# Patient Record
Sex: MALE | Race: BLACK OR AFRICAN AMERICAN
[De-identification: names, ages, dates, MRNs, and addresses within clinical notes are randomized per-mention and may not be internally consistent; named-entity substitution may affect disease eponyms.]

---

## 2017-09-27 ENCOUNTER — HOSPITAL ENCOUNTER (OUTPATIENT)
Dept: HOSPITAL 72 - PTY | Age: 67
LOS: 3 days | Discharge: HOME | End: 2017-09-30
Payer: MEDICARE

## 2017-09-27 DIAGNOSIS — M19.90: ICD-10-CM

## 2017-09-27 DIAGNOSIS — G89.29: ICD-10-CM

## 2017-09-27 DIAGNOSIS — E09.40: ICD-10-CM

## 2017-09-27 DIAGNOSIS — M51.36: Primary | ICD-10-CM

## 2017-09-27 DIAGNOSIS — R29.6: ICD-10-CM

## 2017-09-27 DIAGNOSIS — F32.9: ICD-10-CM

## 2017-09-27 PROCEDURE — 97162 PT EVAL MOD COMPLEX 30 MIN: CPT

## 2017-09-27 PROCEDURE — 97140 MANUAL THERAPY 1/> REGIONS: CPT

## 2017-09-27 PROCEDURE — 97110 THERAPEUTIC EXERCISES: CPT

## 2017-10-23 ENCOUNTER — HOSPITAL ENCOUNTER (OUTPATIENT)
Dept: HOSPITAL 72 - PTY | Age: 67
LOS: 8 days | Discharge: HOME | End: 2017-10-31
Payer: MEDICARE

## 2017-10-23 DIAGNOSIS — M51.36: Primary | ICD-10-CM

## 2017-10-23 DIAGNOSIS — I10: ICD-10-CM

## 2017-10-23 DIAGNOSIS — M19.90: ICD-10-CM

## 2017-10-23 DIAGNOSIS — F32.9: ICD-10-CM

## 2017-10-23 DIAGNOSIS — E09.40: ICD-10-CM

## 2017-10-23 PROCEDURE — 97110 THERAPEUTIC EXERCISES: CPT

## 2017-10-23 PROCEDURE — 97140 MANUAL THERAPY 1/> REGIONS: CPT

## 2018-01-13 ENCOUNTER — HOSPITAL ENCOUNTER (INPATIENT)
Dept: HOSPITAL 72 - EMR | Age: 68
LOS: 5 days | Discharge: HOME HEALTH | DRG: 720 | End: 2018-01-18
Payer: MEDICARE

## 2018-01-13 VITALS — HEIGHT: 74 IN | BODY MASS INDEX: 31.83 KG/M2 | WEIGHT: 248 LBS

## 2018-01-13 VITALS — SYSTOLIC BLOOD PRESSURE: 175 MMHG | DIASTOLIC BLOOD PRESSURE: 82 MMHG

## 2018-01-13 VITALS — DIASTOLIC BLOOD PRESSURE: 83 MMHG | SYSTOLIC BLOOD PRESSURE: 143 MMHG

## 2018-01-13 VITALS — DIASTOLIC BLOOD PRESSURE: 91 MMHG | SYSTOLIC BLOOD PRESSURE: 173 MMHG

## 2018-01-13 VITALS — SYSTOLIC BLOOD PRESSURE: 140 MMHG | DIASTOLIC BLOOD PRESSURE: 88 MMHG

## 2018-01-13 DIAGNOSIS — N39.0: ICD-10-CM

## 2018-01-13 DIAGNOSIS — Z87.891: ICD-10-CM

## 2018-01-13 DIAGNOSIS — I50.9: ICD-10-CM

## 2018-01-13 DIAGNOSIS — N17.9: ICD-10-CM

## 2018-01-13 DIAGNOSIS — I13.10: ICD-10-CM

## 2018-01-13 DIAGNOSIS — D35.2: ICD-10-CM

## 2018-01-13 DIAGNOSIS — N40.0: ICD-10-CM

## 2018-01-13 DIAGNOSIS — M51.17: ICD-10-CM

## 2018-01-13 DIAGNOSIS — D63.1: ICD-10-CM

## 2018-01-13 DIAGNOSIS — E88.09: ICD-10-CM

## 2018-01-13 DIAGNOSIS — E11.22: ICD-10-CM

## 2018-01-13 DIAGNOSIS — N18.5: ICD-10-CM

## 2018-01-13 DIAGNOSIS — A41.9: Primary | ICD-10-CM

## 2018-01-13 DIAGNOSIS — F20.0: ICD-10-CM

## 2018-01-13 LAB
ADD MANUAL DIFF: YES
ALBUMIN SERPL-MCNC: 2.8 G/DL (ref 3.4–5)
ALBUMIN/GLOB SERPL: 0.7 {RATIO} (ref 1–2.7)
ALP SERPL-CCNC: 69 U/L (ref 46–116)
ALT SERPL-CCNC: 26 U/L (ref 12–78)
ANION GAP SERPL CALC-SCNC: 16 MMOL/L (ref 5–15)
APPEARANCE UR: (no result)
APTT PPP: (no result) S
AST SERPL-CCNC: 13 U/L (ref 15–37)
BILIRUB SERPL-MCNC: 0.3 MG/DL (ref 0.2–1)
BUN SERPL-MCNC: 37 MG/DL (ref 7–18)
CALCIUM SERPL-MCNC: 8.6 MG/DL (ref 8.5–10.1)
CHLORIDE SERPL-SCNC: 104 MMOL/L (ref 98–107)
CO2 SERPL-SCNC: 16 MMOL/L (ref 21–32)
CREAT SERPL-MCNC: 5.1 MG/DL (ref 0.55–1.3)
ERYTHROCYTE [DISTWIDTH] IN BLOOD BY AUTOMATED COUNT: 14.7 % (ref 11.6–14.8)
GLOBULIN SER-MCNC: 4.3 G/DL
GLUCOSE UR STRIP-MCNC: NEGATIVE MG/DL
HCT VFR BLD CALC: 22.2 % (ref 42–52)
HGB BLD-MCNC: 7.3 G/DL (ref 14.2–18)
KETONES UR QL STRIP: NEGATIVE
LEUKOCYTE ESTERASE UR QL STRIP: (no result)
MCV RBC AUTO: 94 FL (ref 80–99)
NITRITE UR QL STRIP: NEGATIVE
PH UR STRIP: 5 [PH] (ref 4.5–8)
PLATELET # BLD: 279 K/UL (ref 150–450)
POTASSIUM SERPL-SCNC: 4.2 MMOL/L (ref 3.5–5.1)
PROT UR QL STRIP: (no result)
RBC # BLD AUTO: 2.37 M/UL (ref 4.7–6.1)
SODIUM SERPL-SCNC: 136 MMOL/L (ref 136–145)
SP GR UR STRIP: 1.01 (ref 1–1.03)
UROBILINOGEN UR-MCNC: NORMAL MG/DL (ref 0–1)
WBC # BLD AUTO: 21.9 K/UL (ref 4.8–10.8)

## 2018-01-13 PROCEDURE — 36415 COLL VENOUS BLD VENIPUNCTURE: CPT

## 2018-01-13 PROCEDURE — 82550 ASSAY OF CK (CPK): CPT

## 2018-01-13 PROCEDURE — 84550 ASSAY OF BLOOD/URIC ACID: CPT

## 2018-01-13 PROCEDURE — 87340 HEPATITIS B SURFACE AG IA: CPT

## 2018-01-13 PROCEDURE — 87086 URINE CULTURE/COLONY COUNT: CPT

## 2018-01-13 PROCEDURE — 71045 X-RAY EXAM CHEST 1 VIEW: CPT

## 2018-01-13 PROCEDURE — 85007 BL SMEAR W/DIFF WBC COUNT: CPT

## 2018-01-13 PROCEDURE — 85025 COMPLETE CBC W/AUTO DIFF WBC: CPT

## 2018-01-13 PROCEDURE — 86803 HEPATITIS C AB TEST: CPT

## 2018-01-13 PROCEDURE — 83550 IRON BINDING TEST: CPT

## 2018-01-13 PROCEDURE — 87517 HEPATITIS B DNA QUANT: CPT

## 2018-01-13 PROCEDURE — 99285 EMERGENCY DEPT VISIT HI MDM: CPT

## 2018-01-13 PROCEDURE — 83540 ASSAY OF IRON: CPT

## 2018-01-13 PROCEDURE — 83935 ASSAY OF URINE OSMOLALITY: CPT

## 2018-01-13 PROCEDURE — 86703 HIV-1/HIV-2 1 RESULT ANTBDY: CPT

## 2018-01-13 PROCEDURE — 82962 GLUCOSE BLOOD TEST: CPT

## 2018-01-13 PROCEDURE — 84443 ASSAY THYROID STIM HORMONE: CPT

## 2018-01-13 PROCEDURE — 80048 BASIC METABOLIC PNL TOTAL CA: CPT

## 2018-01-13 PROCEDURE — 76775 US EXAM ABDO BACK WALL LIM: CPT

## 2018-01-13 PROCEDURE — 84100 ASSAY OF PHOSPHORUS: CPT

## 2018-01-13 PROCEDURE — 84300 ASSAY OF URINE SODIUM: CPT

## 2018-01-13 PROCEDURE — 86901 BLOOD TYPING SEROLOGIC RH(D): CPT

## 2018-01-13 PROCEDURE — 81003 URINALYSIS AUTO W/O SCOPE: CPT

## 2018-01-13 PROCEDURE — 86850 RBC ANTIBODY SCREEN: CPT

## 2018-01-13 PROCEDURE — 87040 BLOOD CULTURE FOR BACTERIA: CPT

## 2018-01-13 PROCEDURE — 86900 BLOOD TYPING SEROLOGIC ABO: CPT

## 2018-01-13 PROCEDURE — 80053 COMPREHEN METABOLIC PANEL: CPT

## 2018-01-13 PROCEDURE — 82728 ASSAY OF FERRITIN: CPT

## 2018-01-13 PROCEDURE — 86920 COMPATIBILITY TEST SPIN: CPT

## 2018-01-13 PROCEDURE — 82570 ASSAY OF URINE CREATININE: CPT

## 2018-01-13 RX ADMIN — DEXTROSE MONOHYDRATE SCH MLS/HR: 50 INJECTION, SOLUTION INTRAVENOUS at 21:52

## 2018-01-13 NOTE — EMERGENCY ROOM REPORT
History of Present Illness


General


Chief Complaint:  Abnormal Labs


Source:  Patient





Present Illness


HPI


67-year-old male sent by primary care doctor for low hemoglobin


Patient states it was checked twice this week and was "low" both times


He denies chest pain, shortness of breath, weakness or palpitations


denies no history of iron deficiency anemia


He does endorse 2 days of dark stool but no chapito blood in stool


History of upper GI bleed or gastritis


Does not use excessive amounts of NSAIDs


Allergies:  


Coded Allergies:  


     No Known Allergies (Unverified , 9/25/17)





Patient History


Past Medical History:  DM


Past Surgical History:  none


Pertinent Family History:  none


Social History:  Denies: smoking, alcohol use, drug use


Immunizations:  UTD


Reviewed Nursing Documentation:  PMH: Agreed, PSxH: Agreed





Nursing Documentation-PMH


Past Medical History:  No History, Except For


Hx Hypertension:  Yes





Review of Systems


All Other Systems:  negative except mentioned in HPI





Physical Exam





Vital Signs








  Date Time  Temp Pulse Resp B/P (MAP) Pulse Ox O2 Delivery O2 Flow Rate FiO2


 


1/13/18 10:06 98.8 59 18 120/67 96 Room Air  








Sp02 EP Interpretation:  reviewed, normal


General Appearance:  normal inspection, well appearing, no apparent distress, 

alert, GCS 15, non-toxic


Head:  normocephalic, atraumatic


Eyes:  bilateral eye PERRL, bilateral eye EOMI


ENT:  normal ENT inspection, hearing grossly normal, normal pharynx, no 

angioedema, normal voice, TMs + canals normal, uvula midline, moist mucus 

membranes


Neck:  normal inspection, full range of motion, supple, thyroid normal, no 

meningismus, no bony tend


Respiratory:  normal inspection, lungs clear, normal breath sounds, no rhonchi, 

no respiratory distress, no retraction, no accessory muscle use, no wheezing, 

speaking full sentences


Cardiovascular #1:  regular rate, rhythm, no edema, no JVD, normal capillary 

refill


Gastrointestinal:  normal inspection, normal bowel sounds, non tender, soft, no 

mass, no peritonitis, non-distended, no guarding, no hernia, no pulsatile mass


Genitourinary:  no CVA tenderness


Musculoskeletal:  normal inspection, back normal, normal range of motion, no 

calf tenderness, pelvis stable, Lisset's Sign negative


Neurologic:  normal inspection, alert, oriented x3, responsive, CNs III-XII nml 

as tested, motor strength/tone normal, cerebellar normal, normal gait, speech 

normal


Psychiatric:  normal inspection, judgement/insight normal, mood/affect normal, 

no suicidal/homicidal ideation, no delusions


Skin:  normal inspection, no rash, pallor


Lymphatic:  normal inspection, no adenopathy





Medical Decision Making


Diagnostic Impression:  


 Primary Impression:  


 Leukocytosis


 Qualified Codes:  D72.829 - Elevated white blood cell count, unspecified


 Additional Impressions:  


 CKD (chronic kidney disease)


 Qualified Codes:  N18.9 - Chronic kidney disease, unspecified


 Anemia


 Qualified Codes:  D64.9 - Anemia, unspecified


ER Course


Patient found to have low hemoglobin - transfuse 2 units of blood in the ER


Patient also found to have elevated white count, with high neutrophil percentage

, however vital signs are stable, afebrile, no obvious source of infection on 

chest x-ray and abdomen is soft and nondistended.


Blood cultures are pending- will give him empiric Antibiotics


He states known kidney disease but not on dialysis.  Elevated creatinine here, 

no other level to compare to.





PMD is Dr. Munguia


med/surg bed


1pm


Rhythm Strip Diag. Results


EP Interpretation:  yes


Rate:  70


Rhythm:  NSR, no PVC's, no ectopy





Chest X-Ray Diagnostic Results


Chest X-Ray Diagnostic Results :  


   Chest X-Ray Ordered:  Yes


   # of Views/Limited/Complete:  1 View


   Indication:  Other - weakness


   EP Interpretation:  Yes


   Interpretation:  no consolidation, no effusion, no pneumothorax, no acute 

cardiopulmonary disease


   Impression:  No acute disease


   Electronically Signed by:  Dr Manuel Archuleta MD





Last Vital Signs








  Date Time  Temp Pulse Resp B/P (MAP) Pulse Ox O2 Delivery O2 Flow Rate FiO2


 


1/13/18 11:58  69 15 175/82 98 Room Air  


 


1/13/18 10:06 98.8       








Status:  improved


Disposition:  ADMITTED AS INPATIENT


Condition:  Serious


Scripts


Unable to Obtain Active Prescriptions or Reported Meds


Referrals:  


CHINEDU MICHELLE,REFERRING (PCP)











MANUEL ARCHULETA M.D. Jan 13, 2018 12:59

## 2018-01-13 NOTE — DIAGNOSTIC IMAGING REPORT
Indication: Weakness

 

Technique: XRAY Chest 1v

 

Comparison: None

 

Findings: Low lung volumes exaggerate heart size and vascular markings. Heart size

not reliably assessed. Possibly mildly enlarged. There is vascular crowding at the

bases. There is no definite focal consolidation. No pleural effusion or pneumothorax.

No acute osseous abnormality seen.

 

Impression: Limited exam with low lung volumes. Apparent mild cardiomegaly and

pulmonary vascular congestion, possibly artifactual. Repeat exam with improved

inspiratory effort may be obtained for better evaluation as clinically indicated. No

definite focal airspace consolidation.

## 2018-01-14 VITALS — DIASTOLIC BLOOD PRESSURE: 79 MMHG | SYSTOLIC BLOOD PRESSURE: 154 MMHG

## 2018-01-14 VITALS — SYSTOLIC BLOOD PRESSURE: 140 MMHG | DIASTOLIC BLOOD PRESSURE: 68 MMHG

## 2018-01-14 VITALS — SYSTOLIC BLOOD PRESSURE: 174 MMHG | DIASTOLIC BLOOD PRESSURE: 92 MMHG

## 2018-01-14 VITALS — SYSTOLIC BLOOD PRESSURE: 150 MMHG | DIASTOLIC BLOOD PRESSURE: 82 MMHG

## 2018-01-14 VITALS — DIASTOLIC BLOOD PRESSURE: 83 MMHG | SYSTOLIC BLOOD PRESSURE: 165 MMHG

## 2018-01-14 LAB
ADD MANUAL DIFF: YES
ADD MANUAL DIFF: YES
ALBUMIN SERPL-MCNC: 2.4 G/DL (ref 3.4–5)
ALBUMIN/GLOB SERPL: 0.6 {RATIO} (ref 1–2.7)
ALP SERPL-CCNC: 71 U/L (ref 46–116)
ALT SERPL-CCNC: 36 U/L (ref 12–78)
ANION GAP SERPL CALC-SCNC: 12 MMOL/L (ref 5–15)
AST SERPL-CCNC: 25 U/L (ref 15–37)
BILIRUB SERPL-MCNC: 0.3 MG/DL (ref 0.2–1)
BUN SERPL-MCNC: 42 MG/DL (ref 7–18)
CALCIUM SERPL-MCNC: 8.4 MG/DL (ref 8.5–10.1)
CHLORIDE SERPL-SCNC: 107 MMOL/L (ref 98–107)
CK SERPL-CCNC: 129 U/L (ref 26–308)
CO2 SERPL-SCNC: 18 MMOL/L (ref 21–32)
CREAT SERPL-MCNC: 4.6 MG/DL (ref 0.55–1.3)
ERYTHROCYTE [DISTWIDTH] IN BLOOD BY AUTOMATED COUNT: 14.5 % (ref 11.6–14.8)
ERYTHROCYTE [DISTWIDTH] IN BLOOD BY AUTOMATED COUNT: 14.7 % (ref 11.6–14.8)
GLOBULIN SER-MCNC: 4.1 G/DL
HCT VFR BLD CALC: 22.4 % (ref 42–52)
HCT VFR BLD CALC: 23 % (ref 42–52)
HGB BLD-MCNC: 7.4 G/DL (ref 14.2–18)
HGB BLD-MCNC: 7.7 G/DL (ref 14.2–18)
MCV RBC AUTO: 93 FL (ref 80–99)
MCV RBC AUTO: 93 FL (ref 80–99)
PLATELET # BLD: 244 K/UL (ref 150–450)
PLATELET # BLD: 257 K/UL (ref 150–450)
POTASSIUM SERPL-SCNC: 3.9 MMOL/L (ref 3.5–5.1)
RBC # BLD AUTO: 2.41 M/UL (ref 4.7–6.1)
RBC # BLD AUTO: 2.48 M/UL (ref 4.7–6.1)
SODIUM SERPL-SCNC: 137 MMOL/L (ref 136–145)
WBC # BLD AUTO: 16.7 K/UL (ref 4.8–10.8)
WBC # BLD AUTO: 18.4 K/UL (ref 4.8–10.8)

## 2018-01-14 PROCEDURE — 30233N1 TRANSFUSION OF NONAUTOLOGOUS RED BLOOD CELLS INTO PERIPHERAL VEIN, PERCUTANEOUS APPROACH: ICD-10-PCS

## 2018-01-14 RX ADMIN — INSULIN ASPART SCH UNITS: 100 INJECTION, SOLUTION INTRAVENOUS; SUBCUTANEOUS at 16:40

## 2018-01-14 RX ADMIN — LEVOTHYROXINE SODIUM SCH MCG: 25 TABLET ORAL at 06:18

## 2018-01-14 RX ADMIN — INSULIN ASPART SCH UNITS: 100 INJECTION, SOLUTION INTRAVENOUS; SUBCUTANEOUS at 06:30

## 2018-01-14 RX ADMIN — INSULIN ASPART SCH UNITS: 100 INJECTION, SOLUTION INTRAVENOUS; SUBCUTANEOUS at 06:19

## 2018-01-14 RX ADMIN — METOPROLOL TARTRATE SCH MG: 25 TABLET, FILM COATED ORAL at 09:04

## 2018-01-14 RX ADMIN — SODIUM CHLORIDE SCH MLS/HR: 0.9 INJECTION INTRAVENOUS at 14:58

## 2018-01-14 RX ADMIN — HYDRALAZINE HYDROCHLORIDE PRN MG: 10 TABLET ORAL at 13:00

## 2018-01-14 RX ADMIN — INSULIN ASPART SCH UNITS: 100 INJECTION, SOLUTION INTRAVENOUS; SUBCUTANEOUS at 11:51

## 2018-01-14 RX ADMIN — DEXTROSE MONOHYDRATE SCH MLS/HR: 50 INJECTION, SOLUTION INTRAVENOUS at 09:05

## 2018-01-14 RX ADMIN — INSULIN ASPART SCH UNITS: 100 INJECTION, SOLUTION INTRAVENOUS; SUBCUTANEOUS at 06:29

## 2018-01-14 RX ADMIN — SODIUM CITRATE AND CITRIC ACID MONOHYDRATE SCH ML: 500; 334 SOLUTION ORAL at 21:59

## 2018-01-14 RX ADMIN — SODIUM CHLORIDE SCH MLS/HR: 0.9 INJECTION INTRAVENOUS at 21:00

## 2018-01-14 RX ADMIN — INSULIN ASPART SCH UNITS: 100 INJECTION, SOLUTION INTRAVENOUS; SUBCUTANEOUS at 21:07

## 2018-01-14 RX ADMIN — METOPROLOL TARTRATE SCH MG: 25 TABLET, FILM COATED ORAL at 21:05

## 2018-01-14 NOTE — HISTORY AND PHYSICAL REPORT
DATE OF ADMISSION:  01/13/2018



REASON FOR ADMISSION:  Acute on chronic renal failure with severe anemia.



HISTORY OF PRESENT ILLNESS:  This is a 67-year-old  male

with a longstanding history of hypertensive heart disease and type 2

diabetes mellitus.  He has been managed medically for almost 20 years at

my office and has done well up until the last several months.



Late last year, he was noted to have abnormal laboratory studies

associated with pituitary microadenoma.  He is seeing  _____ as an

outpatient and is being considered for _____ therapy and the repeat MRI

has been pending.



Over the past few months, he has had progressive rise in his renal

function despite better blood pressure and glucose control.  He has also

been on steroids intermittently because of lumbosacral disc disease.  Last

two weeks ago, he was noted to have a very low hemoglobin level with no

signs of bleeding.  Repeat studies have confirmed that and he was referred

to the hospital for further care.



PAST MEDICAL HISTORY:  Hypertension with hypertensive heart disease, sinus

node disease with asymptomatic bradycardia on low-dose beta-blockers,

noninsulin requiring diabetes mellitus, chronic kidney disease, pituitary

microadenoma, degenerative disk disease, lumbosacral radiculopathy,

hyperuricemia, schizoaffective disorder, hypothyroidism,

hypertriglyceridemia, and BPH.



MEDICATIONS:  Prior to admission, reviewed and reconciled.



ALLERGIES:  None.



SOCIAL HISTORY:  Negative for smoking, alcohol, or substance abuse.



FAMILY HISTORY:  Notable for diabetes and hypertension in his

mother.



REVIEW OF SYSTEMS:  No fevers or chills.  No cough or sputum production.

No dysuria or frequency.  No change in bowel habits.  He recently had

steroid injection in his back.  As noted he is being evaluated for

pituitary microadenoma, which is prolactin secreting. There is no history

of seizure or stroke.  He does have a history of hypertriglyceridemia.



PHYSICAL EXAMINATION:

GENERAL:  Appears well, in no acute distress.

VITAL SIGNS:  Blood pressure on admission 173/91 now with 200/85, heart

rate 84, respiratory 20, and afebrile.

HEENT:  Conjunctivae pallor.  Sclerae anicteric.  Oropharynx clear.

NECK:  Supple.  Jugular venous pressure normal.

LUNGS:  Clear.

CARDIAC:  Regular rhythm and rate.  Normal S1 and S2 with a fourth heart

sound.

ABDOMEN:  Soft and nontender.  No CVA tenderness.

EXTREMITIES:  Good pulses.  No edema.  The patient is unable to stand

completely straight due to low back pain.



LABORATORY AND DIAGNOSTIC DATA:  White count 21.9, hemoglobin 7.3, and MCV

94.  Sodium 136, potassium 4.2, bicarbonate 16, BUN 37, and creatinine

5.1.  Albumin 2.8.  Urinalysis with too numerous to count white cells.

Urine osmolality is 312 and sodium of 35.



IMPRESSION:

1. Urinary tract infection.

2. Acute on chronic renal failure.

3. Anemia due to chronic kidney disease.

4. Hypertensive cardiomyopathy.

5. Type 2 diabetes mellitus.

6. Pituitary microadenoma.

7. Lumbosacral radiculopathy with degenerative disk disease.

8. Prostatic hypertrophy.

9. Hypoalbuminemia.



PLAN:  Antimicrobials, hydration, renal ultrasound, renal consult, and

titrate antihypertensives.  No resumption of losartan at this time.  Avoid

steroids at this time.  Consider one unit of packed red blood cell

transfusion.









  ______________________________________________

  Edmar Munguia M.D.





DR:  ALEXSANDRA

D:  01/13/2018 23:42

T:  01/14/2018 04:56

JOB#:  [id number]

CC:

## 2018-01-14 NOTE — CONSULTATION
DATE OF CONSULTATION:  01/14/2018



CONSULTING PHYSICIAN:  Maurice Wall M.D.



REFERRING PHYSICIAN:  Edmar Munguia M.D.



REASON FOR CONSULTATION:  Chronic kidney disease.



HISTORY OF PRESENT ILLNESS:  The patient is a 67-year-old man who has had

diabetes and hypertension for many years and chronic kidney disease.  He

presents with worsening anemia and renal failure.  There is no bleeding.

The patient had been using ibuprofen on and off intermittently for chronic

back pain.  This was discontinued a few months ago.  He is on no

nonsteroidal anti-inflammatory agents this time or received no contrast.

The patient has had some generalized weakness.  He said he had a flu-like

illness around Christmas of 2017.  The patient states sugars have been

ranging in the 130s and blood pressure from 135-160 systolic.



PAST MEDICAL HISTORY:  Includes pituitary microadenoma and lumbar disc

disease.  He has been on steroids intermittently for the lumbar disk

disease.  He has also had sinus node disease with asymptomatic per

bradycardia, on low-dose beta-blockers.  There is a history of

schizoaffective disorder, hypertriglyceridemia, and BPH.



PAST SURGICAL HISTORY:  Cataract interocular lens and ear surgery for

hearing impairment.



MEDICATIONS:  At home include amlodipine, _____01:20, glimepiride,

levothyroxine, losartan, metoprolol, and Zantac.



ALLERGIES:  None known.



HABITS:  He was a smoker in the distant past and quit.  Alcohol rare.

Drugs none.



SOCIAL HISTORY:  The patient is retired and single.  He has been a

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

other jobs.



REVIEW OF SYSTEMS:  HEAD, EYES, EARS, NOSE, AND THROAT:  History of

cataracts.  No known diabetic retinopathy.  History of ear surgery for

hearing impairment.  PULMONARY:  No chronic cough, asthma, or TB.

CARDIAC:  History of bradycardia as above.  No CHF or angina.  ENDOCRINE:

History of apparently prolactin secreting pituitary adenoma,

hypothyroidism, and diabetes.  GASTROINTESTINAL:  No recent nausea,

vomiting, hematochezia, or melena.  GENITOURINARY:  He states he voids

well.  Nocturia about two times per night.  No decrease in stream of

urine.  NEUROLOGIC:  No seizures or stroke.



PHYSICAL EXAMINATION:

GENERAL:  The patient is an alert man, lying in bed, in no acute

distress.

VITAL SIGNS:  Temperature 98.8 degrees, pulse 71, respirations 18, and

blood pressure 165/83, O2 saturation 97% on room air.

HEAD, EYES, EARS, NOSE, AND THROAT:  Sclerae are nonicteric.  Ocular

motions intact in all directions.  Oral mucosa moist.

NECK:  No adenopathy or thyroid enlargement.

LUNGS:  Clear.

HEART:  Regular rhythm with ectopic beats.  An apical S4.  I do not hear

any murmurs.

ABDOMEN:  Soft without organomegaly or masses.

EXTREMITIES:  No edema.

NEUROLOGIC:  He is alert and responsive.  He is oriented.  There is a mild

dysarthria.  Ocular motions intact in all directions.  Smile is symmetric.

Tongue is midline.  He moves all extremities equally.



LABORATORY DATA:  Labs are as follows, white count 21.9, repeat 18.4;

hemoglobin 7.3, repeat 7.7 posttransfusion.  Sodium 136, potassium 4.2,

chloride 104, CO2 16, BUN 37 and creatinine 5.1, repeat 42 and 4.6.  Uric

acid 5.4.  Albumin 2.4.  TSH 3.085.  Urine shows too numerous to count

white blood cells.  Urine sodium 35, urine creatinine is 98.5.



IMPRESSION:

1. Chronic kidney disease, stage 5.

2. Urinary tract infection.

3. Mild dehydration.

4. History of diabetes.

5. History of hypertension.

6. Anemia of chronic kidney disease.

7. Lumbar disk disease.



PLAN:  The patient will be hydrated cautiously.  I discussed with the

patient that he will likely need dialysis in the near future and we should

protect his left arm for dialysis fistula, which will be created in the

near future.  I will talk further with Dr. Munguia and review his prior

laboratories and course and we will make plans as above.  He also needs to

be on Epogen or equivalent for anemia of chronic kidney disease.  Orders

have been given.









  ______________________________________________

  Maurice Wall M.D. DR:  Nena

D:  01/14/2018 13:06

T:  01/14/2018 19:01

JOB#:  [id number]

CC:

## 2018-01-15 VITALS — SYSTOLIC BLOOD PRESSURE: 163 MMHG | DIASTOLIC BLOOD PRESSURE: 91 MMHG

## 2018-01-15 VITALS — SYSTOLIC BLOOD PRESSURE: 169 MMHG | DIASTOLIC BLOOD PRESSURE: 97 MMHG

## 2018-01-15 VITALS — SYSTOLIC BLOOD PRESSURE: 165 MMHG | DIASTOLIC BLOOD PRESSURE: 92 MMHG

## 2018-01-15 VITALS — DIASTOLIC BLOOD PRESSURE: 97 MMHG | SYSTOLIC BLOOD PRESSURE: 169 MMHG

## 2018-01-15 VITALS — SYSTOLIC BLOOD PRESSURE: 144 MMHG | DIASTOLIC BLOOD PRESSURE: 70 MMHG

## 2018-01-15 VITALS — DIASTOLIC BLOOD PRESSURE: 65 MMHG | SYSTOLIC BLOOD PRESSURE: 146 MMHG

## 2018-01-15 VITALS — SYSTOLIC BLOOD PRESSURE: 175 MMHG | DIASTOLIC BLOOD PRESSURE: 86 MMHG

## 2018-01-15 VITALS — DIASTOLIC BLOOD PRESSURE: 94 MMHG | SYSTOLIC BLOOD PRESSURE: 185 MMHG

## 2018-01-15 LAB
% IRON SATURATION: 22 % (ref 15–50)
ADD MANUAL DIFF: NO
ANION GAP SERPL CALC-SCNC: 14 MMOL/L (ref 5–15)
BASOPHILS NFR BLD AUTO: 0.4 % (ref 0–2)
BUN SERPL-MCNC: 43 MG/DL (ref 7–18)
CALCIUM SERPL-MCNC: 8.4 MG/DL (ref 8.5–10.1)
CHLORIDE SERPL-SCNC: 107 MMOL/L (ref 98–107)
CK SERPL-CCNC: 134 U/L (ref 26–308)
CO2 SERPL-SCNC: 19 MMOL/L (ref 21–32)
CREAT SERPL-MCNC: 4.6 MG/DL (ref 0.55–1.3)
EOSINOPHIL NFR BLD AUTO: 0.3 % (ref 0–3)
ERYTHROCYTE [DISTWIDTH] IN BLOOD BY AUTOMATED COUNT: 14.4 % (ref 11.6–14.8)
FERRITIN SERPL-MCNC: 416 NG/ML (ref 8–388)
HCT VFR BLD CALC: 25.3 % (ref 42–52)
HGB BLD-MCNC: 8.6 G/DL (ref 14.2–18)
IRON SERPL-MCNC: 38 UG/DL (ref 50–175)
LYMPHOCYTES NFR BLD AUTO: 10.8 % (ref 20–45)
MCV RBC AUTO: 91 FL (ref 80–99)
MONOCYTES NFR BLD AUTO: 9.4 % (ref 1–10)
NEUTROPHILS NFR BLD AUTO: 79.2 % (ref 45–75)
PHOSPHATE SERPL-MCNC: 3.6 MG/DL (ref 2.5–4.9)
PLATELET # BLD: 299 K/UL (ref 150–450)
POTASSIUM SERPL-SCNC: 3.8 MMOL/L (ref 3.5–5.1)
RBC # BLD AUTO: 2.76 M/UL (ref 4.7–6.1)
SODIUM SERPL-SCNC: 140 MMOL/L (ref 136–145)
TIBC SERPL-MCNC: 173 UG/DL (ref 250–450)
UNSATURATED IRON BINDING: 135 UG/DL (ref 112–346)
WBC # BLD AUTO: 12.6 K/UL (ref 4.8–10.8)

## 2018-01-15 RX ADMIN — SODIUM CHLORIDE SCH MLS/HR: 0.9 INJECTION INTRAVENOUS at 09:12

## 2018-01-15 RX ADMIN — SODIUM CHLORIDE SCH MLS/HR: 0.9 INJECTION INTRAVENOUS at 16:52

## 2018-01-15 RX ADMIN — HYDRALAZINE HYDROCHLORIDE PRN MG: 10 TABLET ORAL at 00:51

## 2018-01-15 RX ADMIN — INSULIN ASPART SCH UNITS: 100 INJECTION, SOLUTION INTRAVENOUS; SUBCUTANEOUS at 12:19

## 2018-01-15 RX ADMIN — SODIUM CITRATE AND CITRIC ACID MONOHYDRATE SCH ML: 500; 334 SOLUTION ORAL at 04:05

## 2018-01-15 RX ADMIN — INSULIN ASPART SCH UNITS: 100 INJECTION, SOLUTION INTRAVENOUS; SUBCUTANEOUS at 22:32

## 2018-01-15 RX ADMIN — INSULIN ASPART SCH UNITS: 100 INJECTION, SOLUTION INTRAVENOUS; SUBCUTANEOUS at 17:01

## 2018-01-15 RX ADMIN — ERYTHROPOIETIN SCH UNITS: 20000 INJECTION, SOLUTION INTRAVENOUS; SUBCUTANEOUS at 22:27

## 2018-01-15 RX ADMIN — HYDRALAZINE HYDROCHLORIDE PRN MG: 10 TABLET ORAL at 11:28

## 2018-01-15 RX ADMIN — LEVOTHYROXINE SODIUM SCH MCG: 25 TABLET ORAL at 06:40

## 2018-01-15 RX ADMIN — METOPROLOL TARTRATE SCH MG: 25 TABLET, FILM COATED ORAL at 09:10

## 2018-01-15 RX ADMIN — METOPROLOL TARTRATE SCH MG: 25 TABLET, FILM COATED ORAL at 22:26

## 2018-01-15 RX ADMIN — SODIUM CHLORIDE SCH MLS/HR: 0.9 INJECTION INTRAVENOUS at 01:48

## 2018-01-15 RX ADMIN — INSULIN ASPART SCH UNITS: 100 INJECTION, SOLUTION INTRAVENOUS; SUBCUTANEOUS at 06:43

## 2018-01-15 RX ADMIN — BENZTROPINE MESYLATE SCH MG: 1 TABLET ORAL at 15:57

## 2018-01-15 RX ADMIN — BENZTROPINE MESYLATE SCH MG: 1 TABLET ORAL at 22:26

## 2018-01-15 RX ADMIN — SODIUM CHLORIDE SCH MLS/HR: 0.9 INJECTION INTRAVENOUS at 23:45

## 2018-01-15 RX ADMIN — SODIUM CHLORIDE SCH MLS/HR: 0.9 INJECTION INTRAVENOUS at 22:26

## 2018-01-15 RX ADMIN — HYDRALAZINE HYDROCHLORIDE PRN MG: 10 TABLET ORAL at 18:03

## 2018-01-15 NOTE — PROGRESS NOTE
DATE:  01/14/2018



CARDIOLOGY PROGRESS NOTE



SUBJECTIVE:  The patient is status post 1 unit of packed red blood cells.

Hemoglobin 7.7.  No chest pain or shortness of breath.  Renal consult

appreciated.



OBJECTIVE:

VITAL SIGNS:  Blood pressure 140/68, pulse 72, respirations 18, afebrile,

earlier 174/92.

NECK:  Supple.

LUNGS:  Clear.

CARDIAC:  Regular, normal S1, S2.

ABDOMEN:  Soft, 

EXTR: no edema.



PVR is 165 cc.



IMPRESSION:

1. Acute on chronic renal failure.

2. Diabetic nephropathy.

3. Hypertensive nephrosclerosis.

4. Hypertensive heart disease.

5. History of malignant hypertension.

6. Anemia due to chronic kidney disease.

7. Urinary tract infection with sepsis.

8. Leukocytosis.

9. BPH



PLAN:

1. Transfuse another unit.

2. Titrate antihypertensives; add doxazosin.

3. Continue antimicrobials.

4. Follow up white blood count.

5. Check renal ultrasound.









  ______________________________________________

  Edmar Munguia M.D.





DR:  JUAN

D:  01/15/2018 03:31

T:  01/15/2018 13:03

JOB#:  [id number]

CC:



CHHAYA

## 2018-01-15 NOTE — NEPHROLOGY PROGRESS NOTE
Assessment/Plan


Problem List:  


(1) Malignant hypertension (arteriolar nephrosclerosis)


(2) Diabetes


(3) UTI (urinary tract infection)


(4) CKD (chronic kidney disease) stage 5, GFR less than 15 ml/min


(5) Anemia in chronic kidney disease


(6) Leukocytosis


Plan


continue epogen, venofer, bp meds  needs av fistula soon





Subjective


Constitutional:  Reports: weakness


HEENT:  Reports: no symptoms


Genitourinary:  Reports: no symptoms


Neurologic/Psychiatric:  Reports: no symptoms





Objective


Objective





Last 24 Hour Vital Signs








  Date Time  Temp Pulse Resp B/P (MAP) Pulse Ox O2 Delivery O2 Flow Rate FiO2


 


1/15/18 13:04  58      





  71      





  78      


 


1/15/18 12:00  62 20 146/65 97 Room Air  


 


1/15/18 11:28    176/91    


 


1/15/18 09:12  67  175/86    


 


1/15/18 09:10  67  175/86    


 


1/15/18 08:40  67 16 175/86 98 Room Air  


 


1/15/18 04:00 99.3 76 18 163/91 97 Room Air  


 


1/15/18 00:51    165/92    


 


1/15/18 00:35 98.2 64 18 165/92 98 Room Air  


 


1/14/18 21:33  72      





  90      





  94      


 


1/14/18 21:05  72  140/68    


 


1/14/18 20:00 97.9 72 18 140/68 96 Room Air  


 


1/14/18 16:49  65  174/92    


 


1/14/18 16:03 98.2 65 20 174/92 96 Room Air  

















Intake and Output  


 


 1/14/18 1/15/18





 19:00 07:00


 


Intake Total 845 ml 180 ml


 


Balance 845 ml 180 ml


 


  


 


Intake Oral 720 ml 


 


IV Total 125 ml 180 ml


 


# Voids 2 


 


# Bowel Movements 1 








Laboratory Tests


1/14/18 16:15: 


White Blood Count 16.7H, Red Blood Count 2.48L, Hemoglobin 7.4L, Hematocrit 

23.0L, Mean Corpuscular Volume 93, Mean Corpuscular Hemoglobin 29.8, Mean 

Corpuscular Hemoglobin Concent 32.1, Red Cell Distribution Width 14.7, Platelet 

Count 257, Mean Platelet Volume 7.1, Neutrophils (%) (Auto) , Lymphocytes (%) (

Auto) , Monocytes (%) (Auto) , Eosinophils (%) (Auto) , Basophils (%) (Auto) , 

Differential Total Cells Counted 100, Neutrophils % (Manual) 85H, Lymphocytes % 

(Manual) 9L, Monocytes % (Manual) 6, Eosinophils % (Manual) 0, Basophils % (

Manual) 0, Band Neutrophils 0, Platelet Estimate Adequate, Platelet Morphology 

Normal, Hypochromasia 1+, Anisocytosis 1+


1/15/18 05:50: 


White Blood Count 12.6H, Red Blood Count 2.76L, Hemoglobin 8.6L, Hematocrit 

25.3L, Mean Corpuscular Volume 91, Mean Corpuscular Hemoglobin 31.0, Mean 

Corpuscular Hemoglobin Concent 33.9, Red Cell Distribution Width 14.4, Platelet 

Count 299, Mean Platelet Volume 7.0, Neutrophils (%) (Auto) 79.2H, Lymphocytes (

%) (Auto) 10.8L, Monocytes (%) (Auto) 9.4, Eosinophils (%) (Auto) 0.3, 

Basophils (%) (Auto) 0.4, Sodium Level 140, Potassium Level 3.8, Chloride Level 

107, Carbon Dioxide Level 19L, Anion Gap 14, Blood Urea Nitrogen 43H, 

Creatinine 4.6H, Estimat Glomerular Filtration Rate 15.4, Glucose Level 140H, 

Calcium Level 8.4L, Phosphorus Level 3.6, Iron Level 38L, Total Iron Binding 

Capacity 173L, Percent Iron Saturation 22, Unsaturated Iron Binding 135, 

Ferritin 416H, Total Creatine Kinase 134, Hepatitis B Surface Antigen [Pending]

, Hepatitis B Surface Antibody [Pending], Hepatitis C Antibody [Pending], HIV (1

&2) Antibody Rapid Negative


Height (Feet):  6


Height (Inches):  2.00


Weight (Pounds):  248


General Appearance:  no apparent distress, alert


EENT:  normal ENT inspection


Neck:  normal alignment


Cardiovascular:  normal rate, regular rhythm


Respiratory/Chest:  lungs clear


Abdomen:  non tender


Extremities:  other - no edema


Neurologic:  CNs II-XII grossly normal











MACKENZIE MAY Shakeel 15, 2018 15:31

## 2018-01-15 NOTE — DIAGNOSTIC IMAGING REPORT
Indication: Abnormal renal function

 

Technique: Multiplanar grayscale and color Doppler imaging of the kidneys and

bladder.

 

Comparison: None

 

Findings: The right kidney measures 12.5 cm in length. The left kidney measures 11.5

cm in length. There is subtle increased echogenicity bilaterally. These findings may

suggest medical renal disease. There is no hydronephrosis bilaterally. There is

question of trace fluid around the right kidney. A 4 cm simple appearing renal cyst

is noted in the left kidney. The bladder is unremarkable in appearance.

 

Impression: 

Subtle increased renal parenchymal echogenicity suggestive of medical renal disease.

 

No hydronephrosis or sonographically appreciable renal stones.

 

4 centimeter simple appearing cyst of the left kidney.

## 2018-01-16 VITALS — DIASTOLIC BLOOD PRESSURE: 91 MMHG | SYSTOLIC BLOOD PRESSURE: 180 MMHG

## 2018-01-16 VITALS — DIASTOLIC BLOOD PRESSURE: 93 MMHG | SYSTOLIC BLOOD PRESSURE: 186 MMHG

## 2018-01-16 VITALS — SYSTOLIC BLOOD PRESSURE: 171 MMHG | DIASTOLIC BLOOD PRESSURE: 98 MMHG

## 2018-01-16 VITALS — SYSTOLIC BLOOD PRESSURE: 191 MMHG | DIASTOLIC BLOOD PRESSURE: 86 MMHG

## 2018-01-16 VITALS — DIASTOLIC BLOOD PRESSURE: 87 MMHG | SYSTOLIC BLOOD PRESSURE: 176 MMHG

## 2018-01-16 VITALS — SYSTOLIC BLOOD PRESSURE: 173 MMHG | DIASTOLIC BLOOD PRESSURE: 97 MMHG

## 2018-01-16 LAB
APPEARANCE UR: (no result)
APTT PPP: (no result) S
GLUCOSE UR STRIP-MCNC: (no result) MG/DL
KETONES UR QL STRIP: NEGATIVE
LEUKOCYTE ESTERASE UR QL STRIP: (no result)
NITRITE UR QL STRIP: NEGATIVE
PH UR STRIP: 6 [PH] (ref 4.5–8)
PROT UR QL STRIP: (no result)
SP GR UR STRIP: 1.01 (ref 1–1.03)
UROBILINOGEN UR-MCNC: NORMAL MG/DL (ref 0–1)

## 2018-01-16 RX ADMIN — INSULIN ASPART SCH UNITS: 100 INJECTION, SOLUTION INTRAVENOUS; SUBCUTANEOUS at 12:14

## 2018-01-16 RX ADMIN — BENZTROPINE MESYLATE SCH MG: 1 TABLET ORAL at 20:39

## 2018-01-16 RX ADMIN — INSULIN ASPART SCH UNITS: 100 INJECTION, SOLUTION INTRAVENOUS; SUBCUTANEOUS at 20:40

## 2018-01-16 RX ADMIN — INSULIN ASPART SCH UNITS: 100 INJECTION, SOLUTION INTRAVENOUS; SUBCUTANEOUS at 17:08

## 2018-01-16 RX ADMIN — INSULIN ASPART SCH UNITS: 100 INJECTION, SOLUTION INTRAVENOUS; SUBCUTANEOUS at 06:39

## 2018-01-16 RX ADMIN — BENZTROPINE MESYLATE SCH MG: 1 TABLET ORAL at 08:58

## 2018-01-16 RX ADMIN — METOPROLOL TARTRATE SCH MG: 25 TABLET, FILM COATED ORAL at 20:38

## 2018-01-16 RX ADMIN — HYDRALAZINE HYDROCHLORIDE PRN MG: 10 TABLET ORAL at 04:53

## 2018-01-16 RX ADMIN — SODIUM CHLORIDE SCH MLS/HR: 0.9 INJECTION INTRAVENOUS at 08:58

## 2018-01-16 RX ADMIN — LEVOTHYROXINE SODIUM SCH MCG: 25 TABLET ORAL at 06:37

## 2018-01-16 RX ADMIN — DOXAZOSIN MESYLATE SCH MG: 4 TABLET ORAL at 16:58

## 2018-01-16 RX ADMIN — METOPROLOL TARTRATE SCH MG: 25 TABLET, FILM COATED ORAL at 08:59

## 2018-01-16 RX ADMIN — DOXAZOSIN MESYLATE SCH MG: 4 TABLET ORAL at 15:29

## 2018-01-16 RX ADMIN — SODIUM CHLORIDE SCH MLS/HR: 0.9 INJECTION INTRAVENOUS at 20:38

## 2018-01-16 RX ADMIN — HYDRALAZINE HYDROCHLORIDE PRN MG: 10 TABLET ORAL at 12:14

## 2018-01-16 NOTE — PROGRESS NOTE
DATE:  01/15/2018



CARDIOLOGY PROGRESS NOTE



SUBJECTIVE:  The patient has some anxiety.  He notes not having taken his

psych medications since admission.



OBJECTIVE:

VITAL SIGNS:  Afebrile.  Blood pressure 144/70, earlier 169/97, heart rate

71, and respiratory rate 18.

HEENT:  Conjunctiva pallor.  Oropharynx clear.

NECK:  Supple.

LUNGS:  Clear.

CARDIAC:  Regular rhythm and rate.  Normal S1, S2 with a fourth heart

sound.

ABDOMEN:  Soft.

EXTREMITIES:  No edema.

NEUROLOGIC:  Slight tremor.



LABORATORY DATA:  White count 12.6 and hemoglobin 8.6.  Potassium 3.8, BUN

43, and creatinine 4.6.



IMPRESSION:

1. Urinary tract infection, sepsis, and prostatic hypertrophy.

2. Hypertensive heart disease.

3. Malignant range of blood pressure.

4. Acute on chronic renal failure.

5. Anemia due to chronic kidney disease.

6. Schizoaffective disorder.

7. Pituitary adenoma secreting prolactin.



PLAN:

1. Restart Prolixin and Cogentin.

2. Continue antimicrobials.

3. Monitor hemoglobin.

4. Epogen and iron replacement.

5. Outpatient plans for a fistula to follow.

6. Titrate diabetic regimen.

7. Outpatient endocrine follow up.









  ______________________________________________

  Edmar Munguia M.D.





DR:  KALLIE

D:  01/16/2018 00:38

T:  01/16/2018 03:17

JOB#:  [id number]

CC:

## 2018-01-16 NOTE — NEPHROLOGY PROGRESS NOTE
Assessment/Plan


Problem List:  


(1) Malignant hypertension (arteriolar nephrosclerosis)


(2) Diabetes


(3) UTI (urinary tract infection)


(4) CKD (chronic kidney disease) stage 5, GFR less than 15 ml/min


(5) Anemia in chronic kidney disease


(6) Leukocytosis


Plan


continue epogen, venofer, bp meds titrate  needs av fistula soon





Subjective


Constitutional:  Reports: weakness


HEENT:  Reports: no symptoms


Genitourinary:  Reports: no symptoms


Neurologic/Psychiatric:  Reports: no symptoms





Objective


Objective





Last 24 Hour Vital Signs








  Date Time  Temp Pulse Resp B/P (MAP) Pulse Ox O2 Delivery O2 Flow Rate FiO2


 


1/16/18 12:14    191/86    


 


1/16/18 11:23 97.8 61 20 191/86 98   


 


1/16/18 09:00  64      





  63      





  62      


 


1/16/18 08:59  64  176/87    


 


1/16/18 08:59  64  176/87    


 


1/16/18 08:15 98.4  18 176/87 98 Room Air  


 


1/16/18 06:43  62  171/98    


 


1/16/18 04:53    186/93    


 


1/16/18 04:46 98.1 60 20 186/93 98 Room Air  


 


1/16/18 00:00      Room Air  


 


1/15/18 23:42 98.1 73 20 185/94 96 Room Air  


 


1/15/18 22:26  75  144/70    


 


1/15/18 20:00 97.9 75 18 144/70 96 Room Air  


 


1/15/18 19:48  61      





  65      





  69      


 


1/15/18 18:03    169/97    


 


1/15/18 18:03  71  169/97    


 


1/15/18 16:30   18 169/97 96 Room Air  

















Intake and Output  


 


 1/15/18 1/16/18





 19:00 07:00


 


Intake Total 720 ml 115 ml


 


Balance 720 ml 115 ml


 


  


 


Intake Oral 720 ml 


 


IV Total  115 ml


 


# Voids 4 3


 


# Bowel Movements 2 1








Laboratory Tests


1/16/18 08:25: 


Urine Color Pale yellow, Urine Appearance Slightly cloudy, Urine pH 6, Urine 

Specific Gravity 1.015, Urine Protein 4+H, Urine Glucose (UA) 1+H, Urine 

Ketones Negative, Urine Occult Blood 2+H, Urine Nitrite Negative, Urine 

Bilirubin Negative, Urine Urobilinogen Normal, Urine Leukocyte Esterase 1+H, 

Urine RBC 5-10H, Urine WBC 10-15H, Urine Squamous Epithelial Cells Few, Urine 

Bacteria Few, Urine Hyaline Casts 0-2H


Height (Feet):  6


Height (Inches):  2.00


Weight (Pounds):  248


General Appearance:  no apparent distress, alert, obese


EENT:  normal ENT inspection


Neck:  normal alignment


Cardiovascular:  normal rate


Respiratory/Chest:  lungs clear


Abdomen:  non tender


Extremities:  other - no edema


Neurologic:  CNs II-XII grossly normal











MACKENZIE MAY Jan 16, 2018 13:23

## 2018-01-17 VITALS — DIASTOLIC BLOOD PRESSURE: 76 MMHG | SYSTOLIC BLOOD PRESSURE: 179 MMHG

## 2018-01-17 VITALS — SYSTOLIC BLOOD PRESSURE: 192 MMHG | DIASTOLIC BLOOD PRESSURE: 85 MMHG

## 2018-01-17 VITALS — SYSTOLIC BLOOD PRESSURE: 147 MMHG | DIASTOLIC BLOOD PRESSURE: 71 MMHG

## 2018-01-17 VITALS — SYSTOLIC BLOOD PRESSURE: 155 MMHG | DIASTOLIC BLOOD PRESSURE: 86 MMHG

## 2018-01-17 VITALS — SYSTOLIC BLOOD PRESSURE: 152 MMHG | DIASTOLIC BLOOD PRESSURE: 92 MMHG

## 2018-01-17 VITALS — SYSTOLIC BLOOD PRESSURE: 182 MMHG | DIASTOLIC BLOOD PRESSURE: 97 MMHG

## 2018-01-17 VITALS — DIASTOLIC BLOOD PRESSURE: 89 MMHG | SYSTOLIC BLOOD PRESSURE: 158 MMHG

## 2018-01-17 LAB
ADD MANUAL DIFF: NO
ANION GAP SERPL CALC-SCNC: 14 MMOL/L (ref 5–15)
BASOPHILS NFR BLD AUTO: 1.2 % (ref 0–2)
BUN SERPL-MCNC: 36 MG/DL (ref 7–18)
CALCIUM SERPL-MCNC: 8.9 MG/DL (ref 8.5–10.1)
CHLORIDE SERPL-SCNC: 107 MMOL/L (ref 98–107)
CO2 SERPL-SCNC: 18 MMOL/L (ref 21–32)
CREAT SERPL-MCNC: 4.1 MG/DL (ref 0.55–1.3)
EOSINOPHIL NFR BLD AUTO: 2.8 % (ref 0–3)
ERYTHROCYTE [DISTWIDTH] IN BLOOD BY AUTOMATED COUNT: 14.3 % (ref 11.6–14.8)
HCT VFR BLD CALC: 27.9 % (ref 42–52)
HGB BLD-MCNC: 9 G/DL (ref 14.2–18)
LYMPHOCYTES NFR BLD AUTO: 18 % (ref 20–45)
MCV RBC AUTO: 92 FL (ref 80–99)
MONOCYTES NFR BLD AUTO: 9.3 % (ref 1–10)
NEUTROPHILS NFR BLD AUTO: 68.8 % (ref 45–75)
PLATELET # BLD: 332 K/UL (ref 150–450)
POTASSIUM SERPL-SCNC: 4 MMOL/L (ref 3.5–5.1)
RBC # BLD AUTO: 3.04 M/UL (ref 4.7–6.1)
SODIUM SERPL-SCNC: 139 MMOL/L (ref 136–145)
WBC # BLD AUTO: 9.8 K/UL (ref 4.8–10.8)

## 2018-01-17 RX ADMIN — INSULIN ASPART SCH UNITS: 100 INJECTION, SOLUTION INTRAVENOUS; SUBCUTANEOUS at 17:46

## 2018-01-17 RX ADMIN — INSULIN ASPART SCH UNITS: 100 INJECTION, SOLUTION INTRAVENOUS; SUBCUTANEOUS at 06:12

## 2018-01-17 RX ADMIN — ERYTHROPOIETIN SCH UNITS: 20000 INJECTION, SOLUTION INTRAVENOUS; SUBCUTANEOUS at 22:03

## 2018-01-17 RX ADMIN — DOXAZOSIN MESYLATE SCH MG: 4 TABLET ORAL at 09:06

## 2018-01-17 RX ADMIN — DOXAZOSIN MESYLATE SCH MG: 4 TABLET ORAL at 17:39

## 2018-01-17 RX ADMIN — BENZTROPINE MESYLATE SCH MG: 1 TABLET ORAL at 21:50

## 2018-01-17 RX ADMIN — BENZTROPINE MESYLATE SCH MG: 1 TABLET ORAL at 09:07

## 2018-01-17 RX ADMIN — METOPROLOL TARTRATE SCH MG: 25 TABLET, FILM COATED ORAL at 09:07

## 2018-01-17 RX ADMIN — HYDRALAZINE HYDROCHLORIDE PRN MG: 10 TABLET ORAL at 12:28

## 2018-01-17 RX ADMIN — INSULIN ASPART SCH UNITS: 100 INJECTION, SOLUTION INTRAVENOUS; SUBCUTANEOUS at 22:13

## 2018-01-17 RX ADMIN — METOPROLOL TARTRATE SCH MG: 25 TABLET, FILM COATED ORAL at 21:51

## 2018-01-17 RX ADMIN — INSULIN ASPART SCH UNITS: 100 INJECTION, SOLUTION INTRAVENOUS; SUBCUTANEOUS at 12:30

## 2018-01-17 RX ADMIN — SODIUM CHLORIDE SCH MLS/HR: 0.9 INJECTION INTRAVENOUS at 22:02

## 2018-01-17 RX ADMIN — LEVOTHYROXINE SODIUM SCH MCG: 25 TABLET ORAL at 06:13

## 2018-01-17 NOTE — NEPHROLOGY PROGRESS NOTE
Assessment/Plan


Problem List:  


(1) Malignant hypertension (arteriolar nephrosclerosis)


(2) Diabetes


(3) UTI (urinary tract infection)


(4) CKD (chronic kidney disease) stage 5, GFR less than 15 ml/min


(5) Anemia in chronic kidney disease


(6) Leukocytosis


Plan


continue epogen, venofer, bp meds titrate  needs av fistula soon, will change 

from amlodipine to nifedipine 60 bid





Subjective


Constitutional:  Reports: weakness


HEENT:  Reports: no symptoms


Genitourinary:  Reports: no symptoms


Neurologic/Psychiatric:  Reports: no symptoms





Objective


Objective





Last 24 Hour Vital Signs








  Date Time  Temp Pulse Resp B/P (MAP) Pulse Ox O2 Delivery O2 Flow Rate FiO2


 


1/17/18 15:50    182/97    


 


1/17/18 15:30 97.7 58 18 179/76 98   


 


1/17/18 13:13  64 20 158/89 97   


 


1/17/18 13:00  58      





  83      





  82      


 


1/17/18 12:28    192/91    


 


1/17/18 11:45 97.9 55 18 192/85 97   


 


1/17/18 09:08  73  155/86    


 


1/17/18 09:07  73  155/86    


 


1/17/18 04:03 98.2 73 19 155/86 96   


 


1/17/18 00:04 98.2 59 19 147/71 96   


 


1/16/18 20:38  63  173/97    


 


1/16/18 20:07 98.2 63 19 173/97 97   


 


1/16/18 18:00  62      





  79      





  84      


 


1/16/18 17:09  58  180/91    

















Intake and Output  


 


 1/16/18 1/17/18





 19:00 07:00


 


Intake Total 480 ml 


 


Balance 480 ml 


 


  


 


Intake Oral 480 ml 


 


# Voids 2 2








Laboratory Tests


1/17/18 06:35: 


White Blood Count 9.8, Red Blood Count 3.04L, Hemoglobin 9.0L, Hematocrit 27.9L

, Mean Corpuscular Volume 92, Mean Corpuscular Hemoglobin 29.8, Mean 

Corpuscular Hemoglobin Concent 32.4, Red Cell Distribution Width 14.3, Platelet 

Count 332, Mean Platelet Volume 6.2L, Neutrophils (%) (Auto) 68.8, Lymphocytes (

%) (Auto) 18.0L, Monocytes (%) (Auto) 9.3, Eosinophils (%) (Auto) 2.8, 

Basophils (%) (Auto) 1.2, Sodium Level 139, Potassium Level 4.0, Chloride Level 

107, Carbon Dioxide Level 18L, Anion Gap 14, Blood Urea Nitrogen 36H, 

Creatinine 4.1H, Estimat Glomerular Filtration Rate 17.6, Glucose Level 136H, 

Calcium Level 8.9


Height (Feet):  6


Height (Inches):  2.00


Weight (Pounds):  248


General Appearance:  no apparent distress, alert


EENT:  normal ENT inspection


Neck:  normal alignment


Cardiovascular:  normal rate


Respiratory/Chest:  lungs clear


Abdomen:  non tender


Extremities:  other - no edema


Neurologic:  CNs II-XII grossly normal











MACKENZIE MAY Jan 17, 2018 16:59

## 2018-01-17 NOTE — PROGRESS NOTE
DATE:  01/16/2018



CARDIOLOGY PROGRESS NOTE



SUBJECTIVE:  The patient feels better.  Blood pressure today is

significantly elevated.



OBJECTIVE:

VITAL SIGNS:  Blood pressure 144/70 to 191/86.  The patient is

nonorthostatic, heart rate 61, respiratory rate 20.  Afebrile.

NECK:  Supple.

LUNGS:  Clear.

CARDIAC:  Regular.  Normal S1, S2 with a fourth heart sound.

ABDOMEN:  Soft.

EXTREMITIES:  Mild scrotal edema.  Trace lower extremity edema.



IMPRESSION:

1. Malignant hypertension.

2. Urinary tract infection with sepsis.

3. Acute on chronic renal failure.

4. Anemia of chronic kidney disease, status post transfusions.

5. Type 2 diabetes mellitus.

6. Prolactin secreting pituitary microadenoma.



PLAN:

1. Continue optimizing antihypertensives.

2. Discontinue intravenous fluids.

3. Continue antimicrobials.

4. Titrate diabetic regimen.

5. Outpatient plans for AV fistula and anticipation of dialysis in the

future.

6. Continue Epogen and iron replacement at this time.









  ______________________________________________

  Edmar Munguia M.D.





DR:  DK/PM

D:  01/17/2018 01:47

T:  01/17/2018 06:17

JOB#:  [id number]

CC:

## 2018-01-18 VITALS — DIASTOLIC BLOOD PRESSURE: 92 MMHG | SYSTOLIC BLOOD PRESSURE: 174 MMHG

## 2018-01-18 VITALS — SYSTOLIC BLOOD PRESSURE: 165 MMHG | DIASTOLIC BLOOD PRESSURE: 89 MMHG

## 2018-01-18 VITALS — DIASTOLIC BLOOD PRESSURE: 88 MMHG | SYSTOLIC BLOOD PRESSURE: 161 MMHG

## 2018-01-18 VITALS — DIASTOLIC BLOOD PRESSURE: 80 MMHG | SYSTOLIC BLOOD PRESSURE: 154 MMHG

## 2018-01-18 VITALS — DIASTOLIC BLOOD PRESSURE: 71 MMHG | SYSTOLIC BLOOD PRESSURE: 144 MMHG

## 2018-01-18 RX ADMIN — METOPROLOL TARTRATE SCH MG: 25 TABLET, FILM COATED ORAL at 08:37

## 2018-01-18 RX ADMIN — HYDRALAZINE HYDROCHLORIDE PRN MG: 10 TABLET ORAL at 12:18

## 2018-01-18 RX ADMIN — INSULIN ASPART SCH UNITS: 100 INJECTION, SOLUTION INTRAVENOUS; SUBCUTANEOUS at 12:00

## 2018-01-18 RX ADMIN — BENZTROPINE MESYLATE SCH MG: 1 TABLET ORAL at 08:36

## 2018-01-18 RX ADMIN — LEVOTHYROXINE SODIUM SCH MCG: 25 TABLET ORAL at 05:52

## 2018-01-18 RX ADMIN — HYDRALAZINE HYDROCHLORIDE PRN MG: 10 TABLET ORAL at 00:01

## 2018-01-18 RX ADMIN — INSULIN ASPART SCH UNITS: 100 INJECTION, SOLUTION INTRAVENOUS; SUBCUTANEOUS at 05:54

## 2018-01-18 RX ADMIN — DOXAZOSIN MESYLATE SCH MG: 4 TABLET ORAL at 08:35

## 2018-01-18 NOTE — PROGRESS NOTE
DATE:  01/17/2018



CARDIOLOGY PROGRESS NOTE



SUBJECTIVE:  The patient was seen and evaluated.  Case was discussed with

his sister and Dr. Wall.  The patient's blood pressure remains quite

elevated today.  He has no new complaints.



OBJECTIVE:

VITAL SIGNS:  Blood pressure 152/92, pulse 79, and respirations 21.

Earlier, blood pressure 182/97.

LUNGS:  Clear.

CARDIAC:  Regular.  Normal S1, S2.

ABDOMEN:  Soft.

EXTREMITIES:  No edema.



IMPRESSION:

1. Accelerated hypertension.

2. Malignant range blood pressure.

3. Chronic kidney disease, stage 5.

4. Diabetic nephropathy.

5. Type 2 diabetes mellitus.

6. Urinary tract infection.

7. Recovered leukocytosis.

8. Anemia of chronic kidney disease.



PLAN:

1. Optimize antihypertensives.

2. Discharge planning with outpatient follow up.

3. Anticipated placement of dialysis access for long-term use to

follow.









  ______________________________________________

  Edmar Munguia M.D.





DR:  KALLIE

D:  01/18/2018 00:03

T:  01/18/2018 00:14

JOB#:  [id number]

CC:

## 2018-01-18 NOTE — DISCHARGE SUMMARY
DATE OF ADMISSION:  01/13/2018



DATE OF DISCHARGE:  01/18/2018



DISPOSITION:  Home.



CONDITION:  Stable.



FOLLOWUP:  Follow up Dr. Munguia, Dr. Nunez, and Dr. Wall.



ACTIVITY:  Ad-lilo.



DIET:  1800-calorie ADA, no added salt.



MEDICATIONS:  Listed on discharge medication list.



PHYSICAL EXAMINATION:

VITAL SIGNS:  On discharge, afebrile, blood pressure of 165/89, pulse 65,

and respirations 20.

LUNGS:  Clear.

CARDIAC:  Regular.  Normal S1, S2.

ABDOMEN:  Soft.  No edema.



HOSPITAL COURSE:  Progressive anemia and worsening renal function prompted

hospitalization of this 67-year-old  male with a

longstanding history of hypertensive heart and renal disease with type 2

diabetes mellitus.  He also noted dysuria and was noted to have signs of

an acute urinary infection with leukocytosis.



The patient is to transfuse packed red blood cells to maintain

hemoglobin level above 7.5 g and started on Epogen and iron replacement.

He had renal function studies performed and was felt to have chronic

kidney disease stage 5 and will be considered for dialysis as an

outpatient with fistula to be placed in the interim.



The patient responded to empiric antibiotics.  Cultures of the urine

were negative.  White count normalized, and urinary symptoms resolved.



The patient's blood pressure remains difficult to manage and his regimen

was advanced.  During this hospital stay, it was felt that he should be

observed further as an outpatient for secondary orthostasis prior to

tightening his blood pressure control further and was to follow up within

1 week's time.  Glucose management with oral therapy was continued and

without change.



The patient's sister who is a DPA was made aware of hospital course and

further plan of care including dialysis, long term.



FINAL DIAGNOSES:

1. Acute on chronic renal failure.

2. Severe anemia.

3. Malignant hypertension.

4. Hypertensive heart disease.

5. Type 2 diabetes mellitus.

6. Urinary tract infection.

7. Prostatic hypertrophy.

8. Sepsis.









  ______________________________________________

  Edmar Munguia M.D.





DR:  MAY

D:  01/18/2018 18:59

T:  01/18/2018 21:24

JOB#:  [id number]

CC:

## 2018-04-06 ENCOUNTER — HOSPITAL ENCOUNTER (OUTPATIENT)
Dept: HOSPITAL 72 - ULS | Age: 68
Discharge: HOME | End: 2018-04-06
Payer: MEDICARE

## 2018-04-06 DIAGNOSIS — N50.89: Primary | ICD-10-CM

## 2018-04-06 PROCEDURE — 76870 US EXAM SCROTUM: CPT

## 2018-08-21 ENCOUNTER — HOSPITAL ENCOUNTER (INPATIENT)
Dept: HOSPITAL 72 - EMR | Age: 68
LOS: 23 days | Discharge: HOME | DRG: 194 | End: 2018-09-13
Payer: MEDICARE

## 2018-08-21 VITALS — DIASTOLIC BLOOD PRESSURE: 97 MMHG | SYSTOLIC BLOOD PRESSURE: 174 MMHG

## 2018-08-21 VITALS — SYSTOLIC BLOOD PRESSURE: 174 MMHG | DIASTOLIC BLOOD PRESSURE: 97 MMHG

## 2018-08-21 VITALS — SYSTOLIC BLOOD PRESSURE: 177 MMHG | DIASTOLIC BLOOD PRESSURE: 88 MMHG

## 2018-08-21 VITALS — HEIGHT: 74 IN | WEIGHT: 257 LBS | BODY MASS INDEX: 32.98 KG/M2

## 2018-08-21 DIAGNOSIS — E11.22: ICD-10-CM

## 2018-08-21 DIAGNOSIS — N39.0: ICD-10-CM

## 2018-08-21 DIAGNOSIS — I50.84: ICD-10-CM

## 2018-08-21 DIAGNOSIS — I21.4: ICD-10-CM

## 2018-08-21 DIAGNOSIS — F25.9: ICD-10-CM

## 2018-08-21 DIAGNOSIS — F02.80: ICD-10-CM

## 2018-08-21 DIAGNOSIS — I50.33: ICD-10-CM

## 2018-08-21 DIAGNOSIS — I95.1: ICD-10-CM

## 2018-08-21 DIAGNOSIS — E11.40: ICD-10-CM

## 2018-08-21 DIAGNOSIS — E11.21: ICD-10-CM

## 2018-08-21 DIAGNOSIS — D63.1: ICD-10-CM

## 2018-08-21 DIAGNOSIS — G20: ICD-10-CM

## 2018-08-21 DIAGNOSIS — E03.9: ICD-10-CM

## 2018-08-21 DIAGNOSIS — E87.2: ICD-10-CM

## 2018-08-21 DIAGNOSIS — R00.1: ICD-10-CM

## 2018-08-21 DIAGNOSIS — I13.2: Primary | ICD-10-CM

## 2018-08-21 DIAGNOSIS — E44.0: ICD-10-CM

## 2018-08-21 DIAGNOSIS — N18.6: ICD-10-CM

## 2018-08-21 LAB
ADD MANUAL DIFF: YES
ALBUMIN SERPL-MCNC: 2.8 G/DL (ref 3.4–5)
ALBUMIN/GLOB SERPL: 0.8 {RATIO} (ref 1–2.7)
ALP SERPL-CCNC: 69 U/L (ref 46–116)
ALT SERPL-CCNC: 29 U/L (ref 12–78)
ANION GAP SERPL CALC-SCNC: 15 MMOL/L (ref 5–15)
APTT BLD: 27 SEC (ref 23–33)
AST SERPL-CCNC: 15 U/L (ref 15–37)
BILIRUB SERPL-MCNC: 0.2 MG/DL (ref 0.2–1)
BUN SERPL-MCNC: 40 MG/DL (ref 7–18)
CALCIUM SERPL-MCNC: 7.7 MG/DL (ref 8.5–10.1)
CHLORIDE SERPL-SCNC: 112 MMOL/L (ref 98–107)
CO2 SERPL-SCNC: 17 MMOL/L (ref 21–32)
CREAT SERPL-MCNC: 6.1 MG/DL (ref 0.55–1.3)
ERYTHROCYTE [DISTWIDTH] IN BLOOD BY AUTOMATED COUNT: 16.4 % (ref 11.6–14.8)
GLOBULIN SER-MCNC: 3.3 G/DL
HCT VFR BLD CALC: 23 % (ref 42–52)
HGB BLD-MCNC: 7.9 G/DL (ref 14.2–18)
INR PPP: 1.1 (ref 0.9–1.1)
MCV RBC AUTO: 93 FL (ref 80–99)
PLATELET # BLD: 322 K/UL (ref 150–450)
POTASSIUM SERPL-SCNC: 4.3 MMOL/L (ref 3.5–5.1)
RBC # BLD AUTO: 2.48 M/UL (ref 4.7–6.1)
SODIUM SERPL-SCNC: 144 MMOL/L (ref 136–145)
WBC # BLD AUTO: 9.8 K/UL (ref 4.8–10.8)

## 2018-08-21 PROCEDURE — 99291 CRITICAL CARE FIRST HOUR: CPT

## 2018-08-21 PROCEDURE — 94760 N-INVAS EAR/PLS OXIMETRY 1: CPT

## 2018-08-21 PROCEDURE — 76000 FLUOROSCOPY <1 HR PHYS/QHP: CPT

## 2018-08-21 PROCEDURE — 86850 RBC ANTIBODY SCREEN: CPT

## 2018-08-21 PROCEDURE — 80053 COMPREHEN METABOLIC PANEL: CPT

## 2018-08-21 PROCEDURE — 84100 ASSAY OF PHOSPHORUS: CPT

## 2018-08-21 PROCEDURE — 82270 OCCULT BLOOD FECES: CPT

## 2018-08-21 PROCEDURE — 87517 HEPATITIS B DNA QUANT: CPT

## 2018-08-21 PROCEDURE — 85007 BL SMEAR W/DIFF WBC COUNT: CPT

## 2018-08-21 PROCEDURE — 80048 BASIC METABOLIC PNL TOTAL CA: CPT

## 2018-08-21 PROCEDURE — 84550 ASSAY OF BLOOD/URIC ACID: CPT

## 2018-08-21 PROCEDURE — 78452 HT MUSCLE IMAGE SPECT MULT: CPT

## 2018-08-21 PROCEDURE — 87340 HEPATITIS B SURFACE AG IA: CPT

## 2018-08-21 PROCEDURE — 86904 BLOOD TYPING PATIENT SERUM: CPT

## 2018-08-21 PROCEDURE — 81050 URINALYSIS VOLUME MEASURE: CPT

## 2018-08-21 PROCEDURE — 82575 CREATININE CLEARANCE TEST: CPT

## 2018-08-21 PROCEDURE — 86703 HIV-1/HIV-2 1 RESULT ANTBDY: CPT

## 2018-08-21 PROCEDURE — 86920 COMPATIBILITY TEST SPIN: CPT

## 2018-08-21 PROCEDURE — 85610 PROTHROMBIN TIME: CPT

## 2018-08-21 PROCEDURE — 86803 HEPATITIS C AB TEST: CPT

## 2018-08-21 PROCEDURE — 93970 EXTREMITY STUDY: CPT

## 2018-08-21 PROCEDURE — 36415 COLL VENOUS BLD VENIPUNCTURE: CPT

## 2018-08-21 PROCEDURE — 86901 BLOOD TYPING SEROLOGIC RH(D): CPT

## 2018-08-21 PROCEDURE — 93017 CV STRESS TEST TRACING ONLY: CPT

## 2018-08-21 PROCEDURE — 85025 COMPLETE CBC W/AUTO DIFF WBC: CPT

## 2018-08-21 PROCEDURE — 84156 ASSAY OF PROTEIN URINE: CPT

## 2018-08-21 PROCEDURE — 83880 ASSAY OF NATRIURETIC PEPTIDE: CPT

## 2018-08-21 PROCEDURE — 93005 ELECTROCARDIOGRAM TRACING: CPT

## 2018-08-21 PROCEDURE — 71045 X-RAY EXAM CHEST 1 VIEW: CPT

## 2018-08-21 PROCEDURE — 82962 GLUCOSE BLOOD TEST: CPT

## 2018-08-21 PROCEDURE — 84484 ASSAY OF TROPONIN QUANT: CPT

## 2018-08-21 PROCEDURE — 85730 THROMBOPLASTIN TIME PARTIAL: CPT

## 2018-08-21 PROCEDURE — 83970 ASSAY OF PARATHORMONE: CPT

## 2018-08-21 PROCEDURE — 80061 LIPID PANEL: CPT

## 2018-08-21 PROCEDURE — 83735 ASSAY OF MAGNESIUM: CPT

## 2018-08-21 PROCEDURE — 86900 BLOOD TYPING SEROLOGIC ABO: CPT

## 2018-08-21 PROCEDURE — 84443 ASSAY THYROID STIM HORMONE: CPT

## 2018-08-21 NOTE — EMERGENCY ROOM REPORT
History of Present Illness


General


Chief Complaint:  Transfusion


Source:  Patient





Present Illness


HPI


68-year-old male, history of hypertension, arthritis, chronic kidney disease, 

sent in by Dr. Munguia for blood transfusion and to prepare for dialysis.  

Patient has had 2 blood transfusions before.  Denies any hematemesis no black 

or bloody stool.  He says that he has had a normal colonoscopy a few years ago.

  Likely anemia from chronic kidney disease.  He was last seen here in May but 

has not required dialysis until now.  He is still making urine.  Does complain 

of some dyspnea on exertion, no chest pain.  No other complaints


Allergies:  


Coded Allergies:  


     No Known Allergies (Unverified , 9/25/17)





Patient History


Past Medical History:  see triage record


Past Surgical History:  none


Pertinent Family History:  none


Reviewed Nursing Documentation:  PMH: Agreed; PSxH: Agreed





Nursing Documentation-PMH


Hx Cardiac Problems:  Yes - Arthritis, herniated disc


Hx Hypertension:  Yes


Hx Asthma:  No


Hx Diabetes:  Yes


Hx Cancer:  No - Brain tumor


Hx Gastrointestinal Problems:  Yes - Acid Reflux


Hx Neurological Problems:  Yes





Review of Systems


All Other Systems:  negative except mentioned in HPI





Physical Exam





Vital Signs








  Date Time  Temp Pulse Resp B/P (MAP) Pulse Ox O2 Delivery O2 Flow Rate FiO2


 


8/21/18 16:23 98.4 83 20 177/88 93 Room Air  





 98.4       








Sp02 EP Interpretation:  reviewed, normal


General Appearance:  alert - Well-developed elderly male, conversing 

appropriately, calm and cooperative, does appear slightly pale


Head:  normocephalic, atraumatic


Eyes:  bilateral eye normal inspection, bilateral eye PERRL, bilateral eye EOMI


ENT:  normal ENT inspection, normal pharynx, normal voice, moist mucus membranes


Neck:  normal inspection, full range of motion, supple


Respiratory:  normal inspection, lungs clear, normal breath sounds, no 

respiratory distress, no retraction, no wheezing, speaking full sentences, 

chest symmetrical


Cardiovascular #1:  normal inspection, regular rate, rhythm, normal capillary 

refill


Cardiovascular #2:  2+ radial (R), 2+ radial (L)


Gastrointestinal:  normal inspection, non tender, soft, non-distended, no 

guarding


Genitourinary:  no CVA tenderness


Musculoskeletal:  normal inspection, back normal, normal range of motion, non-

tender


Neurologic:  normal inspection, alert, oriented x3, responsive, motor strength/

tone normal, sensory intact, normal gait, speech normal


Psychiatric:  normal inspection, judgement/insight normal, memory normal


Skin:  no rash, warm/dry, well hydrated, normal turgor, pallor





Procedures


Critical Care Time


Critical Care Time


40 minutes of CC time


68-year-old male, end-stage renal disease, found to have elevated troponin, 

requiring close cardiopulmonary monitoring





Anticipate admission to Tele 


CC time also includes review of labs, review of EMR, discussion with family and 

paperwork from SNF, d/w hospitalist


CC could include dosing of pressors, additional Abx


CC time does not include procedures





Medical Decision Making


Diagnostic Impression:  


 Primary Impression:  


 Anemia in chronic kidney disease


 Additional Impressions:  


 Elevated troponin


 ESRD (end stage renal disease)


ER Course


68-year-old male, presenting with requiring transfusion as well as requiring 

dialysis





DDX:


Anemia, end-stage renal disease





Plan:


Obtain labs, including type and screen, possible transfusion, ua, EKG, CXR





ER course:


Patient has been monitored during ED stay, HD stable


not c/o CP but troponin elevated, also with ESRD


aspirin given


patient with low h/h, and symptomatic, will transfuse 2 units in ED








Disposition:


Patient is to be admitted to Tele


D/W hospitalist Dr Munguia





Please note that this Emergency Department Report was dictated using [employer] technology software, occasionally this can lead to 

erroneous entry secondary to interpretation by the dictation equipment.





EKG Diagnostic Results


EP Interpretation: Yes


Rate: normal


Rhythm: NSR


ST Segments: TW FLATTENING aVL, I


ASA given to patient: No





Rhythm Strip


EP Interpretation: Yes


Rate: 79


Rhythm: NSR, no PVCs, no ectopy





Chest X-ray


CXR: Ordered: Yes


1 view


Indication: Dyspnea


EP interpretation: Yes


Interpretation: No consolidation, no effusion, no PTX, no acute cardiopulmonary 

disease


Impression: No acute disease





Electronically signed by Renny Baxter MD





Laboratory Tests








Test


  8/21/18


16:45


 


White Blood Count


  9.8 K/UL


(4.8-10.8)


 


Red Blood Count


  2.48 M/UL


(4.70-6.10)  L


 


Hemoglobin


  7.9 G/DL


(14.2-18.0)  L


 


Hematocrit


  23.0 %


(42.0-52.0)  L


 


Mean Corpuscular Volume 93 FL (80-99)  


 


Mean Corpuscular Hemoglobin


  31.9 PG


(27.0-31.0)  H


 


Mean Corpuscular Hemoglobin


Concent 34.3 G/DL


(32.0-36.0)


 


Red Cell Distribution Width


  16.4 %


(11.6-14.8)  H


 


Platelet Count


  322 K/UL


(150-450)


 


Mean Platelet Volume


  6.1 FL


(6.5-10.1)  L


 


Neutrophils (%) (Auto)


  % (45.0-75.0)


 


 


Lymphocytes (%) (Auto)


  % (20.0-45.0)


 


 


Monocytes (%) (Auto)  % (1.0-10.0)  


 


Eosinophils (%) (Auto)  % (0.0-3.0)  


 


Basophils (%) (Auto)  % (0.0-2.0)  


 


Neutrophils % (Manual) Pending  


 


Lymphocytes % (Manual) Pending  


 


Platelet Estimate Pending  


 


Platelet Morphology Pending  


 


Prothrombin Time


  11.2 SEC


(9.30-11.50)


 


Prothrombin Time INR 1.1 (0.9-1.1)  


 


PTT


  27 SEC (23-33)


 


 


Sodium Level


  144 MMOL/L


(136-145)


 


Potassium Level


  4.3 MMOL/L


(3.5-5.1)


 


Chloride Level


  112 MMOL/L


()  H


 


Carbon Dioxide Level


  17 MMOL/L


(21-32)  L


 


Anion Gap


  15 mmol/L


(5-15)


 


Blood Urea Nitrogen


  40 mg/dL


(7-18)  H


 


Creatinine


  6.1 MG/DL


(0.55-1.30)  H


 


Estimate Glomerular


Filtration Rate 11.2 mL/min


(>60)


 


Glucose Level


  131 MG/DL


()  H


 


Calcium Level


  7.7 MG/DL


(8.5-10.1)  L


 


Total Bilirubin


  0.2 MG/DL


(0.2-1.0)


 


Aspartate Amino Transferase


(AST) 15 U/L (15-37)


 


 


Alanine Aminotransferase (ALT)


  29 U/L (12-78)


 


 


Alkaline Phosphatase


  69 U/L


()


 


Troponin I


  1.067 ng/mL


(0.000-0.056)


 


Pro-B-Type Natriuretic Peptide


  38790 pg/mL


(0-125)  H


 


Total Protein


  6.1 G/DL


(6.4-8.2)  L


 


Albumin


  2.8 G/DL


(3.4-5.0)  L


 


Globulin 3.3 g/dL  


 


Albumin/Globulin Ratio


  0.8 (1.0-2.7)


L











Last Vital Signs








  Date Time  Temp Pulse Resp B/P (MAP) Pulse Ox O2 Delivery O2 Flow Rate FiO2


 


8/21/18 16:23 98.4 83 20 177/88 93 Room Air  





 98.4       








Disposition:  ADMITTED AS INPATIENT


Condition:  Serious











Renny Baxter M.D. Aug 21, 2018 16:39

## 2018-08-21 NOTE — DIAGNOSTIC IMAGING REPORT
Indication: Chest pain

 

Technique: One view of the chest

 

Comparison: 1/13/2018

 

Findings: Improved inspiration on the current exam. There is some atelectasis at the

left lateral lung base. Lungs and pleural spaces are otherwise clear. Heart size is

normal

 

Impression: Minimal left lateral basilar atelectasis. No acute process otherwise

## 2018-08-22 VITALS — SYSTOLIC BLOOD PRESSURE: 163 MMHG | DIASTOLIC BLOOD PRESSURE: 84 MMHG

## 2018-08-22 VITALS — SYSTOLIC BLOOD PRESSURE: 153 MMHG | DIASTOLIC BLOOD PRESSURE: 77 MMHG

## 2018-08-22 VITALS — DIASTOLIC BLOOD PRESSURE: 84 MMHG | SYSTOLIC BLOOD PRESSURE: 146 MMHG

## 2018-08-22 VITALS — SYSTOLIC BLOOD PRESSURE: 150 MMHG | DIASTOLIC BLOOD PRESSURE: 84 MMHG

## 2018-08-22 VITALS — SYSTOLIC BLOOD PRESSURE: 161 MMHG | DIASTOLIC BLOOD PRESSURE: 102 MMHG

## 2018-08-22 VITALS — SYSTOLIC BLOOD PRESSURE: 179 MMHG | DIASTOLIC BLOOD PRESSURE: 89 MMHG

## 2018-08-22 LAB
ADD MANUAL DIFF: YES
ALBUMIN SERPL-MCNC: 2.6 G/DL (ref 3.4–5)
ALBUMIN/GLOB SERPL: 0.7 {RATIO} (ref 1–2.7)
ALP SERPL-CCNC: 64 U/L (ref 46–116)
ALT SERPL-CCNC: 22 U/L (ref 12–78)
ANION GAP SERPL CALC-SCNC: 15 MMOL/L (ref 5–15)
AST SERPL-CCNC: 10 U/L (ref 15–37)
BILIRUB SERPL-MCNC: 0.2 MG/DL (ref 0.2–1)
BUN SERPL-MCNC: 39 MG/DL (ref 7–18)
CALCIUM SERPL-MCNC: 7.4 MG/DL (ref 8.5–10.1)
CHLORIDE SERPL-SCNC: 112 MMOL/L (ref 98–107)
CHOLEST SERPL-MCNC: 194 MG/DL (ref ?–200)
CO2 SERPL-SCNC: 18 MMOL/L (ref 21–32)
CREAT SERPL-MCNC: 5.9 MG/DL (ref 0.55–1.3)
CREAT SERPL-MCNC: 5.9 MG/DL (ref 0.55–1.3)
ERYTHROCYTE [DISTWIDTH] IN BLOOD BY AUTOMATED COUNT: 16.4 % (ref 11.6–14.8)
GLOBULIN SER-MCNC: 3.8 G/DL
HCT VFR BLD CALC: 24.4 % (ref 42–52)
HDLC SERPL-MCNC: 50 MG/DL (ref 40–60)
HGB BLD-MCNC: 7.8 G/DL (ref 14.2–18)
MCV RBC AUTO: 92 FL (ref 80–99)
PLATELET # BLD: 295 K/UL (ref 150–450)
POTASSIUM SERPL-SCNC: 4.2 MMOL/L (ref 3.5–5.1)
RBC # BLD AUTO: 2.64 M/UL (ref 4.7–6.1)
SODIUM SERPL-SCNC: 145 MMOL/L (ref 136–145)
TRIGL SERPL-MCNC: 143 MG/DL (ref 30–150)
WBC # BLD AUTO: 10.7 K/UL (ref 4.8–10.8)

## 2018-08-22 RX ADMIN — ERYTHROPOIETIN SCH UNITS: 20000 INJECTION, SOLUTION INTRAVENOUS; SUBCUTANEOUS at 22:20

## 2018-08-22 RX ADMIN — SODIUM CHLORIDE SCH MLS/HR: 0.9 INJECTION INTRAVENOUS at 22:20

## 2018-08-22 RX ADMIN — INSULIN ASPART SCH UNITS: 100 INJECTION, SOLUTION INTRAVENOUS; SUBCUTANEOUS at 22:23

## 2018-08-22 RX ADMIN — BENZTROPINE MESYLATE SCH MG: 1 TABLET ORAL at 09:33

## 2018-08-22 RX ADMIN — METOPROLOL TARTRATE SCH MG: 50 TABLET, FILM COATED ORAL at 22:20

## 2018-08-22 RX ADMIN — HYDRALAZINE HYDROCHLORIDE SCH MG: 50 TABLET ORAL at 18:56

## 2018-08-22 RX ADMIN — DOXAZOSIN MESYLATE SCH MG: 4 TABLET ORAL at 09:32

## 2018-08-22 RX ADMIN — INSULIN ASPART SCH UNITS: 100 INJECTION, SOLUTION INTRAVENOUS; SUBCUTANEOUS at 11:30

## 2018-08-22 RX ADMIN — ASPIRIN 81 MG SCH MG: 81 TABLET ORAL at 09:33

## 2018-08-22 RX ADMIN — ATORVASTATIN CALCIUM SCH MG: 20 TABLET, FILM COATED ORAL at 22:20

## 2018-08-22 RX ADMIN — DOXAZOSIN MESYLATE SCH MG: 4 TABLET ORAL at 00:57

## 2018-08-22 RX ADMIN — DOXAZOSIN MESYLATE SCH MG: 4 TABLET ORAL at 22:20

## 2018-08-22 RX ADMIN — INSULIN ASPART SCH UNITS: 100 INJECTION, SOLUTION INTRAVENOUS; SUBCUTANEOUS at 16:30

## 2018-08-22 RX ADMIN — METOPROLOL TARTRATE SCH MG: 50 TABLET, FILM COATED ORAL at 09:33

## 2018-08-22 RX ADMIN — INSULIN ASPART SCH UNITS: 100 INJECTION, SOLUTION INTRAVENOUS; SUBCUTANEOUS at 06:11

## 2018-08-22 NOTE — CONSULTATION
DATE OF CONSULTATION:  08/21/2018



CARDIOLOGY CONSULTATION



CONSULTING PHYSICIAN:  Edmar Munguia M.D.



REQUESTING PHYSICIAN:  Michael Nunez M.D.



REASON FOR ADMISSION:  Worsening renal failure with anemia and elevated

troponin level.



HISTORY OF PRESENT ILLNESS:  This 68-year-old male has been monitored for

several months with progressive renal failure due to diabetes and

hypertension.  He was seen in my office about a week ago and had a

creatinine of almost 7 with a hemoglobin of 7.3.  Discussions were

undertaken and he agreed to proceed with a AV fistula placement.  However,

today he was seen in the emergency room for weakness and noted to have an

elevated troponin level.  He denies chest pain, but has been somewhat more

fatigued and short of breath than usual with activity.  He has received

blood transfusions x2 in the past for a worsening anemia due to chronic

kidney disease.  He has had prior cardiovascular studies including an

exercise echocardiogram that revealed normal wall motion and ejection

fraction at peak exercise and stress.  His baseline echocardiogram has

revealed normal ejection fraction with concentric hypertrophy and mild

degenerative valve disease.



PAST MEDICAL HISTORY:

1. Hypertension with hypertensive heart disease.

2. Diastolic dysfunction.

3. Sinus bradycardia on beta-blockers.

4. Type 2 diabetes mellitus.

5. Diabetic nephropathy with chronic kidney disease.

6. Anemia of chronic kidney disease.

7. Degenerative disk disease.

8. History of pituitary adenoma.

9. Recent hematoma to right testis.

10. Schizoaffective disorder.

11. Hypothyroidism.



MEDICATIONS:  Prior to admission, reviewed and reconciled.



ALLERGIES:  None.



FAMILY HISTORY:  Notable for diabetes in his father and hypertension in his

mother.



SOCIAL HISTORY:  Negative for smoking, alcohol, or substance abuse.



REVIEW OF SYSTEMS:  A 10-point review of systems performed, all systems

negative other than noted above.



PHYSICAL EXAMINATION:

GENERAL:  Well developed, well nourished, in no acute distress.

VITAL SIGNS:  Afebrile, temperature 177/88, pulse 83, and respiratory rate

20.

HEENT:  Conjunctiva pink.  Sclerae are anicteric.  Oropharynx clear.

Mucous membranes moist.

NECK:  Supple.  Jugular venous pressure normal.

LUNGS:  Clear.

CARDIAC:  Regular rhythm and rate.  Normal S1 and S2 with a fourth heart

sound.

ABDOMEN:  Soft and nontender.

EXTREMITIES:  Good pulses.  No edema.

NEUROLOGIC:  Nonfocal.



LABORATORY AND DIAGNOSTIC DATA:  EKG sinus rhythm, nonspecific ST-T wave

changes.  Troponin 1.1.  Hemoglobin 7.9.  Potassium 4.3, BUN 40 and

creatinine 6.1.  Pro-natriuretic peptide 13,000.



IMPRESSION:

1. Acute myocardial infarction likely precipitated by severe anemia.

2. Acute on chronic diastolic congestive heart failure.

3. Chronic kidney disease stage 5.

4. Metabolic acidosis.

5. Type 2 diabetes mellitus.

6. Moderate protein-calorie malnutrition likely due to proteinuria.

7. Hypertensive heart disease.



PLAN:

1. Nasal oxygen.

2. Oral aspirin.

3. Beta-blockade.

4. Titrate antihypertensives.

5. Renal consult.

6. A 24-hour urine collection.

7. Transfuse for hemoglobin below 8 g.

8. Will likely need to place dialysis access once hemodynamically

stabilized.









  ______________________________________________

  Edmar Munguia M.D.





DR:  ALEXSANDRA

D:  08/22/2018 01:00

T:  08/22/2018 01:10

JOB#:  [id number]

CC:

## 2018-08-22 NOTE — CONSULTATION
DATE OF CONSULTATION:  08/22/2018



PODIATRIC CONSULTATION



CONSULTING PHYSICIAN:  Rosa Nolen D.P.M.



REFERRING PHYSICIAN:  Michael Nunez M.D.



REASON FOR CONSULTATION:  Diabetic foot evaluation.



HISTORY OF PRESENT ILLNESS:  This is a pleasant 68-year-old 

American male with history of diabetes and renal failure.  The patient

denies any current foot pain.



PAST MEDICAL HISTORY:  Significant for diabetes, hypertension, hypertensive

heart disease, diastolic congestive heart failure, history of diabetic

neuropathy, nephropathy, and renal failure.



MEDICATIONS:  Include Tylenol, Norvasc, aspirin, Lipitor, Cogentin,

dextrose, Cardura, Procrit, Pepcid, Prolixin, heparin, NovoLog, Synthroid,

iron, and Lopressor.



ALLERGIES:  The patient has no known drug allergies.



LOWER EXTREMITY PHYSICAL EXAMINATION:

VITAL SIGNS:  Blood pressure 161/102, pulse 97, respirations 20, and

temperature 96.9.

VASCULAR:  His dorsalis pedis and posterior tibial pulses are palpable 2/4

bilaterally with capillary refill time of less than 3 seconds

bilaterally.

NEUROLOGIC:  His light touch and proprioceptive sensation are intact to the

bilateral feet.

MUSCULOSKELETAL:  Foot and ankle range of motion is limited, but without

pain appreciated to bilateral feet and ankles.  Muscle strength is 5/5 in

all four quadrants with no tenderness to palpation of bilateral feet and

digits.

DERMATOLOGIC:  There is minimal pitting edema to bilateral feet and ankles.

There are no preulcerative lesions, hyperkeratotic lesions, interspace

lacerations, or abrasions appreciated to bilateral feet.



IMPRESSION:  This is a diabetic with end-stage renal disease and renal

failure.



PLAN:  Reviewed diabetic foot care guidelines at length with the patient.

Encourage daily foot checks and close monitoring.



Thank you, Dr. Nunez, for this consultation.









  ______________________________________________

  Rosa Nolen D.P.M.





DR:  KAYDEN

D:  08/22/2018 11:14

T:  08/22/2018 16:14

JOB#:  [id number]

CC:

## 2018-08-22 NOTE — HISTORY AND PHYSICAL REPORT
DATE OF ADMISSION:  08/21/2018



CHIEF COMPLAINT:  End-stage renal disease and renal failure.



HISTORY OF PRESENT ILLNESS:  The patient is a pleasant 68-year-old male,

well known to me.  He has a history of diabetes, hypertension,

hypertensive heart disease, diastolic congestive heart failure.  He has a

history of diabetic neuropathy, nephropathy, who presented with complaints

of worsening renal failure and for possible initiation of dialysis.  The

patient has otherwise been doing well.  He denies any fever or chills.  He

has had no chest pain.  Denies any anorexia.  No nausea.  No vomiting.  He

has had no melena or bright red blood per rectum.



PAST MEDICAL HISTORY:  As above.



PAST SURGICAL HISTORY:  None.



CURRENT MEDICATIONS:  Reconciled and reviewed.



ALLERGIES:  None.



FAMILY HISTORY:  Significant for history of diabetes, hypertension,

dementia, and history of Parkinson disease.



SOCIAL HISTORY:  There is no history of tobacco, ethanol, or drugs.



REVIEW OF SYSTEMS:  GENERAL:  No fever or chills.    HEENT:  No headaches

or visual changes.  CARDIOPULMONARY:  No chest pain or shortness of

breath.    GASTROINTESTINAL:  No nausea or vomiting.    GENITOURINARY:  No

urgency or frequency.  MUSCULOSKELETAL:  No joint pain or swelling.

NEUROLOGIC:  No evidence of seizures.



PHYSICAL EXAMINATION:

VITAL SIGNS:  Temperature 98, pulse 108, respirations 20, and blood

pressure 153/77.

GENERAL:  The patient is a well-developed male, in no apparent distress.

He is awake, alert, and oriented x4.

NECK:  Supple.  There is no lymphadenopathy.  No jugular venous distention.

 

HEART:  Regular rate and rhythm.

LUNGS:  Clear.

ABDOMEN:  Soft, nontender, and nondistended.

EXTREMITIES:  Without clubbing, cyanosis, or edema.



LABORATORY DATA:  Sodium 144, potassium 4.3, chloride 112, bicarb 17, BUN

40, creatinine was 6.  Troponin was 1.067.  Natriuretic peptide level  was

1300.  White count 9, hemoglobin 7.9, hematocrit 23, and platelet count

322.  Coags are normal.



ASSESSMENT:  This is a pleasant male with history of end-stage renal

disease, diabetes, hypertension, admitted for initiation of hemodialysis.

He is anemic.  He does have an elevated troponin although he has no

symptoms of chest pain.



PLAN:  Cardiology and Renal consultations.  Check an iron panel.  Check

stool for occult blood.  Antiplatelet therapy.  Transfuse as needed.

Dialysis access.









  ______________________________________________

  Michael Nunez M.D.





DR:  GURMEET

D:  08/22/2018 07:54

T:  08/22/2018 22:23

JOB#:  [id number]

CC:

## 2018-08-22 NOTE — CARDIOLOGY REPORT
--------------- APPROVED REPORT --------------





EKG Measurement

Heart [id number]

OH [id number]

[id number]

[id number]

[id number]





Sinus rhythm with premature supraventricular complexes

Nonspecific T wave abnormality

Prolonged QT

Abnormal ECG

## 2018-08-23 VITALS — SYSTOLIC BLOOD PRESSURE: 137 MMHG | DIASTOLIC BLOOD PRESSURE: 73 MMHG

## 2018-08-23 VITALS — DIASTOLIC BLOOD PRESSURE: 90 MMHG | SYSTOLIC BLOOD PRESSURE: 146 MMHG

## 2018-08-23 VITALS — SYSTOLIC BLOOD PRESSURE: 143 MMHG | DIASTOLIC BLOOD PRESSURE: 81 MMHG

## 2018-08-23 VITALS — SYSTOLIC BLOOD PRESSURE: 148 MMHG | DIASTOLIC BLOOD PRESSURE: 83 MMHG

## 2018-08-23 VITALS — DIASTOLIC BLOOD PRESSURE: 89 MMHG | SYSTOLIC BLOOD PRESSURE: 153 MMHG

## 2018-08-23 VITALS — DIASTOLIC BLOOD PRESSURE: 91 MMHG | SYSTOLIC BLOOD PRESSURE: 151 MMHG

## 2018-08-23 RX ADMIN — ASPIRIN 81 MG SCH MG: 81 TABLET ORAL at 08:28

## 2018-08-23 RX ADMIN — DOXAZOSIN MESYLATE SCH MG: 4 TABLET ORAL at 08:28

## 2018-08-23 RX ADMIN — HYDRALAZINE HYDROCHLORIDE SCH MG: 50 TABLET ORAL at 17:23

## 2018-08-23 RX ADMIN — INSULIN ASPART SCH UNITS: 100 INJECTION, SOLUTION INTRAVENOUS; SUBCUTANEOUS at 11:35

## 2018-08-23 RX ADMIN — DOXAZOSIN MESYLATE SCH MG: 4 TABLET ORAL at 21:00

## 2018-08-23 RX ADMIN — HYDRALAZINE HYDROCHLORIDE SCH MG: 50 TABLET ORAL at 11:32

## 2018-08-23 RX ADMIN — SODIUM CHLORIDE SCH MLS/HR: 0.9 INJECTION INTRAVENOUS at 21:00

## 2018-08-23 RX ADMIN — HYDRALAZINE HYDROCHLORIDE SCH MG: 50 TABLET ORAL at 00:19

## 2018-08-23 RX ADMIN — HEPARIN SODIUM SCH UNITS: 5000 INJECTION INTRAVENOUS; SUBCUTANEOUS at 08:33

## 2018-08-23 RX ADMIN — INSULIN ASPART SCH UNITS: 100 INJECTION, SOLUTION INTRAVENOUS; SUBCUTANEOUS at 21:01

## 2018-08-23 RX ADMIN — HYDRALAZINE HYDROCHLORIDE SCH MG: 50 TABLET ORAL at 05:45

## 2018-08-23 RX ADMIN — METOPROLOL TARTRATE SCH MG: 50 TABLET, FILM COATED ORAL at 21:00

## 2018-08-23 RX ADMIN — HEPARIN SODIUM SCH UNITS: 5000 INJECTION INTRAVENOUS; SUBCUTANEOUS at 21:01

## 2018-08-23 RX ADMIN — INSULIN ASPART SCH UNITS: 100 INJECTION, SOLUTION INTRAVENOUS; SUBCUTANEOUS at 05:46

## 2018-08-23 RX ADMIN — ATORVASTATIN CALCIUM SCH MG: 20 TABLET, FILM COATED ORAL at 20:59

## 2018-08-23 RX ADMIN — BENZTROPINE MESYLATE SCH MG: 1 TABLET ORAL at 08:28

## 2018-08-23 RX ADMIN — METOPROLOL TARTRATE SCH MG: 50 TABLET, FILM COATED ORAL at 08:28

## 2018-08-23 RX ADMIN — INSULIN ASPART SCH UNITS: 100 INJECTION, SOLUTION INTRAVENOUS; SUBCUTANEOUS at 16:32

## 2018-08-23 NOTE — CONSULTATION
DATE OF CONSULTATION:  08/22/2018



NEPHROLOGY CONSULTATION



CONSULTING PHYSICIAN:  Maurice Wall M.D.



REFERRING PHYSICIAN:  Michael Nunez M.D.



REASON FOR CONSULTATION:  Elevated BUN and creatinine and anemia of renal

disease.



HISTORY OF PRESENT ILLNESS:  The patient is a 68-year-old 

male with known chronic kidney disease likely chronic kidney disease stage

5.  He has also chronic congestive heart failure, hypertension, diabetes,

and likely diabetic nephropathy.  He has used nonsteroidal

anti-inflammatories in the past, but not recently.  The patient presents

with some generalized weakness and worsening anemia. He has also had

elevated troponin and non-ST-elevation myocardial infarction.  As per Dr. Munguia possibly set off by his severe anemia. There is no nausea or

vomiting at this time.  He is voiding well and has no urinary retention.



PAST SURGICAL HISTORY:  Cataract interocular lens and ear surgery for

hearing impairment.



MEDICATIONS:  Prior to admission medicines are listed as the following:

Tylenol, amlodipine, atorvastatin, benztropine, Dramamine, doxazosin,

famotidine, Prolixin, glimepiride, Levoxyl, metoprolol, multivitamins, and

ranitidine.



ALLERGIES:  None known.



HABITS:  He smoked in the distant past and quit alcohol. Rare

drugs.



SYSTEM REVIEW:

HEENT:  History of interocular lens and known diabetic retinopathy.

ENDOCRINE:  Diabetes and obesity. Diabetes generally well controlled on

oral agents.

CARDIAC:  No history of palpitations or chest pain, but he has abnormal

troponin. He has hypertension, difficult to control.

GASTROINTESTINAL:  No gastrointestinal bleeding, ulcers, or _____ abdominal

pain.

GENITOURINARY:  He has occasional urinary hesitancy.  No retention.

MUSCULOSKELETAL:  Mild arthralgias.



PHYSICAL EXAMINATION:

GENERAL:  The patient is an alert man, in no acute distress.

VITAL SIGNS:  BMI 33, temperature 97.4, pulse 63, respirations 21, and

blood pressure 163/84.

HEENT:  Sclerae are nonicteric.  Ocular motion intact in all directions.

Oral mucosa moist.

NECK:  No adenopathy or thyroid enlargement.

LUNGS:  Clear.

HEART:  Regular rhythm.  I hear no murmur.

ABDOMEN:  Obese and soft.  No organomegaly or masses.

EXTREMITIES:  Trace edema.

NEUROLOGIC:  The patient is alert and oriented. Cranial nerves are intact.

No focal findings.  There is some mild slowness of speech.



PERTINENT LABORATORY DATA:  White count 10.7 and hemoglobin is 7.8. Sodium

145, potassium 4.2, chloride 112, CO2 18, BUN 39, and creatinine 5.9.

Uric acid 5.6.  Albumin 2.6. TSH 19.4.



IMPRESSION:

1. Chronic kidney disease stage 5.

2. Anemia of renal disease.

3. Acute non-ST-elevation myocardial infarction, possibly anemia

related.

4. Elevated BNP and likely chronic diastolic congestive heart failure.

5. Difficult to control blood pressure.

6. Diabetic nephropathy.

7. Elevated thyroid stimulating hormone.

8. History of psychosis, on psychotropic medicines with good response.



PLAN:  The patient should be maintained on Epogen and iron. There may be

some financial difficulty to get the Epogen as an outpatient and he may

need serial transfusions to keep his hemoglobin. He likely will need

dialysis sometime in the next year, but not urgently. We were considering

placing a dialysis fistula in preparation for future dialysis, but we may

have to defer this due to his acute myocardial infarction. As discussed

with Dr. Munguia, a stress test will be done on this admission, which will

help us evaluate his respectives for doing fistula surgery. Orders have

been reviewed for chronic kidney disease.









  ______________________________________________

  Maurice Wall M.D.





DR:  PRINCE

D:  08/22/2018 17:44

T:  08/23/2018 01:53

JOB#:  [id number]

CC:

## 2018-08-23 NOTE — GENERAL PROGRESS NOTE
Assessment/Plan


Problem List:  


(1) UTI (urinary tract infection)


ICD Codes:  N39.0 - Urinary tract infection, site not specified


SNOMED:  14028076


(2) Malignant hypertension (arteriolar nephrosclerosis)


ICD Codes:  I12.9 - Hypertensive chronic kidney disease with stage 1 through 

stage 4 chronic kidney disease, or unspecified chronic kidney disease


SNOMED:  27039428


(3) Diabetes


ICD Codes:  E11.9 - Type 2 diabetes mellitus without complications


SNOMED:  78773524


(4) ESRD (end stage renal disease)


ICD Codes:  N18.6 - End stage renal disease


SNOMED:  47053519, 526805300, 808418448


(5) Elevated troponin


ICD Codes:  R74.8 - Abnormal levels of other serum enzymes


SNOMED:  034044916, 336199305, 596436459


(6) Anemia in chronic kidney disease


ICD Codes:  N18.9 - Chronic kidney disease, unspecified; D63.1 - Anemia in 

chronic kidney disease


SNOMED:  292613133, 789123961


(7) CKD (chronic kidney disease) stage 5, GFR less than 15 ml/min


ICD Codes:  N18.5 - Chronic kidney disease, stage 5


SNOMED:  889065113


Status:  stable, progressing


Assessment/Plan


cont current rx


epo/iron


check duplex


possible HD initiation


o2


cards follow up





Subjective


ROS Limited/Unobtainable:  No


Constitutional:  Reports: malaise, weakness


HEENT:  Reports: no symptoms


Cardiovascular:  Reports: no symptoms


Respiratory:  Reports: SOB with excertion


Gastrointestinal/Abdominal:  Reports: no symptoms


Genitourinary:  Reports: no symptoms


Neurologic/Psychiatric:  Reports: no symptoms


Endocrine:  Reports: no symptoms


Hematologic/Lymphatic:  Reports: no symptoms


Allergies:  


Coded Allergies:  


     No Known Allergies (Unverified , 9/25/17)


All Systems:  reviewed and negative except above


Subjective


no new complaints. noted with FIELD. urine collection in process. no cp





Objective





Last 24 Hour Vital Signs








  Date Time  Temp Pulse Resp B/P (MAP) Pulse Ox O2 Delivery O2 Flow Rate FiO2


 


8/23/18 08:28  67  153/89    


 


8/23/18 08:28  67  153/89    


 


8/23/18 08:00 97.3 67 20 153/89 (110) 97   





 97.3       


 


8/23/18 05:45    153/81    


 


8/23/18 04:00  60      


 


8/23/18 04:00 97.3 62 25 148/83 (104) 97   





 97.3       


 


8/23/18 00:19    151/91    


 


8/23/18 00:00 97.0 68 21 151/91 (111) 95   





 97.0       


 


8/23/18 00:00  66      


 


8/22/18 22:20  68  146/84    


 


8/22/18 21:00      Nasal Cannula 2.0 


 


8/22/18 20:00 97.0 68 24 146/84 (104) 95   





 97.0       


 


8/22/18 20:00  66      


 


8/22/18 18:56    163/84    


 


8/22/18 18:56  60  163/84    


 


8/22/18 16:00 97.4 63 21 163/84 (110) 95   





 97.4       


 


8/22/18 16:00  60      


 


8/22/18 12:00  75      


 


8/22/18 12:00 97.5 62 22 179/89 (119) 94   





 97.5       


 


8/22/18 09:33  97  161/102    


 


8/22/18 09:33  97  161/102    


 


8/22/18 09:00      Room Air  

















Intake and Output  


 


 8/22/18 8/23/18





 19:00 07:00


 


Intake Total 240 ml 300 ml


 


Output Total  600 ml


 


Balance 240 ml -300 ml


 


  


 


Intake Oral 240 ml 240 ml


 


IV Total  60 ml


 


Output Urine Total  600 ml


 


# Voids  2








Height (Feet):  6


Height (Inches):  2.00


Weight (Pounds):  257


General Appearance:  WD/WN, alert


Neck:  supple


Cardiovascular:  normal rate


Respiratory/Chest:  chest wall non-tender, lungs clear, normal breath sounds


Abdomen:  normal bowel sounds, non tender, soft, no organomegaly


Neurologic:  CNs II-XII grossly normal, alert, oriented x 3, responsive











Michael Nunez MD Aug 23, 2018 08:58

## 2018-08-23 NOTE — NEPHROLOGY PROGRESS NOTE
Assessment/Plan


Problem List:  


(1) Nephrotic syndrome


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


(3) Malignant hypertension (arteriolar nephrosclerosis)


(4) Anemia in chronic kidney disease


(5) CKD (chronic kidney disease) stage 5, GFR less than 15 ml/min


Plan


creat clear 8, 24 hr protein 7.9g heavy  continue bp and cardiac med titration, 

epogen, iron.  Possible av fistula in near future





Subjective


Constitutional:  Reports: weakness


HEENT:  Reports: no symptoms


Genitourinary:  Reports: no symptoms


Neurologic/Psychiatric:  Reports: no symptoms





Objective


Objective





Last 24 Hour Vital Signs








  Date Time  Temp Pulse Resp B/P (MAP) Pulse Ox O2 Delivery O2 Flow Rate FiO2


 


8/23/18 16:00  62      


 


8/23/18 15:43 96.8 65 22 146/90 (108) 96   





 96.8       


 


8/23/18 12:00  48      


 


8/23/18 12:00 97.3 67 22 143/81 (101) 95   





 97.3       


 


8/23/18 11:32    151/81    


 


8/23/18 09:00      Nasal Cannula 2.0 


 


8/23/18 08:28  67  153/89    


 


8/23/18 08:28  67  153/89    


 


8/23/18 08:00 97.3 67 20 153/89 (110) 97   





 97.3       


 


8/23/18 08:00  76      


 


8/23/18 05:45    153/81    


 


8/23/18 04:00  60      


 


8/23/18 04:00 97.3 62 25 148/83 (104) 97   





 97.3       


 


8/23/18 00:19    151/91    


 


8/23/18 00:00 97.0 68 21 151/91 (111) 95   





 97.0       


 


8/23/18 00:00  66      


 


8/22/18 22:20  68  146/84    


 


8/22/18 21:00      Nasal Cannula 2.0 


 


8/22/18 20:00 97.0 68 24 146/84 (104) 95   





 97.0       


 


8/22/18 20:00  66      


 


8/22/18 18:56    163/84    


 


8/22/18 18:56  60  163/84    

















Intake and Output  


 


 8/22/18 8/23/18





 19:00 07:00


 


Intake Total 240 ml 300 ml


 


Output Total  600 ml


 


Balance 240 ml -300 ml


 


  


 


Intake Oral 240 ml 240 ml


 


IV Total  60 ml


 


Output Urine Total  600 ml


 


# Voids  2








Height (Feet):  6


Height (Inches):  2.00


Weight (Pounds):  257


General Appearance:  no apparent distress, alert


EENT:  normal ENT inspection


Neck:  non-tender, normal alignment


Cardiovascular:  normal rate, regular rhythm


Respiratory/Chest:  lungs clear


Abdomen:  non tender, soft


Extremities:  trace edema


Neurologic:  CNs II-XII grossly normal











MACKENZIE MAY Aug 23, 2018 16:58

## 2018-08-24 VITALS — DIASTOLIC BLOOD PRESSURE: 77 MMHG | SYSTOLIC BLOOD PRESSURE: 144 MMHG

## 2018-08-24 VITALS — DIASTOLIC BLOOD PRESSURE: 70 MMHG | SYSTOLIC BLOOD PRESSURE: 140 MMHG

## 2018-08-24 VITALS — SYSTOLIC BLOOD PRESSURE: 136 MMHG | DIASTOLIC BLOOD PRESSURE: 63 MMHG

## 2018-08-24 VITALS — DIASTOLIC BLOOD PRESSURE: 67 MMHG | SYSTOLIC BLOOD PRESSURE: 140 MMHG

## 2018-08-24 VITALS — DIASTOLIC BLOOD PRESSURE: 82 MMHG | SYSTOLIC BLOOD PRESSURE: 141 MMHG

## 2018-08-24 VITALS — SYSTOLIC BLOOD PRESSURE: 153 MMHG | DIASTOLIC BLOOD PRESSURE: 90 MMHG

## 2018-08-24 LAB
ADD MANUAL DIFF: YES
ALBUMIN SERPL-MCNC: 2.7 G/DL (ref 3.4–5)
ALBUMIN/GLOB SERPL: 0.7 {RATIO} (ref 1–2.7)
ALP SERPL-CCNC: 57 U/L (ref 46–116)
ALT SERPL-CCNC: 24 U/L (ref 12–78)
ANION GAP SERPL CALC-SCNC: 14 MMOL/L (ref 5–15)
AST SERPL-CCNC: 12 U/L (ref 15–37)
BILIRUB SERPL-MCNC: 0.2 MG/DL (ref 0.2–1)
BUN SERPL-MCNC: 38 MG/DL (ref 7–18)
CALCIUM SERPL-MCNC: 7.9 MG/DL (ref 8.5–10.1)
CHLORIDE SERPL-SCNC: 110 MMOL/L (ref 98–107)
CO2 SERPL-SCNC: 17 MMOL/L (ref 21–32)
CREAT SERPL-MCNC: 5.4 MG/DL (ref 0.55–1.3)
ERYTHROCYTE [DISTWIDTH] IN BLOOD BY AUTOMATED COUNT: 16.2 % (ref 11.6–14.8)
GLOBULIN SER-MCNC: 3.7 G/DL
HCT VFR BLD CALC: 23.7 % (ref 42–52)
HGB BLD-MCNC: 7.9 G/DL (ref 14.2–18)
MCV RBC AUTO: 91 FL (ref 80–99)
PLATELET # BLD: 269 K/UL (ref 150–450)
POTASSIUM SERPL-SCNC: 4.1 MMOL/L (ref 3.5–5.1)
RBC # BLD AUTO: 2.61 M/UL (ref 4.7–6.1)
SODIUM SERPL-SCNC: 141 MMOL/L (ref 136–145)
WBC # BLD AUTO: 11.2 K/UL (ref 4.8–10.8)

## 2018-08-24 RX ADMIN — HYDRALAZINE HYDROCHLORIDE SCH MG: 50 TABLET ORAL at 02:26

## 2018-08-24 RX ADMIN — INSULIN ASPART SCH UNITS: 100 INJECTION, SOLUTION INTRAVENOUS; SUBCUTANEOUS at 05:59

## 2018-08-24 RX ADMIN — METOPROLOL TARTRATE SCH MG: 50 TABLET, FILM COATED ORAL at 08:23

## 2018-08-24 RX ADMIN — ERYTHROPOIETIN SCH UNITS: 20000 INJECTION, SOLUTION INTRAVENOUS; SUBCUTANEOUS at 21:00

## 2018-08-24 RX ADMIN — METOPROLOL TARTRATE SCH MG: 50 TABLET, FILM COATED ORAL at 21:00

## 2018-08-24 RX ADMIN — INSULIN ASPART SCH UNITS: 100 INJECTION, SOLUTION INTRAVENOUS; SUBCUTANEOUS at 17:13

## 2018-08-24 RX ADMIN — HYDRALAZINE HYDROCHLORIDE SCH MG: 50 TABLET ORAL at 09:15

## 2018-08-24 RX ADMIN — HYDRALAZINE HYDROCHLORIDE SCH MG: 50 TABLET ORAL at 17:11

## 2018-08-24 RX ADMIN — ATORVASTATIN CALCIUM SCH MG: 20 TABLET, FILM COATED ORAL at 21:00

## 2018-08-24 RX ADMIN — INSULIN ASPART SCH UNITS: 100 INJECTION, SOLUTION INTRAVENOUS; SUBCUTANEOUS at 11:20

## 2018-08-24 RX ADMIN — ASPIRIN 81 MG SCH MG: 81 TABLET ORAL at 08:23

## 2018-08-24 RX ADMIN — HEPARIN SODIUM SCH UNITS: 5000 INJECTION INTRAVENOUS; SUBCUTANEOUS at 21:00

## 2018-08-24 RX ADMIN — HEPARIN SODIUM SCH UNITS: 5000 INJECTION INTRAVENOUS; SUBCUTANEOUS at 08:25

## 2018-08-24 RX ADMIN — SODIUM CHLORIDE SCH MLS/HR: 0.9 INJECTION INTRAVENOUS at 21:00

## 2018-08-24 RX ADMIN — BENZTROPINE MESYLATE SCH MG: 1 TABLET ORAL at 08:23

## 2018-08-24 RX ADMIN — DOXAZOSIN MESYLATE SCH MG: 4 TABLET ORAL at 21:00

## 2018-08-24 RX ADMIN — DOXAZOSIN MESYLATE SCH MG: 4 TABLET ORAL at 08:23

## 2018-08-24 RX ADMIN — INSULIN ASPART SCH UNITS: 100 INJECTION, SOLUTION INTRAVENOUS; SUBCUTANEOUS at 21:00

## 2018-08-24 NOTE — PROGRESS NOTE
DATE:  08/23/2018



CARDIOLOGY PROGRESS NOTE



SUBJECTIVE:  The patient is short of breath at times.  No chest pain.  His

creatinine clearance is less than 10.  A 24-hour urine protein is 8 grams.

Blood pressure parameters are slightly better.



OBJECTIVE:

NECK:  Supple.

LUNGS:  Few rales.

CARDIAC:  Regular rhythm and rate.  Normal S1, S2 with a fourth heart

sound.

EXTREMITIES:  With 1+ edema.



IMPRESSION:

1. Acute myocardial infarction.

2. Hypertensive heart disease with labile blood pressure.

3. Progressive renal failure.

4. Anemia of chronic kidney disease.

5. Acute on chronic diastolic congestive heart failure.

6. Hypothyroidism.

7. Nephrotic syndrome with heavy proteinuria.

8. Cardiogenic insufficiency.



PLAN:

1. Diuresis.

2. Titration of antihypertensives.

3. Myocardial perfusion scan once troponin normalize.

4. Ultimately, will need dialysis access.

5. Continue anti-platelet therapy and beta-blockade.









  ______________________________________________

  Edmar Munguia M.D.





DR:  KALLIE

D:  08/24/2018 03:13

T:  08/24/2018 04:37

JOB#:  [id number]

CC:



CHHAYA

## 2018-08-24 NOTE — PROGRESS NOTE
DATE:  08/22/2018



CARDIOLOGY PROGRESS NOTE



SUBJECTIVE:  The patient has no chest pain.  Less shortness of breath, but

still gets short of breath walking to the bathroom.



OBJECTIVE:

VITAL SIGNS:  Blood pressure 153/89, pulse 67, respiratory rate 20.

LUNGS:  Few rales.

CARDIAC:  Regular rhythm and rate.  Normal S1 and S2 with a fourth heart

sound.

ABDOMEN:  Soft.

EXTREMITIES:  No edema.



LABORATORY AND DIAGNOSTIC DATA:  White count 10.7 and hemoglobin 7.8.

Troponin down to 0.854.  Pro-natriuretic peptide down to 11,000.  TSH is

19.



IMPRESSION:

1. Acute myocardial infarction with cardiogenic insufficiency.

2. Acute on chronic diastolic congestive heart failure.

3. Chronic kidney disease stage 5.

4. Moderate protein calorie malnutrition.

5. Type 2 diabetes mellitus.

6. Hypothyroidism.

7. Anemia of chronic kidney disease.

8. Hypertensive heart disease.



PLAN:

1. Advance antihypertensive and anti-failure regimen.

2. Diuresis.

3. Transfuse for continued hemoglobin below 7.5.

4. Urine collection for creatinine clearance ongoing.

5. IV iron and Epogen.

6. Increase thyroid replacement.









  ______________________________________________

  Edmar Munguia M.D.





DR:  ALEXSANDRA

D:  08/24/2018 03:05

T:  08/24/2018 04:49

JOB#:  [id number]

CC:



CHHAYA

## 2018-08-24 NOTE — PROGRESS NOTE
DATE:  08/24/2018



CARDIOLOGY PROGRESS NOTE



SUBJECTIVE:  The patient has less shortness of breath, but still feels some

fatigue even walking to the bathroom.  He has definitely improved from

yesterday and he denies chest pain.



OBJECTIVE:

VITAL SIGNS:  Blood pressure 153/90, pulse 70, and respirations 18.

LUNGS:  Clear.

CARDIAC:  Regular.  Normal S1, S2 with a fourth heart sound.

ABDOMEN:  Soft.

EXTREMITIES:  Trace dependent edema.



LABORATORY DATA:  White count 11.3 and hemoglobin 7.9.  Potassium 4.1, BUN

38, and creatinine 5.4.  Troponin down to 0.499.  Albumin 2.7.



IMPRESSION:

1. Acute myocardial infarction.

2. Acute on chronic renal failure.

3. Anemia due to chronic kidney disease.

4. Hypothyroidism.

5. Moderate protein-calorie malnutrition.

6. Hypertensive heart disease with labile blood pressure.



PLAN:

1. Optimize anti-failure and antihypertensive regimen.

2. Diuresis based on clinical parameters.

3. Thyroid replacement was increased.

4. Continued anti-platelet therapy, anti-lipid drugs, and

beta-blockade.

5. We will need to obtain noninvasive assessment of coronary flow

reserve and consider placement subsequently of permanent dialysis

access.









  ______________________________________________

  Edmar Munguia M.D.





DR:  KALLIE

D:  08/24/2018 12:23

T:  08/24/2018 22:31

JOB#:  [id number]

CC:

## 2018-08-24 NOTE — NEPHROLOGY PROGRESS NOTE
Assessment/Plan


Problem List:  


(1) Nephrotic syndrome


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


(3) Malignant hypertension (arteriolar nephrosclerosis)


(4) Anemia in chronic kidney disease


(5) CKD (chronic kidney disease) stage 5, GFR less than 15 ml/min


Plan


creat clear 8, 24 hr protein 7.9g heavy  continue bp and cardiac med titration, 

epogen, iron.  Possible av fistula in near future





Subjective


Constitutional:  Reports: weakness


HEENT:  Reports: no symptoms


Genitourinary:  Reports: no symptoms


Neurologic/Psychiatric:  Reports: no symptoms





Objective


Objective





Last 24 Hour Vital Signs








  Date Time  Temp Pulse Resp B/P (MAP) Pulse Ox O2 Delivery O2 Flow Rate FiO2


 


8/24/18 09:15    153/90    


 


8/24/18 08:36      Room Air  


 


8/24/18 08:23  70  153/90    


 


8/24/18 08:23  70  153/90    


 


8/24/18 08:01  69      


 


8/24/18 07:42 98.8 70 18 153/90 (111) 95   





 98.8       


 


8/24/18 04:00  66      


 


8/24/18 04:00 98.8 65 18 140/67 (91) 95   





 98.8       


 


8/24/18 02:26    142/80    


 


8/24/18 00:00  57      


 


8/24/18 00:00 98.2 59 17 136/63 (87) 93   





 98.2       


 


8/23/18 21:00  66      


 


8/23/18 21:00      Nasal Cannula 2.0 


 


8/23/18 21:00  65  137/73    


 


8/23/18 20:00 97.9 65 18 137/73 (94) 93   





 97.9       


 


8/23/18 17:23    146/90    


 


8/23/18 17:23  62  146/90    


 


8/23/18 16:00  62      


 


8/23/18 15:43 96.8 65 22 146/90 (108) 96   





 96.8       

















Intake and Output  


 


 8/23/18 8/24/18





 19:00 07:00


 


Intake Total 500 ml 335 ml


 


Balance 500 ml 335 ml


 


  


 


Intake Oral 500 ml 275 ml


 


IV Total  60 ml


 


# Voids 1 2


 


# Bowel Movements  2








Laboratory Tests


8/24/18 06:45: 


White Blood Count 11.2H, Red Blood Count 2.61L, Hemoglobin 7.9L, Hematocrit 

23.7L, Mean Corpuscular Volume 91, Mean Corpuscular Hemoglobin 30.4, Mean 

Corpuscular Hemoglobin Concent 33.4, Red Cell Distribution Width 16.2H, 

Platelet Count 269, Mean Platelet Volume 6.6, Neutrophils (%) (Auto) , 

Lymphocytes (%) (Auto) , Monocytes (%) (Auto) , Eosinophils (%) (Auto) , 

Basophils (%) (Auto) , Differential Total Cells Counted 100, Neutrophils % (

Manual) 71, Lymphocytes % (Manual) 18L, Monocytes % (Manual) 7, Eosinophils % (

Manual) 2, Basophils % (Manual) 2, Band Neutrophils 0, Platelet Estimate 

Adequate, Platelet Morphology Normal, Hypochromasia 3+, Anisocytosis 1+, 

Spherocytes 1+, Sodium Level 141, Potassium Level 4.1, Chloride Level 110H, 

Carbon Dioxide Level 17L, Anion Gap 14, Blood Urea Nitrogen 38H, Creatinine 5.4H

, Estimat Glomerular Filtration Rate 12.8, Glucose Level 120H, Calcium Level 

7.9L, Total Bilirubin 0.2, Aspartate Amino Transf (AST/SGOT) 12L, Alanine 

Aminotransferase (ALT/SGPT) 24, Alkaline Phosphatase 57, Troponin I 0.499H, 

Total Protein 6.4, Albumin 2.7L, Globulin 3.7, Albumin/Globulin Ratio 0.7L


Height (Feet):  6


Height (Inches):  2.00


Weight (Pounds):  257


General Appearance:  no apparent distress, alert


EENT:  normal ENT inspection


Neck:  normal alignment


Cardiovascular:  normal rate


Respiratory/Chest:  lungs clear


Abdomen:  non tender, soft


Extremities:  moderate edema











MACKENZIE MAY Aug 24, 2018 13:00

## 2018-08-25 VITALS — DIASTOLIC BLOOD PRESSURE: 86 MMHG | SYSTOLIC BLOOD PRESSURE: 148 MMHG

## 2018-08-25 VITALS — DIASTOLIC BLOOD PRESSURE: 85 MMHG | SYSTOLIC BLOOD PRESSURE: 145 MMHG

## 2018-08-25 VITALS — SYSTOLIC BLOOD PRESSURE: 144 MMHG | DIASTOLIC BLOOD PRESSURE: 80 MMHG

## 2018-08-25 VITALS — DIASTOLIC BLOOD PRESSURE: 78 MMHG | SYSTOLIC BLOOD PRESSURE: 136 MMHG

## 2018-08-25 VITALS — SYSTOLIC BLOOD PRESSURE: 152 MMHG | DIASTOLIC BLOOD PRESSURE: 72 MMHG

## 2018-08-25 VITALS — SYSTOLIC BLOOD PRESSURE: 150 MMHG | DIASTOLIC BLOOD PRESSURE: 90 MMHG

## 2018-08-25 VITALS — SYSTOLIC BLOOD PRESSURE: 137 MMHG | DIASTOLIC BLOOD PRESSURE: 72 MMHG

## 2018-08-25 VITALS — SYSTOLIC BLOOD PRESSURE: 151 MMHG | DIASTOLIC BLOOD PRESSURE: 82 MMHG

## 2018-08-25 LAB
ADD MANUAL DIFF: YES
ALBUMIN SERPL-MCNC: 2.7 G/DL (ref 3.4–5)
ALBUMIN/GLOB SERPL: 0.7 {RATIO} (ref 1–2.7)
ALP SERPL-CCNC: 58 U/L (ref 46–116)
ALT SERPL-CCNC: 24 U/L (ref 12–78)
ANION GAP SERPL CALC-SCNC: 16 MMOL/L (ref 5–15)
AST SERPL-CCNC: 14 U/L (ref 15–37)
BILIRUB SERPL-MCNC: 0.2 MG/DL (ref 0.2–1)
BUN SERPL-MCNC: 42 MG/DL (ref 7–18)
CALCIUM SERPL-MCNC: 7.8 MG/DL (ref 8.5–10.1)
CHLORIDE SERPL-SCNC: 109 MMOL/L (ref 98–107)
CO2 SERPL-SCNC: 15 MMOL/L (ref 21–32)
CREAT SERPL-MCNC: 6.6 MG/DL (ref 0.55–1.3)
ERYTHROCYTE [DISTWIDTH] IN BLOOD BY AUTOMATED COUNT: 16.2 % (ref 11.6–14.8)
GLOBULIN SER-MCNC: 3.8 G/DL
HCT VFR BLD CALC: 23.3 % (ref 42–52)
HGB BLD-MCNC: 7.6 G/DL (ref 14.2–18)
MCV RBC AUTO: 92 FL (ref 80–99)
PLATELET # BLD: 254 K/UL (ref 150–450)
POTASSIUM SERPL-SCNC: 4.4 MMOL/L (ref 3.5–5.1)
RBC # BLD AUTO: 2.52 M/UL (ref 4.7–6.1)
SODIUM SERPL-SCNC: 140 MMOL/L (ref 136–145)
WBC # BLD AUTO: 12.1 K/UL (ref 4.8–10.8)

## 2018-08-25 RX ADMIN — SODIUM CHLORIDE SCH MLS/HR: 0.9 INJECTION INTRAVENOUS at 20:37

## 2018-08-25 RX ADMIN — INSULIN ASPART SCH UNITS: 100 INJECTION, SOLUTION INTRAVENOUS; SUBCUTANEOUS at 21:00

## 2018-08-25 RX ADMIN — INSULIN ASPART SCH UNITS: 100 INJECTION, SOLUTION INTRAVENOUS; SUBCUTANEOUS at 11:34

## 2018-08-25 RX ADMIN — HYDRALAZINE HYDROCHLORIDE SCH MG: 50 TABLET ORAL at 02:00

## 2018-08-25 RX ADMIN — HYDRALAZINE HYDROCHLORIDE SCH MG: 50 TABLET ORAL at 17:17

## 2018-08-25 RX ADMIN — HYDRALAZINE HYDROCHLORIDE SCH MG: 50 TABLET ORAL at 10:23

## 2018-08-25 RX ADMIN — ATORVASTATIN CALCIUM SCH MG: 20 TABLET, FILM COATED ORAL at 20:38

## 2018-08-25 RX ADMIN — INSULIN ASPART SCH UNITS: 100 INJECTION, SOLUTION INTRAVENOUS; SUBCUTANEOUS at 06:26

## 2018-08-25 RX ADMIN — ASPIRIN 81 MG SCH MG: 81 TABLET ORAL at 08:37

## 2018-08-25 RX ADMIN — HEPARIN SODIUM SCH UNITS: 5000 INJECTION INTRAVENOUS; SUBCUTANEOUS at 08:35

## 2018-08-25 RX ADMIN — INSULIN ASPART SCH UNITS: 100 INJECTION, SOLUTION INTRAVENOUS; SUBCUTANEOUS at 16:41

## 2018-08-25 RX ADMIN — CLONIDINE HYDROCHLORIDE SCH MG: 0.2 TABLET ORAL at 22:00

## 2018-08-25 RX ADMIN — SODIUM CITRATE AND CITRIC ACID MONOHYDRATE SCH ML: 500; 334 SOLUTION ORAL at 17:55

## 2018-08-25 RX ADMIN — METOPROLOL TARTRATE SCH MG: 50 TABLET, FILM COATED ORAL at 20:51

## 2018-08-25 RX ADMIN — DOXAZOSIN MESYLATE SCH MG: 4 TABLET ORAL at 20:38

## 2018-08-25 RX ADMIN — ISOSORBIDE MONONITRATE SCH MG: 30 TABLET, EXTENDED RELEASE ORAL at 08:37

## 2018-08-25 RX ADMIN — METOPROLOL TARTRATE SCH MG: 50 TABLET, FILM COATED ORAL at 08:37

## 2018-08-25 RX ADMIN — HEPARIN SODIUM SCH UNITS: 5000 INJECTION INTRAVENOUS; SUBCUTANEOUS at 20:59

## 2018-08-25 RX ADMIN — DOXAZOSIN MESYLATE SCH MG: 4 TABLET ORAL at 08:36

## 2018-08-25 RX ADMIN — BENZTROPINE MESYLATE SCH MG: 1 TABLET ORAL at 08:36

## 2018-08-25 NOTE — GENERAL PROGRESS NOTE
Assessment/Plan


Problem List:  


(1) UTI (urinary tract infection)


ICD Codes:  N39.0 - Urinary tract infection, site not specified


SNOMED:  44763934


(2) Malignant hypertension (arteriolar nephrosclerosis)


ICD Codes:  I12.9 - Hypertensive chronic kidney disease with stage 1 through 

stage 4 chronic kidney disease, or unspecified chronic kidney disease


SNOMED:  22511824


(3) Diabetes


ICD Codes:  E11.9 - Type 2 diabetes mellitus without complications


SNOMED:  46264078


(4) ESRD (end stage renal disease)


ICD Codes:  N18.6 - End stage renal disease


SNOMED:  59971618, 882134442, 306121797


(5) Elevated troponin


ICD Codes:  R74.8 - Abnormal levels of other serum enzymes


SNOMED:  889705782, 494302944, 695122659


(6) Anemia in chronic kidney disease


ICD Codes:  N18.9 - Chronic kidney disease, unspecified; D63.1 - Anemia in 

chronic kidney disease


SNOMED:  668188679, 004142652


(7) CKD (chronic kidney disease) stage 5, GFR less than 15 ml/min


ICD Codes:  N18.5 - Chronic kidney disease, stage 5


SNOMED:  147280785


Status:  stable


Assessment/Plan


cont current rx


epo/iron


stool ob


o2


cards follow up


antiplt rx


transfuse 1 unit


stress test monday





Subjective


ROS Limited/Unobtainable:  No


Constitutional:  Reports: weakness


HEENT:  Reports: no symptoms


Cardiovascular:  Reports: no symptoms


Respiratory:  Reports: shortness of breath, SOB with excertion


Gastrointestinal/Abdominal:  Reports: no symptoms


Genitourinary:  Reports: no symptoms


Neurologic/Psychiatric:  Reports: no symptoms


Endocrine:  Reports: no symptoms


Hematologic/Lymphatic:  Reports: anemia


Allergies:  


Coded Allergies:  


     No Known Allergies (Unverified , 9/25/17)


All Systems:  reviewed and negative except above


Subjective


less sob. +[place]. decreased h/h noted. no bleeding.





Objective





Last 24 Hour Vital Signs








  Date Time  Temp Pulse Resp B/P (MAP) Pulse Ox O2 Delivery O2 Flow Rate FiO2


 


8/25/18 04:00 98.2 68 18 137/72 (93) 96   





 98.2       


 


8/25/18 04:00  62      


 


8/25/18 02:00    144/80    


 


8/25/18 00:00  66      


 


8/25/18 00:00 97.9 69 20 144/80 (101) 95   





 97.9       


 


8/24/18 21:00      Nasal Cannula 2.0 


 


8/24/18 21:00  72  140/70    


 


8/24/18 20:00  68      


 


8/24/18 20:00 98.1 65 20 144/77 (99) 98   





 98.1       


 


8/24/18 17:11    140/70    


 


8/24/18 17:11  72  140/70    


 


8/24/18 16:46 98.2 72 18 140/70 (93) 95   





 98.2       


 


8/24/18 15:36  66      


 


8/24/18 14:14 98.2 74 18 141/82 (101) 95   





 98.2       


 


8/24/18 13:36    140/80    


 


8/24/18 11:57  63      


 


8/24/18 09:15    153/90    


 


8/24/18 08:36      Room Air  

















Intake and Output  


 


 8/24/18 8/25/18





 19:00 07:00


 


Intake Total 600 ml 200 ml


 


Balance 600 ml 200 ml


 


  


 


Intake Oral 600 ml 200 ml


 


# Voids 2 


 


# Bowel Movements  1








Laboratory Tests


8/25/18 06:28: 


White Blood Count 12.1H, Red Blood Count 2.52L, Hemoglobin 7.6L, Hematocrit 

23.3L, Mean Corpuscular Volume 92, Mean Corpuscular Hemoglobin 30.1, Mean 

Corpuscular Hemoglobin Concent 32.7, Red Cell Distribution Width 16.2H, 

Platelet Count 254, Mean Platelet Volume 6.7, Neutrophils (%) (Auto) , 

Lymphocytes (%) (Auto) , Monocytes (%) (Auto) , Eosinophils (%) (Auto) , 

Basophils (%) (Auto) , Neutrophils % (Manual) [Pending], Lymphocytes % (Manual) 

[Pending], Platelet Estimate [Pending], Platelet Morphology [Pending], Sodium 

Level 140, Potassium Level 4.4, Chloride Level 109H, Carbon Dioxide Level 15L, 

Anion Gap 16H, Blood Urea Nitrogen 42H, Creatinine 6.6H, Estimat Glomerular 

Filtration Rate 10.2, Glucose Level 120H, Calcium Level 7.8L, Total Bilirubin 

0.2, Aspartate Amino Transf (AST/SGOT) 14L, Alanine Aminotransferase (ALT/SGPT) 

24, Alkaline Phosphatase 58, Pro-B-Type Natriuretic Peptide 34189N, Total 

Protein 6.5, Albumin 2.7L, Globulin 3.8, Albumin/Globulin Ratio 0.7L


Height (Feet):  6


Height (Inches):  2.00


Weight (Pounds):  257


Objective


General Appearance:  WD/WN, alert


Neck:  supple


Cardiovascular:  normal rate


Respiratory/Chest:  chest wall non-tender, lungs clear, normal breath sounds


Abdomen:  normal bowel sounds, non tender, soft, no organomegaly


Neurologic:  CNs II-XII grossly normal, alert, oriented x 3, responsive











Michael Nunez MD Aug 25, 2018 08:24

## 2018-08-25 NOTE — NEPHROLOGY PROGRESS NOTE
Assessment/Plan


Problem List:  


(1) Nephrotic syndrome


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


(3) Malignant hypertension (arteriolar nephrosclerosis)


(4) Anemia in chronic kidney disease


(5) CKD (chronic kidney disease) stage 5, GFR less than 15 ml/min


Plan


creat clear 8, 24 hr protein 7.9g heavy  continue bp and cardiac med titration, 

epogen, iron.  Possible av fistula in near future  transfuse 8/25 lab a bit 

worse;, add bicitra and clonidine for bp





Subjective


Constitutional:  Reports: weakness


HEENT:  Reports: no symptoms


Genitourinary:  Reports: no symptoms


Neurologic/Psychiatric:  Reports: no symptoms





Objective


Objective





Last 24 Hour Vital Signs








  Date Time  Temp Pulse Resp B/P (MAP) Pulse Ox O2 Delivery O2 Flow Rate FiO2


 


8/25/18 17:17    149/83    


 


8/25/18 17:16  63  149/83    


 


8/25/18 16:00 98.2 65 18 148/86 (106) 96   





 98.2       


 


8/25/18 16:00  58      


 


8/25/18 14:42 98.0 61 18 151/82 (105) 96   





 98.0       


 


8/25/18 12:15 97.9 70 18 150/90 (110) 96   





 97.9       


 


8/25/18 12:00 97.9 62 18 145/85 (105) 96   





 97.9       


 


8/25/18 12:00  67      


 


8/25/18 10:23    138/80    


 


8/25/18 09:00      Nasal Cannula 2.0 


 


8/25/18 08:37    152/79    


 


8/25/18 08:37  72  152/79    


 


8/25/18 08:36  72  152/79    


 


8/25/18 08:00  78      


 


8/25/18 08:00 97.9 72 18 152/72 (98) 96   





 97.9       


 


8/25/18 04:00 98.2 68 18 137/72 (93) 96   





 98.2       


 


8/25/18 04:00  62      


 


8/25/18 02:00    144/80    


 


8/25/18 00:00  66      


 


8/25/18 00:00 97.9 69 20 144/80 (101) 95   





 97.9       


 


8/24/18 21:00      Nasal Cannula 2.0 


 


8/24/18 21:00  72  140/70    


 


8/24/18 20:00  68      


 


8/24/18 20:00 98.1 65 20 144/77 (99) 98   





 98.1       

















Intake and Output  


 


 8/24/18 8/25/18





 19:00 07:00


 


Intake Total 600 ml 200 ml


 


Balance 600 ml 200 ml


 


  


 


Intake Oral 600 ml 200 ml


 


# Voids 2 


 


# Bowel Movements  1








Laboratory Tests


8/25/18 06:28: 


White Blood Count 12.1H, Red Blood Count 2.52L, Hemoglobin 7.6L, Hematocrit 

23.3L, Mean Corpuscular Volume 92, Mean Corpuscular Hemoglobin 30.1, Mean 

Corpuscular Hemoglobin Concent 32.7, Red Cell Distribution Width 16.2H, 

Platelet Count 254, Mean Platelet Volume 6.7, Neutrophils (%) (Auto) , 

Lymphocytes (%) (Auto) , Monocytes (%) (Auto) , Eosinophils (%) (Auto) , 

Basophils (%) (Auto) , Differential Total Cells Counted 100, Neutrophils % (

Manual) 73, Lymphocytes % (Manual) 14L, Monocytes % (Manual) 8, Eosinophils % (

Manual) 5H, Basophils % (Manual) 0, Band Neutrophils 0, Platelet Estimate 

Adequate, Platelet Morphology Normal, Red Blood Cell Morphology Normal, Sodium 

Level 140, Potassium Level 4.4, Chloride Level 109H, Carbon Dioxide Level 15L, 

Anion Gap 16H, Blood Urea Nitrogen 42H, Creatinine 6.6H, Estimat Glomerular 

Filtration Rate 10.2, Glucose Level 120H, Calcium Level 7.8L, Total Bilirubin 

0.2, Aspartate Amino Transf (AST/SGOT) 14L, Alanine Aminotransferase (ALT/SGPT) 

24, Alkaline Phosphatase 58, Pro-B-Type Natriuretic Peptide 95587I, Total 

Protein 6.5, Albumin 2.7L, Globulin 3.8, Albumin/Globulin Ratio 0.7L


Height (Feet):  6


Height (Inches):  2.00


Weight (Pounds):  257


General Appearance:  no apparent distress, alert


EENT:  normal ENT inspection


Neck:  non-tender, normal alignment


Cardiovascular:  normal rate, regular rhythm


Respiratory/Chest:  lungs clear


Extremities:  non-tender


Neurologic:  CNs II-XII grossly normal











MACKENZIE MAY Aug 25, 2018 17:26

## 2018-08-26 VITALS — SYSTOLIC BLOOD PRESSURE: 152 MMHG | DIASTOLIC BLOOD PRESSURE: 83 MMHG

## 2018-08-26 VITALS — SYSTOLIC BLOOD PRESSURE: 133 MMHG | DIASTOLIC BLOOD PRESSURE: 69 MMHG

## 2018-08-26 VITALS — DIASTOLIC BLOOD PRESSURE: 80 MMHG | SYSTOLIC BLOOD PRESSURE: 134 MMHG

## 2018-08-26 VITALS — DIASTOLIC BLOOD PRESSURE: 71 MMHG | SYSTOLIC BLOOD PRESSURE: 140 MMHG

## 2018-08-26 VITALS — SYSTOLIC BLOOD PRESSURE: 128 MMHG | DIASTOLIC BLOOD PRESSURE: 79 MMHG

## 2018-08-26 VITALS — DIASTOLIC BLOOD PRESSURE: 75 MMHG | SYSTOLIC BLOOD PRESSURE: 130 MMHG

## 2018-08-26 LAB
ADD MANUAL DIFF: NO
ALBUMIN SERPL-MCNC: 2.5 G/DL (ref 3.4–5)
ALBUMIN/GLOB SERPL: 0.7 {RATIO} (ref 1–2.7)
ALP SERPL-CCNC: 68 U/L (ref 46–116)
ALT SERPL-CCNC: 37 U/L (ref 12–78)
ANION GAP SERPL CALC-SCNC: 13 MMOL/L (ref 5–15)
AST SERPL-CCNC: 19 U/L (ref 15–37)
BASOPHILS NFR BLD AUTO: 0.3 % (ref 0–2)
BILIRUB SERPL-MCNC: 0.2 MG/DL (ref 0.2–1)
BUN SERPL-MCNC: 45 MG/DL (ref 7–18)
CALCIUM SERPL-MCNC: 7.8 MG/DL (ref 8.5–10.1)
CHLORIDE SERPL-SCNC: 109 MMOL/L (ref 98–107)
CO2 SERPL-SCNC: 18 MMOL/L (ref 21–32)
CREAT SERPL-MCNC: 6.4 MG/DL (ref 0.55–1.3)
EOSINOPHIL NFR BLD AUTO: 1.2 % (ref 0–3)
ERYTHROCYTE [DISTWIDTH] IN BLOOD BY AUTOMATED COUNT: 16.8 % (ref 11.6–14.8)
GLOBULIN SER-MCNC: 3.7 G/DL
HCT VFR BLD CALC: 25.1 % (ref 42–52)
HGB BLD-MCNC: 8.1 G/DL (ref 14.2–18)
LYMPHOCYTES NFR BLD AUTO: 11.2 % (ref 20–45)
MCV RBC AUTO: 92 FL (ref 80–99)
MONOCYTES NFR BLD AUTO: 11.2 % (ref 1–10)
NEUTROPHILS NFR BLD AUTO: 76 % (ref 45–75)
PLATELET # BLD: 222 K/UL (ref 150–450)
POTASSIUM SERPL-SCNC: 4.3 MMOL/L (ref 3.5–5.1)
RBC # BLD AUTO: 2.74 M/UL (ref 4.7–6.1)
SODIUM SERPL-SCNC: 140 MMOL/L (ref 136–145)
WBC # BLD AUTO: 10.4 K/UL (ref 4.8–10.8)

## 2018-08-26 RX ADMIN — ATORVASTATIN CALCIUM SCH MG: 20 TABLET, FILM COATED ORAL at 21:59

## 2018-08-26 RX ADMIN — HEPARIN SODIUM SCH UNITS: 5000 INJECTION INTRAVENOUS; SUBCUTANEOUS at 08:33

## 2018-08-26 RX ADMIN — HYDRALAZINE HYDROCHLORIDE SCH MG: 50 TABLET ORAL at 17:49

## 2018-08-26 RX ADMIN — CLONIDINE HYDROCHLORIDE SCH MG: 0.2 TABLET ORAL at 22:00

## 2018-08-26 RX ADMIN — BENZTROPINE MESYLATE SCH MG: 1 TABLET ORAL at 08:35

## 2018-08-26 RX ADMIN — ASPIRIN 81 MG SCH MG: 81 TABLET ORAL at 08:34

## 2018-08-26 RX ADMIN — CLONIDINE HYDROCHLORIDE SCH MG: 0.2 TABLET ORAL at 06:00

## 2018-08-26 RX ADMIN — INSULIN ASPART SCH UNITS: 100 INJECTION, SOLUTION INTRAVENOUS; SUBCUTANEOUS at 06:30

## 2018-08-26 RX ADMIN — SODIUM CITRATE AND CITRIC ACID MONOHYDRATE SCH ML: 500; 334 SOLUTION ORAL at 08:34

## 2018-08-26 RX ADMIN — ISOSORBIDE MONONITRATE SCH MG: 30 TABLET, EXTENDED RELEASE ORAL at 08:34

## 2018-08-26 RX ADMIN — SODIUM CHLORIDE SCH MLS/HR: 0.9 INJECTION INTRAVENOUS at 21:58

## 2018-08-26 RX ADMIN — METOPROLOL TARTRATE SCH MG: 50 TABLET, FILM COATED ORAL at 22:00

## 2018-08-26 RX ADMIN — CLONIDINE HYDROCHLORIDE SCH MG: 0.2 TABLET ORAL at 13:32

## 2018-08-26 RX ADMIN — SODIUM CITRATE AND CITRIC ACID MONOHYDRATE SCH ML: 500; 334 SOLUTION ORAL at 13:32

## 2018-08-26 RX ADMIN — METOPROLOL TARTRATE SCH MG: 50 TABLET, FILM COATED ORAL at 08:35

## 2018-08-26 RX ADMIN — HYDRALAZINE HYDROCHLORIDE SCH MG: 50 TABLET ORAL at 10:05

## 2018-08-26 RX ADMIN — DOXAZOSIN MESYLATE SCH MG: 4 TABLET ORAL at 21:59

## 2018-08-26 RX ADMIN — HEPARIN SODIUM SCH UNITS: 5000 INJECTION INTRAVENOUS; SUBCUTANEOUS at 22:04

## 2018-08-26 RX ADMIN — INSULIN ASPART SCH UNITS: 100 INJECTION, SOLUTION INTRAVENOUS; SUBCUTANEOUS at 16:33

## 2018-08-26 RX ADMIN — DOXAZOSIN MESYLATE SCH MG: 4 TABLET ORAL at 08:35

## 2018-08-26 RX ADMIN — HYDRALAZINE HYDROCHLORIDE SCH MG: 50 TABLET ORAL at 02:00

## 2018-08-26 RX ADMIN — SODIUM CITRATE AND CITRIC ACID MONOHYDRATE SCH ML: 500; 334 SOLUTION ORAL at 17:49

## 2018-08-26 RX ADMIN — INSULIN ASPART SCH UNITS: 100 INJECTION, SOLUTION INTRAVENOUS; SUBCUTANEOUS at 22:05

## 2018-08-26 RX ADMIN — INSULIN ASPART SCH UNITS: 100 INJECTION, SOLUTION INTRAVENOUS; SUBCUTANEOUS at 11:20

## 2018-08-26 NOTE — GENERAL PROGRESS NOTE
Assessment/Plan


Problem List:  


(1) UTI (urinary tract infection)


ICD Codes:  N39.0 - Urinary tract infection, site not specified


SNOMED:  05297730


(2) Malignant hypertension (arteriolar nephrosclerosis)


ICD Codes:  I12.9 - Hypertensive chronic kidney disease with stage 1 through 

stage 4 chronic kidney disease, or unspecified chronic kidney disease


SNOMED:  93783322


(3) Diabetes


ICD Codes:  E11.9 - Type 2 diabetes mellitus without complications


SNOMED:  86940509


(4) ESRD (end stage renal disease)


ICD Codes:  N18.6 - End stage renal disease


SNOMED:  94195603, 576875293, 129188708


(5) Elevated troponin


ICD Codes:  R74.8 - Abnormal levels of other serum enzymes


SNOMED:  742447766, 594249926, 908659354


(6) Anemia in chronic kidney disease


ICD Codes:  N18.9 - Chronic kidney disease, unspecified; D63.1 - Anemia in 

chronic kidney disease


SNOMED:  906243094, 876274627


(7) CKD (chronic kidney disease) stage 5, GFR less than 15 ml/min


ICD Codes:  N18.5 - Chronic kidney disease, stage 5


SNOMED:  441525051


Status:  stable, progressing


Assessment/Plan


cont current rx


epo/iron


stool ob


o2


cards follow up


antiplt rx


transfuse as needed


stress test tomorrow





Subjective


ROS Limited/Unobtainable:  No


Constitutional:  Reports: malaise, weakness


HEENT:  Reports: no symptoms


Cardiovascular:  Reports: no symptoms


Respiratory:  Reports: shortness of breath, SOB with excertion


Gastrointestinal/Abdominal:  Reports: no symptoms


Genitourinary:  Reports: no symptoms


Neurologic/Psychiatric:  Reports: no symptoms


Endocrine:  Reports: no symptoms


Hematologic/Lymphatic:  Reports: anemia


Allergies:  


Coded Allergies:  


     No Known Allergies (Unverified , 9/25/17)


All Systems:  reviewed and negative except above


Subjective


less sob. +[place]. decreased h/h noted. no bleeding. 


s/p 1 unit. feels a little better.





Objective





Last 24 Hour Vital Signs








  Date Time  Temp Pulse Resp B/P (MAP) Pulse Ox O2 Delivery O2 Flow Rate FiO2


 


8/26/18 09:00      Nasal Cannula 2.0 


 


8/26/18 08:35  70  152/83    


 


8/26/18 08:34    152/83    


 


8/26/18 08:34  70  152/83    


 


8/26/18 08:00 97.7 70 20 152/83 (106) 95   





 97.7       


 


8/26/18 08:00  65      


 


8/26/18 06:00    128/79    


 


8/26/18 04:00 98.0 60 19 128/79 (95) 94   





 98.0       


 


8/26/18 04:00  56      


 


8/26/18 02:00    130/75    


 


8/26/18 00:00  61      


 


8/26/18 00:00 99.5 62 19 130/75 (93) 95   





 99.5       


 


8/25/18 23:00     96 Nasal Cannula 2.0 28


 


8/25/18 23:00      Nasal Cannula 2.0 28


 


8/25/18 22:00    136/78    


 


8/25/18 21:00      Nasal Cannula 2.0 


 


8/25/18 20:51  74  136/78    


 


8/25/18 20:00  66      


 


8/25/18 20:00 98.2 74 20 136/78 (97) 98   





 98.2       


 


8/25/18 18:28      Nasal Cannula 2.0 28


 


8/25/18 18:28     95 Nasal Cannula 2.0 28


 


8/25/18 17:17    149/83    


 


8/25/18 17:16  63  149/83    


 


8/25/18 16:00 98.2 65 18 148/86 (106) 96   





 98.2       


 


8/25/18 16:00  58      


 


8/25/18 14:42 98.0 61 18 151/82 (105) 96   





 98.0       


 


8/25/18 12:15 97.9 70 18 150/90 (110) 96   





 97.9       


 


8/25/18 12:00 97.9 62 18 145/85 (105) 96   





 97.9       


 


8/25/18 12:00  67      


 


8/25/18 10:23    138/80    

















Intake and Output  


 


 8/25/18 8/26/18





 19:00 07:00


 


Intake Total 600 ml 480 ml


 


Balance 600 ml 480 ml


 


  


 


Intake Oral 600 ml 480 ml


 


# Voids  2








Height (Feet):  6


Height (Inches):  2.00


Weight (Pounds):  257


Objective


General Appearance:  WD/WN, alert


Neck:  supple


Cardiovascular:  normal rate


Respiratory/Chest:  chest wall non-tender, lungs clear, normal breath sounds


Abdomen:  normal bowel sounds, non tender, soft, no organomegaly


Neurologic:  CNs II-XII grossly normal, alert, oriented x 3, responsive











Michael Nunez MD Aug 26, 2018 09:32

## 2018-08-26 NOTE — NEPHROLOGY PROGRESS NOTE
Assessment/Plan


Problem List:  


(1) Nephrotic syndrome


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


(3) Malignant hypertension (arteriolar nephrosclerosis)


(4) Anemia in chronic kidney disease


(5) CKD (chronic kidney disease) stage 5, GFR less than 15 ml/min


Plan


creat clear 8, 24 hr protein 7.9g heavy  continue bp and cardiac med titration, 

epogen, iron.  Possible av fistula in near future  transfuse 8/25 lab a bit 

worse;, add bicitra and clonidine for bp, mild wheezw check cxr





Subjective


Constitutional:  Reports: weakness


HEENT:  Reports: no symptoms


Neurologic/Psychiatric:  Reports: no symptoms





Objective


Objective





Last 24 Hour Vital Signs








  Date Time  Temp Pulse Resp B/P (MAP) Pulse Ox O2 Delivery O2 Flow Rate FiO2


 


8/26/18 13:32    119/74    


 


8/26/18 12:00 97.4 54 20 140/71 (94) 97   





 97.4       


 


8/26/18 12:00  53      


 


8/26/18 10:05    136/71    


 


8/26/18 09:00      Nasal Cannula 2.0 


 


8/26/18 08:35     96 Nasal Cannula 2.0 28


 


8/26/18 08:35  70  152/83    


 


8/26/18 08:35      Nasal Cannula 2.0 28


 


8/26/18 08:34    152/83    


 


8/26/18 08:34  70  152/83    


 


8/26/18 08:00 97.7 70 20 152/83 (106) 95   





 97.7       


 


8/26/18 08:00  65      


 


8/26/18 06:00    128/79    


 


8/26/18 04:00 98.0 60 19 128/79 (95) 94   





 98.0       


 


8/26/18 04:00  56      


 


8/26/18 02:00    130/75    


 


8/26/18 00:00  61      


 


8/26/18 00:00 99.5 62 19 130/75 (93) 95   





 99.5       


 


8/25/18 23:00     96 Nasal Cannula 2.0 28


 


8/25/18 23:00      Nasal Cannula 2.0 28


 


8/25/18 22:00    136/78    


 


8/25/18 21:00      Nasal Cannula 2.0 


 


8/25/18 20:51  74  136/78    


 


8/25/18 20:00  66      


 


8/25/18 20:00 98.2 74 20 136/78 (97) 98   





 98.2       


 


8/25/18 18:28      Nasal Cannula 2.0 28


 


8/25/18 18:28     95 Nasal Cannula 2.0 28


 


8/25/18 17:17    149/83    


 


8/25/18 17:16  63  149/83    


 


8/25/18 16:00 98.2 65 18 148/86 (106) 96   





 98.2       


 


8/25/18 16:00  58      


 


8/25/18 14:42 98.0 61 18 151/82 (105) 96   





 98.0       

















Intake and Output  


 


 8/25/18 8/26/18





 19:00 07:00


 


Intake Total 600 ml 480 ml


 


Balance 600 ml 480 ml


 


  


 


Intake Oral 600 ml 480 ml


 


# Voids  2








Laboratory Tests


8/26/18 09:40: Stool Occult Blood Negative


8/26/18 09:55: 


White Blood Count 10.4, Red Blood Count 2.74L, Hemoglobin 8.1L, Hematocrit 25.1L

, Mean Corpuscular Volume 92, Mean Corpuscular Hemoglobin 29.7, Mean 

Corpuscular Hemoglobin Concent 32.3, Red Cell Distribution Width 16.8H, 

Platelet Count 222, Mean Platelet Volume 6.4L, Neutrophils (%) (Auto) 76.0H, 

Lymphocytes (%) (Auto) 11.2L, Monocytes (%) (Auto) 11.2H, Eosinophils (%) (Auto

) 1.2, Basophils (%) (Auto) 0.3, Sodium Level 140, Potassium Level 4.3, 

Chloride Level 109H, Carbon Dioxide Level 18L, Anion Gap 13, Blood Urea 

Nitrogen 45H, Creatinine 6.4H, Estimat Glomerular Filtration Rate 10.5, Glucose 

Level 163H, Calcium Level 7.8L, Total Bilirubin 0.2, Aspartate Amino Transf (AST

/SGOT) 19, Alanine Aminotransferase (ALT/SGPT) 37, Alkaline Phosphatase 68, 

Total Protein 6.2L, Albumin 2.5L, Globulin 3.7, Albumin/Globulin Ratio 0.7L


Height (Feet):  6


Height (Inches):  2.00


Weight (Pounds):  257


General Appearance:  no apparent distress, alert


EENT:  normal ENT inspection


Neck:  normal alignment, supple


Cardiovascular:  normal rate, regular rhythm


Respiratory/Chest:  expiratory wheezing, other - faint whz


Abdomen:  non tender, soft


Extremities:  other - no edema


Neurologic:  CNs II-XII grossly normal











MACKENZIE MAY Aug 26, 2018 13:55

## 2018-08-27 VITALS — SYSTOLIC BLOOD PRESSURE: 144 MMHG | DIASTOLIC BLOOD PRESSURE: 78 MMHG

## 2018-08-27 VITALS — SYSTOLIC BLOOD PRESSURE: 151 MMHG | DIASTOLIC BLOOD PRESSURE: 71 MMHG

## 2018-08-27 VITALS — DIASTOLIC BLOOD PRESSURE: 81 MMHG | SYSTOLIC BLOOD PRESSURE: 144 MMHG

## 2018-08-27 VITALS — SYSTOLIC BLOOD PRESSURE: 147 MMHG | DIASTOLIC BLOOD PRESSURE: 89 MMHG

## 2018-08-27 VITALS — SYSTOLIC BLOOD PRESSURE: 138 MMHG | DIASTOLIC BLOOD PRESSURE: 72 MMHG

## 2018-08-27 VITALS — DIASTOLIC BLOOD PRESSURE: 63 MMHG | SYSTOLIC BLOOD PRESSURE: 121 MMHG

## 2018-08-27 VITALS — SYSTOLIC BLOOD PRESSURE: 132 MMHG | DIASTOLIC BLOOD PRESSURE: 80 MMHG

## 2018-08-27 VITALS — DIASTOLIC BLOOD PRESSURE: 75 MMHG | SYSTOLIC BLOOD PRESSURE: 142 MMHG

## 2018-08-27 LAB
ADD MANUAL DIFF: YES
ANION GAP SERPL CALC-SCNC: 13 MMOL/L (ref 5–15)
BUN SERPL-MCNC: 45 MG/DL (ref 7–18)
CALCIUM SERPL-MCNC: 7.9 MG/DL (ref 8.5–10.1)
CHLORIDE SERPL-SCNC: 109 MMOL/L (ref 98–107)
CO2 SERPL-SCNC: 18 MMOL/L (ref 21–32)
CREAT SERPL-MCNC: 6.3 MG/DL (ref 0.55–1.3)
ERYTHROCYTE [DISTWIDTH] IN BLOOD BY AUTOMATED COUNT: 16.4 % (ref 11.6–14.8)
HCT VFR BLD CALC: 22.7 % (ref 42–52)
HGB BLD-MCNC: 7.6 G/DL (ref 14.2–18)
MCV RBC AUTO: 92 FL (ref 80–99)
PLATELET # BLD: 187 K/UL (ref 150–450)
POTASSIUM SERPL-SCNC: 4.3 MMOL/L (ref 3.5–5.1)
RBC # BLD AUTO: 2.45 M/UL (ref 4.7–6.1)
SODIUM SERPL-SCNC: 140 MMOL/L (ref 136–145)
WBC # BLD AUTO: 10.1 K/UL (ref 4.8–10.8)

## 2018-08-27 RX ADMIN — HYDRALAZINE HYDROCHLORIDE SCH MG: 50 TABLET ORAL at 02:00

## 2018-08-27 RX ADMIN — CLONIDINE HYDROCHLORIDE SCH MG: 0.2 TABLET ORAL at 14:49

## 2018-08-27 RX ADMIN — SODIUM CITRATE AND CITRIC ACID MONOHYDRATE SCH ML: 500; 334 SOLUTION ORAL at 08:41

## 2018-08-27 RX ADMIN — ASPIRIN 81 MG SCH MG: 81 TABLET ORAL at 08:41

## 2018-08-27 RX ADMIN — ASPIRIN 81 MG SCH MG: 81 TABLET ORAL at 08:36

## 2018-08-27 RX ADMIN — ISOSORBIDE MONONITRATE SCH MG: 30 TABLET, EXTENDED RELEASE ORAL at 08:36

## 2018-08-27 RX ADMIN — INSULIN ASPART SCH UNITS: 100 INJECTION, SOLUTION INTRAVENOUS; SUBCUTANEOUS at 16:14

## 2018-08-27 RX ADMIN — DOXAZOSIN MESYLATE SCH MG: 4 TABLET ORAL at 21:29

## 2018-08-27 RX ADMIN — SODIUM CITRATE AND CITRIC ACID MONOHYDRATE SCH ML: 500; 334 SOLUTION ORAL at 17:19

## 2018-08-27 RX ADMIN — INSULIN ASPART SCH UNITS: 100 INJECTION, SOLUTION INTRAVENOUS; SUBCUTANEOUS at 21:36

## 2018-08-27 RX ADMIN — METOPROLOL TARTRATE SCH MG: 50 TABLET, FILM COATED ORAL at 08:40

## 2018-08-27 RX ADMIN — CLONIDINE HYDROCHLORIDE SCH MG: 0.2 TABLET ORAL at 06:00

## 2018-08-27 RX ADMIN — HEPARIN SODIUM SCH UNITS: 5000 INJECTION INTRAVENOUS; SUBCUTANEOUS at 08:40

## 2018-08-27 RX ADMIN — BENZTROPINE MESYLATE SCH MG: 1 TABLET ORAL at 08:36

## 2018-08-27 RX ADMIN — ERYTHROPOIETIN SCH UNITS: 20000 INJECTION, SOLUTION INTRAVENOUS; SUBCUTANEOUS at 21:28

## 2018-08-27 RX ADMIN — INSULIN ASPART SCH UNITS: 100 INJECTION, SOLUTION INTRAVENOUS; SUBCUTANEOUS at 06:13

## 2018-08-27 RX ADMIN — DOXAZOSIN MESYLATE SCH MG: 4 TABLET ORAL at 08:36

## 2018-08-27 RX ADMIN — ATORVASTATIN CALCIUM SCH MG: 20 TABLET, FILM COATED ORAL at 21:29

## 2018-08-27 RX ADMIN — HYDRALAZINE HYDROCHLORIDE SCH MG: 50 TABLET ORAL at 17:18

## 2018-08-27 RX ADMIN — SODIUM CITRATE AND CITRIC ACID MONOHYDRATE SCH ML: 500; 334 SOLUTION ORAL at 12:14

## 2018-08-27 RX ADMIN — HYDRALAZINE HYDROCHLORIDE SCH MG: 50 TABLET ORAL at 10:09

## 2018-08-27 RX ADMIN — METOPROLOL TARTRATE SCH MG: 50 TABLET, FILM COATED ORAL at 21:00

## 2018-08-27 RX ADMIN — INSULIN ASPART SCH UNITS: 100 INJECTION, SOLUTION INTRAVENOUS; SUBCUTANEOUS at 11:28

## 2018-08-27 RX ADMIN — HEPARIN SODIUM SCH UNITS: 5000 INJECTION INTRAVENOUS; SUBCUTANEOUS at 21:00

## 2018-08-27 NOTE — DIAGNOSTIC IMAGING REPORT
Indication: Dyspnea

 

Comparison:  8/21/2018

 

A single view chest radiograph was obtained.

 

Findings:

 

Pulmonary edema is present has developed since the last study. Heart is mildly

enlarged. Vascular prominence, ill-definition of vascular margins and cephalization

noted with interstitial densities.

 

IMPRESSION:

 

Congestive heart failure

## 2018-08-27 NOTE — NEPHROLOGY PROGRESS NOTE
Assessment/Plan


Problem List:  


(1) Nephrotic syndrome


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


(3) Malignant hypertension (arteriolar nephrosclerosis)


(4) Anemia in chronic kidney disease


(5) CKD (chronic kidney disease) stage 5, GFR less than 15 ml/min


(6) CHF (congestive heart failure)


Plan


creat clear 8, 24 hr protein 7.9g heavy  continue bp and cardiac med titration, 

epogen, iron.  Possible av fistula in near future  transfuse 8/25 lab a bit 

worse;, add bicitra and clonidine for bp, mild wheezw check cxr


mild chf, lasix started, may need dialysis soon





Subjective


Constitutional:  Reports: weakness


HEENT:  Reports: no symptoms


Genitourinary:  Reports: no symptoms


Neurologic/Psychiatric:  Reports: no symptoms





Objective


Objective





Last 24 Hour Vital Signs








  Date Time  Temp Pulse Resp B/P (MAP) Pulse Ox O2 Delivery O2 Flow Rate FiO2


 


8/27/18 17:18    151/71    


 


8/27/18 17:18  65  151/71    


 


8/27/18 16:00  49      


 


8/27/18 16:00 97.5 65 20 151/71 (97) 96   





 97.5       


 


8/27/18 14:49    138/72    


 


8/27/18 14:30 97.7 55 20 138/72 (94) 96   





 97.7       


 


8/27/18 12:18 97.8 72 20 147/89 (108) 95   





 97.8       


 


8/27/18 12:03 97.7 58 20 144/81 (102) 95   





 97.7       


 


8/27/18 12:00  62      


 


8/27/18 10:09    157/98    


 


8/27/18 09:00      Nasal Cannula 2.0 


 


8/27/18 08:40  56  142/75    


 


8/27/18 08:36    142/75    


 


8/27/18 08:35  56  142/75    


 


8/27/18 08:00  50      


 


8/27/18 08:00 97.0 56 20 142/75 (97) 95   





 97.0       


 


8/27/18 06:00    121/63    


 


8/27/18 04:00 97.9 56 19 121/63 (82) 95   





 97.9       


 


8/27/18 04:00  65      


 


8/27/18 02:00    114/78    


 


8/27/18 00:00 98.9 61 20 144/78 (100) 95   





 98.9       


 


8/27/18 00:00  67      


 


8/26/18 22:00    134/80    


 


8/26/18 22:00  64  134/80    


 


8/26/18 21:00      Nasal Cannula 2.0 


 


8/26/18 20:00     95 Nasal Cannula 2.0 28


 


8/26/18 20:00  54      


 


8/26/18 20:00 97.9 64 18 134/80 (98) 95   





 97.9       


 


8/26/18 20:00      Nasal Cannula 2.0 28

















Intake and Output  


 


 8/26/18 8/27/18





 19:00 07:00


 


Intake Total 480 ml 


 


Balance 480 ml 


 


  


 


Intake Oral 480 ml 


 


# Voids 2 3


 


# Bowel Movements 1 1








Laboratory Tests


8/27/18 05:40: 


White Blood Count 10.1, Red Blood Count 2.45L, Hemoglobin 7.6L, Hematocrit 22.7L

, Mean Corpuscular Volume 92, Mean Corpuscular Hemoglobin 31.1H, Mean 

Corpuscular Hemoglobin Concent 33.6, Red Cell Distribution Width 16.4H, 

Platelet Count 187, Mean Platelet Volume 6.7, Neutrophils (%) (Auto) , 

Lymphocytes (%) (Auto) , Monocytes (%) (Auto) , Eosinophils (%) (Auto) , 

Basophils (%) (Auto) , Differential Total Cells Counted 100, Neutrophils % (

Manual) 81H, Lymphocytes % (Manual) 9L, Monocytes % (Manual) 9, Eosinophils % (

Manual) 1, Basophils % (Manual) 0, Band Neutrophils 0, Platelet Estimate 

Adequate, Platelet Morphology Normal, Polychromasia Occasional, Anisocytosis 1+

, Sodium Level 140, Potassium Level 4.3, Chloride Level 109H, Carbon Dioxide 

Level 18L, Anion Gap 13, Blood Urea Nitrogen 45H, Creatinine 6.3H, Estimat 

Glomerular Filtration Rate 10.7, Glucose Level 128H, Calcium Level 7.9L, 

Troponin I 0.258H, Pro-B-Type Natriuretic Peptide 36679T


Height (Feet):  6


Height (Inches):  2.00


Weight (Pounds):  257


General Appearance:  no apparent distress, alert


EENT:  normal ENT inspection


Neck:  non-tender, normal alignment


Cardiovascular:  regular rhythm


Respiratory/Chest:  expiratory wheezing, other - mild


Abdomen:  non tender, soft


Extremities:  other - no edema


Neurologic:  CNs II-XII grossly normal











MACKENZIE MAY Aug 27, 2018 19:11

## 2018-08-27 NOTE — PROGRESS NOTE
DATE:  08/26/2018



CARDIOLOGY PROGRESS NOTE



SUBJECTIVE:  The patient is less short of breath.  He received a unit of

packed red blood cells yesterday.  He has no chest pain.



OBJECTIVE:

VITAL SIGNS:  Blood pressure of 141/69, pulse 60, respirations 18,

afebrile, and oxygen saturation on two liters 95%.

LUNGS:  Few rales.

HEART:  Regular rhythm and rate.  Normal S1, S2.

ABDOMEN:  Soft.

EXTREMITIES:  No edema.



LABORATORY DATA:  White count 10, hemoglobin 8.1.  BUN 45, creatinine 6.4,

and potassium 4.3.  Albumin 2.5.



IMPRESSION:

1. Anemia.

2. Acute myocardial infarction.

3. Chronic kidney disease stage 5.

4. Type 2 diabetes mellitus.

5. Acute on chronic diastolic congestive heart failure.



PLAN:

1. Recheck laboratory studies.

2. Transfuse for further drop in hemoglobin with concomitant diuresis.

3. Titrate anti-failure regimen.

4. If hemoglobin stable and troponins have normalized, we will pursue

noninvasive assessment of coronary flow reserve.









  ______________________________________________

  Edmar Munguia M.D.





DR:  MAY

D:  08/27/2018 02:46

T:  08/27/2018 04:36

JOB#:  [id number]

CC:

## 2018-08-28 VITALS — DIASTOLIC BLOOD PRESSURE: 85 MMHG | SYSTOLIC BLOOD PRESSURE: 148 MMHG

## 2018-08-28 VITALS — SYSTOLIC BLOOD PRESSURE: 138 MMHG | DIASTOLIC BLOOD PRESSURE: 69 MMHG

## 2018-08-28 VITALS — DIASTOLIC BLOOD PRESSURE: 50 MMHG | SYSTOLIC BLOOD PRESSURE: 94 MMHG

## 2018-08-28 VITALS — SYSTOLIC BLOOD PRESSURE: 143 MMHG | DIASTOLIC BLOOD PRESSURE: 71 MMHG

## 2018-08-28 VITALS — DIASTOLIC BLOOD PRESSURE: 65 MMHG | SYSTOLIC BLOOD PRESSURE: 123 MMHG

## 2018-08-28 VITALS — SYSTOLIC BLOOD PRESSURE: 140 MMHG | DIASTOLIC BLOOD PRESSURE: 74 MMHG

## 2018-08-28 LAB
ADD MANUAL DIFF: NO
ANION GAP SERPL CALC-SCNC: 13 MMOL/L (ref 5–15)
BASOPHILS NFR BLD AUTO: 0.5 % (ref 0–2)
BUN SERPL-MCNC: 48 MG/DL (ref 7–18)
CALCIUM SERPL-MCNC: 7.8 MG/DL (ref 8.5–10.1)
CHLORIDE SERPL-SCNC: 109 MMOL/L (ref 98–107)
CO2 SERPL-SCNC: 20 MMOL/L (ref 21–32)
CREAT SERPL-MCNC: 6.3 MG/DL (ref 0.55–1.3)
EOSINOPHIL NFR BLD AUTO: 1.5 % (ref 0–3)
ERYTHROCYTE [DISTWIDTH] IN BLOOD BY AUTOMATED COUNT: 16.4 % (ref 11.6–14.8)
HCT VFR BLD CALC: 26.9 % (ref 42–52)
HGB BLD-MCNC: 8.7 G/DL (ref 14.2–18)
LYMPHOCYTES NFR BLD AUTO: 10.2 % (ref 20–45)
MCV RBC AUTO: 91 FL (ref 80–99)
MONOCYTES NFR BLD AUTO: 11.3 % (ref 1–10)
NEUTROPHILS NFR BLD AUTO: 76.6 % (ref 45–75)
PHOSPHATE SERPL-MCNC: 4.8 MG/DL (ref 2.5–4.9)
PLATELET # BLD: 214 K/UL (ref 150–450)
POTASSIUM SERPL-SCNC: 4.5 MMOL/L (ref 3.5–5.1)
RBC # BLD AUTO: 2.96 M/UL (ref 4.7–6.1)
SODIUM SERPL-SCNC: 142 MMOL/L (ref 136–145)
WBC # BLD AUTO: 10.7 K/UL (ref 4.8–10.8)

## 2018-08-28 RX ADMIN — HYDRALAZINE HYDROCHLORIDE SCH MG: 50 TABLET ORAL at 17:27

## 2018-08-28 RX ADMIN — SODIUM CITRATE AND CITRIC ACID MONOHYDRATE SCH ML: 500; 334 SOLUTION ORAL at 12:23

## 2018-08-28 RX ADMIN — DOXAZOSIN MESYLATE SCH MG: 4 TABLET ORAL at 08:25

## 2018-08-28 RX ADMIN — METOPROLOL TARTRATE SCH MG: 50 TABLET, FILM COATED ORAL at 08:26

## 2018-08-28 RX ADMIN — METOPROLOL TARTRATE SCH MG: 50 TABLET, FILM COATED ORAL at 21:00

## 2018-08-28 RX ADMIN — INSULIN ASPART SCH UNITS: 100 INJECTION, SOLUTION INTRAVENOUS; SUBCUTANEOUS at 21:19

## 2018-08-28 RX ADMIN — HYDRALAZINE HYDROCHLORIDE SCH MG: 50 TABLET ORAL at 02:10

## 2018-08-28 RX ADMIN — SODIUM CITRATE AND CITRIC ACID MONOHYDRATE SCH ML: 500; 334 SOLUTION ORAL at 17:27

## 2018-08-28 RX ADMIN — HEPARIN SODIUM SCH UNITS: 5000 INJECTION INTRAVENOUS; SUBCUTANEOUS at 21:00

## 2018-08-28 RX ADMIN — HEPARIN SODIUM SCH UNITS: 5000 INJECTION INTRAVENOUS; SUBCUTANEOUS at 08:31

## 2018-08-28 RX ADMIN — DOXAZOSIN MESYLATE SCH MG: 4 TABLET ORAL at 21:16

## 2018-08-28 RX ADMIN — ISOSORBIDE MONONITRATE SCH MG: 30 TABLET, EXTENDED RELEASE ORAL at 08:25

## 2018-08-28 RX ADMIN — ATORVASTATIN CALCIUM SCH MG: 20 TABLET, FILM COATED ORAL at 21:16

## 2018-08-28 RX ADMIN — HYDRALAZINE HYDROCHLORIDE SCH MG: 50 TABLET ORAL at 10:00

## 2018-08-28 RX ADMIN — BENZTROPINE MESYLATE SCH MG: 1 TABLET ORAL at 08:25

## 2018-08-28 RX ADMIN — INSULIN ASPART SCH UNITS: 100 INJECTION, SOLUTION INTRAVENOUS; SUBCUTANEOUS at 05:59

## 2018-08-28 RX ADMIN — INSULIN ASPART SCH UNITS: 100 INJECTION, SOLUTION INTRAVENOUS; SUBCUTANEOUS at 12:23

## 2018-08-28 RX ADMIN — INSULIN ASPART SCH UNITS: 100 INJECTION, SOLUTION INTRAVENOUS; SUBCUTANEOUS at 17:24

## 2018-08-28 RX ADMIN — ASPIRIN 81 MG SCH MG: 81 TABLET ORAL at 08:27

## 2018-08-28 RX ADMIN — SODIUM CITRATE AND CITRIC ACID MONOHYDRATE SCH ML: 500; 334 SOLUTION ORAL at 08:27

## 2018-08-28 NOTE — NEPHROLOGY PROGRESS NOTE
Assessment/Plan


Problem List:  


(1) Nephrotic syndrome


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


(3) Malignant hypertension (arteriolar nephrosclerosis)


(4) Anemia in chronic kidney disease


(5) CKD (chronic kidney disease) stage 5, GFR less than 15 ml/min


(6) CHF (congestive heart failure)


Plan


creat clear 8, 24 hr protein 7.9g heavy  continue bp and cardiac med titration, 

epogen, iron.  Possible av fistula in near future  transfuse 8/25 lab a bit 

worse;, add bicitra and clonidine for bp, mild wheezw check cxr


mild chf, lasix started, may need dialysis soon, CO2 better, reduice bicitra 

dose





Subjective


Constitutional:  Reports: weakness


HEENT:  Reports: no symptoms


Genitourinary:  Reports: no symptoms


Neurologic/Psychiatric:  Reports: no symptoms





Objective


Objective





Last 24 Hour Vital Signs








  Date Time  Temp Pulse Resp B/P (MAP) Pulse Ox O2 Delivery O2 Flow Rate FiO2


 


8/28/18 17:27    143/71    


 


8/28/18 17:27  49  143/71    


 


8/28/18 16:00  49      


 


8/28/18 16:00 96.8 49 20 143/71 (95) 98   





 96.8       


 


8/28/18 12:23    123/84    


 


8/28/18 12:00  46      


 


8/28/18 12:00 96.4 46 20 123/65 (84) 95   





 96.4       


 


8/28/18 10:00    122/84    


 


8/28/18 09:26 97.7       


 


8/28/18 09:00      Room Air  


 


8/28/18 08:27 97.7       


 


8/28/18 08:26  50  140/74    


 


8/28/18 08:26  50  140/74    


 


8/28/18 08:25    140/74    


 


8/28/18 08:00  61      


 


8/28/18 08:00 97.7 50 20 140/74 (96) 95   





 97.7       


 


8/28/18 05:57    138/69    


 


8/28/18 04:00  46      


 


8/28/18 04:00 97.7 51 20 138/69 (92) 96   





 97.7       


 


8/28/18 02:10    148/85    


 


8/28/18 00:00  57      


 


8/28/18 00:00 98.1 64 19 148/85 (106) 93   





 98.1       


 


8/27/18 21:30    132/80    


 


8/27/18 21:00  56  132/80    


 


8/27/18 21:00      Room Air  


 


8/27/18 20:00 97.7 56 19 132/80 (97) 94   





 97.7       


 


8/27/18 20:00  62      

















Intake and Output  


 


 8/27/18 8/28/18





 19:00 07:00


 


Intake Total 480 ml 


 


Output Total 350 ml 


 


Balance 130 ml 


 


  


 


Intake Oral 480 ml 


 


Output Urine Total 350 ml 


 


# Voids 3 4


 


# Bowel Movements 1 








Laboratory Tests


8/28/18 05:10: 


Sodium Level 142, Potassium Level 4.5, Chloride Level 109H, Carbon Dioxide 

Level 20L, Anion Gap 13, Blood Urea Nitrogen 48H, Creatinine 6.3H, Estimat 

Glomerular Filtration Rate 10.7, Glucose Level 110H, Calcium Level 7.8L, 

Calcium (Send out) [Pending], Phosphorus Level 4.8, Troponin I 0.267H, Pro-B-

Type Natriuretic Peptide 32464S, Parathyroid Hormone (Intact) [Pending], 

Hepatitis B Surface Antigen [Pending], Hepatitis B Surface Antibody [Pending], 

Hepatitis C Antibody [Pending], HIV (1&2) Antibody Rapid Negative


8/28/18 05:11: 


White Blood Count 10.7, Red Blood Count 2.96L, Hemoglobin 8.7L, Hematocrit 26.9L

, Mean Corpuscular Volume 91, Mean Corpuscular Hemoglobin 29.5, Mean 

Corpuscular Hemoglobin Concent 32.4, Red Cell Distribution Width 16.4H, 

Platelet Count 214, Mean Platelet Volume 6.7, Neutrophils (%) (Auto) 76.6H, 

Lymphocytes (%) (Auto) 10.2L, Monocytes (%) (Auto) 11.3H, Eosinophils (%) (Auto

) 1.5, Basophils (%) (Auto) 0.5


Height (Feet):  6


Height (Inches):  2.00


Weight (Pounds):  257


General Appearance:  no apparent distress, alert, obese


EENT:  normal ENT inspection


Neck:  non-tender


Cardiovascular:  normal rate, regular rhythm


Respiratory/Chest:  lungs clear


Abdomen:  non tender, soft


Extremities:  moderate edema


Neurologic:  CNs II-XII grossly normal











MACKENZIE MAY Aug 28, 2018 18:14

## 2018-08-28 NOTE — PROGRESS NOTE
DATE:  08/27/2018



CARDIOLOGY PROGRESS NOTE



SUBJECTIVE:  Still with cough and shortness of breath.  Troponin remains

elevated at 0.20.  Chest x-ray reveals increasing congestive heart

failure.



OBJECTIVE:

VITAL SIGNS:  Blood pressure 151/71, pulse 65, respirations 18, and

afebrile.

LUNGS:  With few rales bilaterally.

HEART:  Regular rhythm and rate.  Normal S1, S2 with a fourth heart

sound.

ABDOMEN:  Soft.

EXTREMITIES:  Trace edema.



LABORATORY DATA:  BUN 45, creatinine 2.3.  Pro-natriuretic peptide over

10,000.  Troponin is 0.258.  Hemoglobin 7.6 despite transfusion two days

ago.



IMPRESSION:

1. Acute myocardial infarction.

2. Acute on chronic renal failure.

3. Anemia.

4. Acute on chronic diastolic congestive heart failure.

5. Metabolic acidosis due to renal failure.

6. Moderate to severe protein-calorie malnutrition.



PLAN:

1. Packed red blood cell transfusion.

2. Additional diuresis.

3. Titrate anti-failure and antihypertensives.

4. We will need to consider more urgent dialysis if fails to respond to

aforementioned therapy.

5. Myocardial perfusion scan once hemodynamically stabilized.









  ______________________________________________

  Edmar Munguia M.D.





DR:  MAY

D:  08/28/2018 02:17

T:  08/28/2018 04:16

JOB#:  [id number]

CC:

## 2018-08-28 NOTE — GENERAL PROGRESS NOTE
Assessment/Plan


Problem List:  


(1) UTI (urinary tract infection)


ICD Codes:  N39.0 - Urinary tract infection, site not specified


SNOMED:  23598885


(2) Malignant hypertension (arteriolar nephrosclerosis)


ICD Codes:  I12.9 - Hypertensive chronic kidney disease with stage 1 through 

stage 4 chronic kidney disease, or unspecified chronic kidney disease


SNOMED:  44234391


(3) Diabetes


ICD Codes:  E11.9 - Type 2 diabetes mellitus without complications


SNOMED:  17623053


(4) ESRD (end stage renal disease)


ICD Codes:  N18.6 - End stage renal disease


SNOMED:  75785888, 674219841, 202964992


(5) Elevated troponin


ICD Codes:  R74.8 - Abnormal levels of other serum enzymes


SNOMED:  581281405, 080579404, 172160523


(6) Anemia in chronic kidney disease


ICD Codes:  N18.9 - Chronic kidney disease, unspecified; D63.1 - Anemia in 

chronic kidney disease


SNOMED:  158445708, 503016134


(7) CKD (chronic kidney disease) stage 5, GFR less than 15 ml/min


ICD Codes:  N18.5 - Chronic kidney disease, stage 5


SNOMED:  969586112


Assessment/Plan


cont current rx


epo/iron


stool ob- negative


o2


diuresis


monitor rcx


cards follow up


antiplt rx


transfuse as needed


stress test when chf better





Subjective


ROS Limited/Unobtainable:  No


Constitutional:  Reports: fever, malaise


HEENT:  Reports: no symptoms


Cardiovascular:  Reports: no symptoms


Respiratory:  Reports: cough, shortness of breath


Gastrointestinal/Abdominal:  Reports: no symptoms


Genitourinary:  Reports: no symptoms


Neurologic/Psychiatric:  Reports: no symptoms


Endocrine:  Reports: no symptoms


Hematologic/Lymphatic:  Reports: anemia


Allergies:  


Coded Allergies:  


     No Known Allergies (Unverified , 9/25/17)


Subjective


less sob. +c[place]urn. decreased h/h noted. no bleeding. 


s/p 1 additional unit prbc yesterday. no bleeding. cards noted. cxr with 

increased chf





Objective





Last 24 Hour Vital Signs








  Date Time  Temp Pulse Resp B/P (MAP) Pulse Ox O2 Delivery O2 Flow Rate FiO2


 


8/28/18 17:27    143/71    


 


8/28/18 17:27  49  143/71    


 


8/28/18 16:00  49      


 


8/28/18 16:00 96.8 49 20 143/71 (95) 98   





 96.8       


 


8/28/18 12:23    123/84    


 


8/28/18 12:00  46      


 


8/28/18 12:00 96.4 46 20 123/65 (84) 95   





 96.4       


 


8/28/18 10:00    122/84    


 


8/28/18 09:26 97.7       


 


8/28/18 09:00      Room Air  


 


8/28/18 08:27 97.7       


 


8/28/18 08:26  50  140/74    


 


8/28/18 08:26  50  140/74    


 


8/28/18 08:25    140/74    


 


8/28/18 08:00  61      


 


8/28/18 08:00 97.7 50 20 140/74 (96) 95   





 97.7       


 


8/28/18 05:57    138/69    


 


8/28/18 04:00  46      


 


8/28/18 04:00 97.7 51 20 138/69 (92) 96   





 97.7       


 


8/28/18 02:10    148/85    


 


8/28/18 00:00  57      


 


8/28/18 00:00 98.1 64 19 148/85 (106) 93   





 98.1       


 


8/27/18 21:30    132/80    


 


8/27/18 21:00  56  132/80    


 


8/27/18 21:00      Room Air  

















Intake and Output  


 


 8/27/18 8/28/18





 19:00 07:00


 


Intake Total 480 ml 


 


Output Total 350 ml 


 


Balance 130 ml 


 


  


 


Intake Oral 480 ml 


 


Output Urine Total 350 ml 


 


# Voids 3 4


 


# Bowel Movements 1 








Laboratory Tests


8/28/18 05:10: 


Sodium Level 142, Potassium Level 4.5, Chloride Level 109H, Carbon Dioxide 

Level 20L, Anion Gap 13, Blood Urea Nitrogen 48H, Creatinine 6.3H, Estimat 

Glomerular Filtration Rate 10.7, Glucose Level 110H, Calcium Level 7.8L, 

Calcium (Send out) [Pending], Phosphorus Level 4.8, Troponin I 0.267H, Pro-B-

Type Natriuretic Peptide 34936I, Parathyroid Hormone (Intact) [Pending], 

Hepatitis B Surface Antigen [Pending], Hepatitis B Surface Antibody [Pending], 

Hepatitis C Antibody [Pending], HIV (1&2) Antibody Rapid Negative


8/28/18 05:11: 


White Blood Count 10.7, Red Blood Count 2.96L, Hemoglobin 8.7L, Hematocrit 26.9L

, Mean Corpuscular Volume 91, Mean Corpuscular Hemoglobin 29.5, Mean 

Corpuscular Hemoglobin Concent 32.4, Red Cell Distribution Width 16.4H, 

Platelet Count 214, Mean Platelet Volume 6.7, Neutrophils (%) (Auto) 76.6H, 

Lymphocytes (%) (Auto) 10.2L, Monocytes (%) (Auto) 11.3H, Eosinophils (%) (Auto

) 1.5, Basophils (%) (Auto) 0.5


Height (Feet):  6


Height (Inches):  2.00


Weight (Pounds):  257


Objective


General Appearance:  WD/WN, alert


Neck:  supple


Cardiovascular:  normal rate


Respiratory/Chest:  chest wall non-tender, lungs clear, normal breath sounds


Abdomen:  normal bowel sounds, non tender, soft, no organomegaly


Neurologic:  CNs II-XII grossly normal, alert, oriented x 3, responsive











Michael Nunez MD Aug 28, 2018 20:06

## 2018-08-29 VITALS — DIASTOLIC BLOOD PRESSURE: 75 MMHG | SYSTOLIC BLOOD PRESSURE: 134 MMHG

## 2018-08-29 VITALS — DIASTOLIC BLOOD PRESSURE: 76 MMHG | SYSTOLIC BLOOD PRESSURE: 148 MMHG

## 2018-08-29 VITALS — SYSTOLIC BLOOD PRESSURE: 144 MMHG | DIASTOLIC BLOOD PRESSURE: 77 MMHG

## 2018-08-29 VITALS — DIASTOLIC BLOOD PRESSURE: 77 MMHG | SYSTOLIC BLOOD PRESSURE: 147 MMHG

## 2018-08-29 VITALS — DIASTOLIC BLOOD PRESSURE: 78 MMHG | SYSTOLIC BLOOD PRESSURE: 139 MMHG

## 2018-08-29 VITALS — DIASTOLIC BLOOD PRESSURE: 71 MMHG | SYSTOLIC BLOOD PRESSURE: 149 MMHG

## 2018-08-29 LAB
ADD MANUAL DIFF: NO
ANION GAP SERPL CALC-SCNC: 14 MMOL/L (ref 5–15)
BASOPHILS NFR BLD AUTO: 0.7 % (ref 0–2)
BUN SERPL-MCNC: 49 MG/DL (ref 7–18)
CALCIUM SERPL-MCNC: 7.7 MG/DL (ref 8.5–10.1)
CHLORIDE SERPL-SCNC: 108 MMOL/L (ref 98–107)
CO2 SERPL-SCNC: 19 MMOL/L (ref 21–32)
CREAT SERPL-MCNC: 6.3 MG/DL (ref 0.55–1.3)
EOSINOPHIL NFR BLD AUTO: 2.1 % (ref 0–3)
ERYTHROCYTE [DISTWIDTH] IN BLOOD BY AUTOMATED COUNT: 17 % (ref 11.6–14.8)
HCT VFR BLD CALC: 27.5 % (ref 42–52)
HGB BLD-MCNC: 9 G/DL (ref 14.2–18)
LYMPHOCYTES NFR BLD AUTO: 11.9 % (ref 20–45)
MCV RBC AUTO: 92 FL (ref 80–99)
MONOCYTES NFR BLD AUTO: 12.3 % (ref 1–10)
NEUTROPHILS NFR BLD AUTO: 73 % (ref 45–75)
PLATELET # BLD: 212 K/UL (ref 150–450)
POTASSIUM SERPL-SCNC: 4.2 MMOL/L (ref 3.5–5.1)
RBC # BLD AUTO: 2.98 M/UL (ref 4.7–6.1)
SODIUM SERPL-SCNC: 141 MMOL/L (ref 136–145)
WBC # BLD AUTO: 10 K/UL (ref 4.8–10.8)

## 2018-08-29 RX ADMIN — SODIUM CITRATE AND CITRIC ACID MONOHYDRATE SCH ML: 500; 334 SOLUTION ORAL at 19:00

## 2018-08-29 RX ADMIN — DOXAZOSIN MESYLATE SCH MG: 4 TABLET ORAL at 09:12

## 2018-08-29 RX ADMIN — ASPIRIN 81 MG SCH MG: 81 TABLET ORAL at 09:13

## 2018-08-29 RX ADMIN — INSULIN ASPART SCH UNITS: 100 INJECTION, SOLUTION INTRAVENOUS; SUBCUTANEOUS at 21:23

## 2018-08-29 RX ADMIN — BENZTROPINE MESYLATE SCH MG: 1 TABLET ORAL at 09:13

## 2018-08-29 RX ADMIN — INSULIN ASPART SCH UNITS: 100 INJECTION, SOLUTION INTRAVENOUS; SUBCUTANEOUS at 12:19

## 2018-08-29 RX ADMIN — ISOSORBIDE MONONITRATE SCH MG: 30 TABLET, EXTENDED RELEASE ORAL at 09:12

## 2018-08-29 RX ADMIN — HEPARIN SODIUM SCH UNITS: 5000 INJECTION INTRAVENOUS; SUBCUTANEOUS at 21:00

## 2018-08-29 RX ADMIN — SODIUM CITRATE AND CITRIC ACID MONOHYDRATE SCH ML: 500; 334 SOLUTION ORAL at 09:11

## 2018-08-29 RX ADMIN — METOPROLOL TARTRATE SCH MG: 50 TABLET, FILM COATED ORAL at 09:13

## 2018-08-29 RX ADMIN — HYDRALAZINE HYDROCHLORIDE SCH MG: 50 TABLET ORAL at 19:00

## 2018-08-29 RX ADMIN — HYDRALAZINE HYDROCHLORIDE SCH MG: 50 TABLET ORAL at 02:15

## 2018-08-29 RX ADMIN — METOPROLOL TARTRATE SCH MG: 50 TABLET, FILM COATED ORAL at 09:00

## 2018-08-29 RX ADMIN — DOXAZOSIN MESYLATE SCH MG: 4 TABLET ORAL at 21:21

## 2018-08-29 RX ADMIN — INSULIN ASPART SCH UNITS: 100 INJECTION, SOLUTION INTRAVENOUS; SUBCUTANEOUS at 17:27

## 2018-08-29 RX ADMIN — ATORVASTATIN CALCIUM SCH MG: 20 TABLET, FILM COATED ORAL at 21:21

## 2018-08-29 RX ADMIN — INSULIN ASPART SCH UNITS: 100 INJECTION, SOLUTION INTRAVENOUS; SUBCUTANEOUS at 06:27

## 2018-08-29 RX ADMIN — HYDRALAZINE HYDROCHLORIDE SCH MG: 50 TABLET ORAL at 10:48

## 2018-08-29 RX ADMIN — HEPARIN SODIUM SCH UNITS: 5000 INJECTION INTRAVENOUS; SUBCUTANEOUS at 09:14

## 2018-08-29 RX ADMIN — METOPROLOL TARTRATE SCH MG: 50 TABLET, FILM COATED ORAL at 21:22

## 2018-08-29 RX ADMIN — ERYTHROPOIETIN SCH UNITS: 20000 INJECTION, SOLUTION INTRAVENOUS; SUBCUTANEOUS at 21:22

## 2018-08-29 RX ADMIN — SODIUM CITRATE AND CITRIC ACID MONOHYDRATE SCH ML: 500; 334 SOLUTION ORAL at 12:23

## 2018-08-29 NOTE — NEPHROLOGY PROGRESS NOTE
Assessment/Plan


Problem List:  


(1) Nephrotic syndrome


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


(3) Malignant hypertension (arteriolar nephrosclerosis)


(4) Anemia in chronic kidney disease


(5) CKD (chronic kidney disease) stage 5, GFR less than 15 ml/min


(6) CHF (congestive heart failure)


Plan


creat clear 8, 24 hr protein 7.9g heavy  continue bp and cardiac med titration, 

epogen, iron.  Possible av fistula in near future  transfuse 8/25 lab a bit 

worse;, add bicitra and clonidine for bp, mild wheezw check cxr


mild chf, lasix started, may need dialysis soon, CO2 better, reduice bicitra 

dose, now off clonidine as had bradycardia





Subjective


Constitutional:  Reports: weakness


HEENT:  Reports: no symptoms


Genitourinary:  Reports: no symptoms


Neurologic/Psychiatric:  Reports: no symptoms





Objective


Objective





Last 24 Hour Vital Signs








  Date Time  Temp Pulse Resp B/P (MAP) Pulse Ox O2 Delivery O2 Flow Rate FiO2


 


8/29/18 12:00  62      


 


8/29/18 12:00 97.8 75 18 149/71 (97) 97   





 97.8       


 


8/29/18 10:48    130/71    


 


8/29/18 09:12    134/75    


 


8/29/18 09:12  78  134/75    


 


8/29/18 09:00      Room Air  


 


8/29/18 08:00  75      


 


8/29/18 08:00 97.9 78 18 134/75 (94) 95   





 97.9       


 


8/29/18 04:00  59      


 


8/29/18 04:00 97.5 70 20 147/77 (100) 97   





 97.5       


 


8/29/18 02:15    139/78    


 


8/29/18 00:00  74      


 


8/29/18 00:00 97.5 70 20 139/78 (98) 98   





 97.5       


 


8/28/18 22:00    94/50    


 


8/28/18 21:00      Room Air  


 


8/28/18 21:00  65  94/50    


 


8/28/18 20:00  53      


 


8/28/18 20:00 97.3 65 20 94/50 (65) 96   





 97.3       


 


8/28/18 17:27    143/71    


 


8/28/18 17:27  49  143/71    

















Intake and Output  


 


 8/28/18 8/29/18





 19:00 07:00


 


Intake Total 920 ml 800 ml


 


Balance 920 ml 800 ml


 


  


 


Intake Oral 920 ml 800 ml


 


# Voids 3 2


 


# Bowel Movements  2








Laboratory Tests


8/29/18 03:20: Stool Occult Blood Negative


8/29/18 06:15: 


White Blood Count 10.0, Red Blood Count 2.98L, Hemoglobin 9.0L, Hematocrit 27.5L

, Mean Corpuscular Volume 92, Mean Corpuscular Hemoglobin 30.1, Mean 

Corpuscular Hemoglobin Concent 32.6, Red Cell Distribution Width 17.0H, 

Platelet Count 212, Mean Platelet Volume 6.7, Neutrophils (%) (Auto) 73.0, 

Lymphocytes (%) (Auto) 11.9L, Monocytes (%) (Auto) 12.3H, Eosinophils (%) (Auto

) 2.1, Basophils (%) (Auto) 0.7, Sodium Level 141, Potassium Level 4.2, 

Chloride Level 108H, Carbon Dioxide Level 19L, Anion Gap 14, Blood Urea 

Nitrogen 49H, Creatinine 6.3H, Estimat Glomerular Filtration Rate 10.7, Glucose 

Level 119H, Calcium Level 7.7L


Height (Feet):  6


Height (Inches):  2.00


Weight (Pounds):  277


General Appearance:  no apparent distress, alert, obese


EENT:  normal ENT inspection


Neck:  non-tender, normal alignment


Cardiovascular:  normal rate


Respiratory/Chest:  lungs clear


Abdomen:  non tender, soft


Neurologic:  CNs II-XII grossly normal











MACKENZIE MAY Aug 29, 2018 16:27

## 2018-08-29 NOTE — GENERAL PROGRESS NOTE
Assessment/Plan


Problem List:  


(1) UTI (urinary tract infection)


ICD Codes:  N39.0 - Urinary tract infection, site not specified


SNOMED:  89340826


(2) Malignant hypertension (arteriolar nephrosclerosis)


ICD Codes:  I12.9 - Hypertensive chronic kidney disease with stage 1 through 

stage 4 chronic kidney disease, or unspecified chronic kidney disease


SNOMED:  45116305


(3) Diabetes


ICD Codes:  E11.9 - Type 2 diabetes mellitus without complications


SNOMED:  70392346


(4) ESRD (end stage renal disease)


ICD Codes:  N18.6 - End stage renal disease


SNOMED:  13742619, 742571154, 409096598


(5) Elevated troponin


ICD Codes:  R74.8 - Abnormal levels of other serum enzymes


SNOMED:  593577504, 742366799, 046267386


(6) Anemia in chronic kidney disease


ICD Codes:  N18.9 - Chronic kidney disease, unspecified; D63.1 - Anemia in 

chronic kidney disease


SNOMED:  442112402, 812077739


(7) CKD (chronic kidney disease) stage 5, GFR less than 15 ml/min


ICD Codes:  N18.5 - Chronic kidney disease, stage 5


SNOMED:  612595808


Status:  stable, progressing


Assessment/Plan


cont current rx


epo/iron


o2


iv diuresis


monitor cxr


cards follow up


antiplt rx


transfuse as needed


stress test when chf better





Subjective


ROS Limited/Unobtainable:  No


Constitutional:  Reports: malaise, weakness


HEENT:  Reports: no symptoms


Cardiovascular:  Reports: no symptoms


Respiratory:  Reports: shortness of breath, SOB with excertion


Gastrointestinal/Abdominal:  Reports: no symptoms


Genitourinary:  Reports: no symptoms


Neurologic/Psychiatric:  Reports: no symptoms


Endocrine:  Reports: no symptoms


Hematologic/Lymphatic:  Reports: anemia


Allergies:  


Coded Allergies:  


     No Known Allergies (Unverified , 9/25/17)


All Systems:  reviewed and negative except above


Subjective


no events. still with sob, especially with exertion. no bleeding. cards and 

renal noted. no fevers or chills.





Objective





Last 24 Hour Vital Signs








  Date Time  Temp Pulse Resp B/P (MAP) Pulse Ox O2 Delivery O2 Flow Rate FiO2


 


8/29/18 16:00 97.7 65 20 148/76 (100) 95   





 97.7       


 


8/29/18 12:00  62      


 


8/29/18 12:00 97.8 75 18 149/71 (97) 97   





 97.8       


 


8/29/18 10:48    130/71    


 


8/29/18 09:12    134/75    


 


8/29/18 09:12  78  134/75    


 


8/29/18 09:00      Room Air  


 


8/29/18 08:00  75      


 


8/29/18 08:00 97.9 78 18 134/75 (94) 95   





 97.9       


 


8/29/18 04:00  59      


 


8/29/18 04:00 97.5 70 20 147/77 (100) 97   





 97.5       


 


8/29/18 02:15    139/78    


 


8/29/18 00:00  74      


 


8/29/18 00:00 97.5 70 20 139/78 (98) 98   





 97.5       


 


8/28/18 22:00    94/50    


 


8/28/18 21:00      Room Air  


 


8/28/18 21:00  65  94/50    


 


8/28/18 20:00  53      


 


8/28/18 20:00 97.3 65 20 94/50 (65) 96   





 97.3       

















Intake and Output  


 


 8/28/18 8/29/18





 19:00 07:00


 


Intake Total 920 ml 800 ml


 


Balance 920 ml 800 ml


 


  


 


Intake Oral 920 ml 800 ml


 


# Voids 3 2


 


# Bowel Movements  2








Laboratory Tests


8/29/18 03:20: Stool Occult Blood Negative


8/29/18 06:15: 


White Blood Count 10.0, Red Blood Count 2.98L, Hemoglobin 9.0L, Hematocrit 27.5L

, Mean Corpuscular Volume 92, Mean Corpuscular Hemoglobin 30.1, Mean 

Corpuscular Hemoglobin Concent 32.6, Red Cell Distribution Width 17.0H, 

Platelet Count 212, Mean Platelet Volume 6.7, Neutrophils (%) (Auto) 73.0, 

Lymphocytes (%) (Auto) 11.9L, Monocytes (%) (Auto) 12.3H, Eosinophils (%) (Auto

) 2.1, Basophils (%) (Auto) 0.7, Sodium Level 141, Potassium Level 4.2, 

Chloride Level 108H, Carbon Dioxide Level 19L, Anion Gap 14, Blood Urea 

Nitrogen 49H, Creatinine 6.3H, Estimat Glomerular Filtration Rate 10.7, Glucose 

Level 119H, Calcium Level 7.7L


Height (Feet):  6


Height (Inches):  2.00


Weight (Pounds):  277


Objective


General Appearance:  WD/WN, alert


Neck:  supple


Cardiovascular:  normal rate


Respiratory/Chest:  chest wall non-tender, lungs clear, normal breath sounds


Abdomen:  normal bowel sounds, non tender, soft, no organomegaly


Neurologic:  CNs II-XII grossly normal, alert, oriented x 3, responsive











Michael Nunez MD Aug 29, 2018 19:17

## 2018-08-29 NOTE — PROGRESS NOTE
DATE:  08/28/2018



CARDIOLOGY PROGRESS NOTE



SUBJECTIVE:  The patient received a packed red blood cell transfusion

yesterday.



OBJECTIVE:

VITAL SIGNS:  His blood pressure range has improved now to 143/71, heart

rate 49, respiratory rate 20, and he is afebrile.  Monitored rhythm, now

sinus bradycardia with rate in the high 30s at times, all asymptomatic.

LUNGS:  With few rales.

CARDIAC:  Regular rhythm.  Slow rate.  Normal S1, S2 with a fourth heart

sound.

ABDOMEN:  Soft.

EXTREMITIES:  Trace edema.



LABORATORY DATA:  White count 10.7 and hemoglobin 8.7.  Pro-natriuretic

peptide still around 10,000.  Troponin 0.267.



IMPRESSION:

1. Acute myocardial infarction.

2. Chronic kidney disease, stage 5.

3. Sinus bradycardia due to clonidine.

4. Acute on chronic diastolic congestive heart failure.



PLAN:

1. Discontinue clonidine.

2. Continue diuresis.

3. Transfuse for dropping hemoglobin below 8.

4. IV iron and Epogen therapy.

5. Continue aspirin and statin therapy.

6. Add alternate antihypertensives in lieu of clonidine.









  ______________________________________________

  Edmar Munguia M.D.





DR:  KLAUDIA

D:  08/28/2018 22:37

T:  08/29/2018 02:42

JOB#:  [id number]

CC:

## 2018-08-30 VITALS — DIASTOLIC BLOOD PRESSURE: 74 MMHG | SYSTOLIC BLOOD PRESSURE: 146 MMHG

## 2018-08-30 VITALS — DIASTOLIC BLOOD PRESSURE: 82 MMHG | SYSTOLIC BLOOD PRESSURE: 152 MMHG

## 2018-08-30 VITALS — DIASTOLIC BLOOD PRESSURE: 80 MMHG | SYSTOLIC BLOOD PRESSURE: 147 MMHG

## 2018-08-30 VITALS — DIASTOLIC BLOOD PRESSURE: 73 MMHG | SYSTOLIC BLOOD PRESSURE: 130 MMHG

## 2018-08-30 VITALS — DIASTOLIC BLOOD PRESSURE: 61 MMHG | SYSTOLIC BLOOD PRESSURE: 141 MMHG

## 2018-08-30 VITALS — DIASTOLIC BLOOD PRESSURE: 75 MMHG | SYSTOLIC BLOOD PRESSURE: 143 MMHG

## 2018-08-30 RX ADMIN — METOPROLOL TARTRATE SCH MG: 50 TABLET, FILM COATED ORAL at 09:39

## 2018-08-30 RX ADMIN — HEPARIN SODIUM SCH UNITS: 5000 INJECTION INTRAVENOUS; SUBCUTANEOUS at 09:00

## 2018-08-30 RX ADMIN — INSULIN ASPART SCH UNITS: 100 INJECTION, SOLUTION INTRAVENOUS; SUBCUTANEOUS at 16:30

## 2018-08-30 RX ADMIN — BENZTROPINE MESYLATE SCH MG: 1 TABLET ORAL at 09:40

## 2018-08-30 RX ADMIN — HEPARIN SODIUM SCH UNITS: 5000 INJECTION INTRAVENOUS; SUBCUTANEOUS at 20:47

## 2018-08-30 RX ADMIN — DOXAZOSIN MESYLATE SCH MG: 4 TABLET ORAL at 09:39

## 2018-08-30 RX ADMIN — ISOSORBIDE MONONITRATE SCH MG: 30 TABLET, EXTENDED RELEASE ORAL at 09:39

## 2018-08-30 RX ADMIN — SODIUM CITRATE AND CITRIC ACID MONOHYDRATE SCH ML: 500; 334 SOLUTION ORAL at 09:40

## 2018-08-30 RX ADMIN — SODIUM CITRATE AND CITRIC ACID MONOHYDRATE SCH ML: 500; 334 SOLUTION ORAL at 17:52

## 2018-08-30 RX ADMIN — ATORVASTATIN CALCIUM SCH MG: 20 TABLET, FILM COATED ORAL at 20:44

## 2018-08-30 RX ADMIN — ASPIRIN 81 MG SCH MG: 81 TABLET ORAL at 09:40

## 2018-08-30 RX ADMIN — INSULIN ASPART SCH UNITS: 100 INJECTION, SOLUTION INTRAVENOUS; SUBCUTANEOUS at 06:14

## 2018-08-30 RX ADMIN — HYDRALAZINE HYDROCHLORIDE SCH MG: 50 TABLET ORAL at 02:21

## 2018-08-30 RX ADMIN — INSULIN ASPART SCH UNITS: 100 INJECTION, SOLUTION INTRAVENOUS; SUBCUTANEOUS at 12:28

## 2018-08-30 RX ADMIN — HYDRALAZINE HYDROCHLORIDE SCH MG: 50 TABLET ORAL at 09:40

## 2018-08-30 RX ADMIN — DOXAZOSIN MESYLATE SCH MG: 4 TABLET ORAL at 20:44

## 2018-08-30 RX ADMIN — METOPROLOL TARTRATE SCH MG: 50 TABLET, FILM COATED ORAL at 20:45

## 2018-08-30 RX ADMIN — SODIUM CITRATE AND CITRIC ACID MONOHYDRATE SCH ML: 500; 334 SOLUTION ORAL at 12:29

## 2018-08-30 RX ADMIN — HYDRALAZINE HYDROCHLORIDE SCH MG: 50 TABLET ORAL at 17:53

## 2018-08-30 RX ADMIN — INSULIN ASPART SCH UNITS: 100 INJECTION, SOLUTION INTRAVENOUS; SUBCUTANEOUS at 20:47

## 2018-08-30 NOTE — NEPHROLOGY PROGRESS NOTE
Assessment/Plan


Problem List:  


(1) Nephrotic syndrome


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


(3) Malignant hypertension (arteriolar nephrosclerosis)


(4) Anemia in chronic kidney disease


(5) CKD (chronic kidney disease) stage 5, GFR less than 15 ml/min


(6) CHF (congestive heart failure)


Plan


creat clear 8, 24 hr protein 7.9g heavy  continue bp and cardiac med titration, 

epogen, iron.  Possible av fistula in near future  transfuse 8/25 lab a bit 

worse;, add bicitra and clonidine for bp, mild wheezw check cxr


mild chf, lasix started, may need dialysis soon, CO2 better, reduice bicitra 

dose, now off clonidine as had bradycardia, cxr 8/29 improved, continue current 

meds





Subjective


Constitutional:  Reports: weakness


HEENT:  Reports: no symptoms


Genitourinary:  Reports: no symptoms


Neurologic/Psychiatric:  Reports: no symptoms





Objective


Objective





Last 24 Hour Vital Signs








  Date Time  Temp Pulse Resp B/P (MAP) Pulse Ox O2 Delivery O2 Flow Rate FiO2


 


8/30/18 09:40    152/82    


 


8/30/18 09:40  74  152/82    


 


8/30/18 09:39    152/82    


 


8/30/18 09:39  74  152/82    


 


8/30/18 09:00      Room Air  


 


8/30/18 08:07 97.7 74 20 152/82 (105) 95   





 97.7       


 


8/30/18 07:14 97.7       


 


8/30/18 04:00  86      


 


8/30/18 04:00 98.6 69 18 130/73 (92) 97   





 98.6       


 


8/30/18 02:21    143/75    


 


8/30/18 00:00  56      


 


8/30/18 00:00 98.9 63 20 143/75 (97) 95   





 98.9       


 


8/29/18 21:22  68  144/77    


 


8/29/18 21:00      Room Air  


 


8/29/18 20:00  87      


 


8/29/18 20:00 97.6 65 20 144/77 (99) 97   





 97.6       


 


8/29/18 19:00    148/76    


 


8/29/18 19:00  65  148/76    


 


8/29/18 16:00  60      


 


8/29/18 16:00 97.7 65 20 148/76 (100) 95   





 97.7       

















Intake and Output  


 


 8/29/18 8/30/18





 19:00 07:00


 


Intake Total 360 ml 


 


Output Total  650 ml


 


Balance 360 ml -650 ml


 


  


 


Intake Oral 360 ml 


 


Output Urine Total  650 ml


 


# Voids 2 


 


# Bowel Movements 1 








Laboratory Tests


8/30/18 08:20: Stool Occult Blood Negative


Height (Feet):  6


Height (Inches):  2.00


Weight (Pounds):  277


General Appearance:  no apparent distress, alert


EENT:  normal ENT inspection


Neck:  normal alignment


Cardiovascular:  normal rate


Respiratory/Chest:  lungs clear


Abdomen:  non tender, soft


Extremities:  moderate edema


Neurologic:  CNs II-XII grossly normal











MACKENZIE MAY Aug 30, 2018 12:43

## 2018-08-30 NOTE — DIAGNOSTIC IMAGING REPORT
Indication: Cough

 

Technique: One view of the chest

 

Comparison: 8/27/2018

 

Findings: Previously demonstrated interstitial edema has improved, with some

residual. There is some atelectasis at the left lung base. There may be a small

amount of pleural fluid bilaterally.

 

Impression: Improved interstitial edema, with some residual, over 2 days

## 2018-08-30 NOTE — DIAGNOSTIC IMAGING REPORT
--------------- APPROVED REPORT --------------





CPT Code: 42110



Present Symptoms

Comments: Swelling and pain





BILATERAL: Imaging reveals a patent deep venous system bilaterally. There is no evidence 

of thrombus within the femoral, popliteal or tibial segments. The greater saphenous veins 

are also within normal limits. Doppler indicates normal spontaneous flow within these 

segments.

## 2018-08-30 NOTE — PROGRESS NOTE
DATE:  08/29/2018



CARDIOLOGY PROGRESS NOTE



SUBJECTIVE:  The patient is still short of breath with exertion.



OBJECTIVE:

VITAL SIGNS:  His blood pressure is 148/76, heart rate 65, respiratory rate

20, and afebrile.  Heart rate, bradyarrhythmias have resolved.  Off

clonidine.

LUNGS:  Few rales.

HEART:  Regular rhythm and rate.  Normal S1, S2 with a fourth heart

sound.

ABDOMEN:  Soft.

EXTREMITIES:  No edema.



LABORATORY DATA:  White count 10 and hemoglobin 9.  Potassium 4.2, BUN 49,

and creatinine 6.3.



IMPRESSION:

1. Acute myocardial infarction.

2. Acute on chronic diastolic congestive heart failure.

3. Stage 5 renal failure.

4. Metabolic acidosis.

5. Anemia, multifactorial.

6. Type 2 diabetes mellitus.

7. Sinus node disease with bradycardia.

8. Sensitive to some drugs.



PLAN:

1. Continue to monitor hemoglobin and transfuse if less than 8 g.

2. Continue intravenous iron and Epogen.

3. Continue maximizing anti-failure, antihypertensive, and antianginal

regimen.

4. Will likely need to initiate hemodialysis.

5. Will need to defer AV graft or fistula until coronary angiography can

be performed.

6. The patient is not stable for a stress test at this time.









  ______________________________________________

  Edmar Munguia M.D.





DR:  KALLIE

D:  08/29/2018 19:28

T:  08/30/2018 01:33

JOB#:  [id number]

CC:

## 2018-08-30 NOTE — GENERAL PROGRESS NOTE
Assessment/Plan


Problem List:  


(1) UTI (urinary tract infection)


ICD Codes:  N39.0 - Urinary tract infection, site not specified


SNOMED:  83427714


(2) Malignant hypertension (arteriolar nephrosclerosis)


ICD Codes:  I12.9 - Hypertensive chronic kidney disease with stage 1 through 

stage 4 chronic kidney disease, or unspecified chronic kidney disease


SNOMED:  13472263


(3) Diabetes


ICD Codes:  E11.9 - Type 2 diabetes mellitus without complications


SNOMED:  32238927


(4) ESRD (end stage renal disease)


ICD Codes:  N18.6 - End stage renal disease


SNOMED:  92429383, 670645960, 527230185


(5) Elevated troponin


ICD Codes:  R74.8 - Abnormal levels of other serum enzymes


SNOMED:  683854650, 738602894, 505845465


(6) Anemia in chronic kidney disease


ICD Codes:  N18.9 - Chronic kidney disease, unspecified; D63.1 - Anemia in 

chronic kidney disease


SNOMED:  934208746, 935843404


(7) CKD (chronic kidney disease) stage 5, GFR less than 15 ml/min


ICD Codes:  N18.5 - Chronic kidney disease, stage 5


SNOMED:  438773321


Status:  stable, progressing


Assessment/Plan


cont current rx


epo/iron


o2


iv diuresis


monitor cxr


cards follow up


antiplt rx


transfuse as needed


add flonase


trial lidoderm patch for back


stress test when chf better





Subjective


ROS Limited/Unobtainable:  No


Constitutional:  Reports: malaise, weakness


HEENT:  Reports: no symptoms


Cardiovascular:  Reports: no symptoms


Respiratory:  Reports: cough, shortness of breath, SOB with excertion


Gastrointestinal/Abdominal:  Reports: no symptoms


Genitourinary:  Reports: no symptoms


Neurologic/Psychiatric:  Reports: no symptoms


Endocrine:  Reports: no symptoms


Hematologic/Lymphatic:  Reports: anemia


Allergies:  


Coded Allergies:  


     No Known Allergies (Unverified , 9/25/17)


All Systems:  reviewed and negative except above


Subjective


no events. still with sob, especially with exertion. no bleeding. cards and 

renal noted. no fevers or chills. 


c/o lower back pain and congestion





Objective





Last 24 Hour Vital Signs








  Date Time  Temp Pulse Resp B/P (MAP) Pulse Ox O2 Delivery O2 Flow Rate FiO2


 


8/30/18 04:00  86      


 


8/30/18 04:00 98.6 69 18 130/73 (92) 97   





 98.6       


 


8/30/18 02:21    143/75    


 


8/30/18 00:00  56      


 


8/30/18 00:00 98.9 63 20 143/75 (97) 95   





 98.9       


 


8/29/18 21:22  68  144/77    


 


8/29/18 21:00      Room Air  


 


8/29/18 20:00  87      


 


8/29/18 20:00 97.6 65 20 144/77 (99) 97   





 97.6       


 


8/29/18 19:00    148/76    


 


8/29/18 19:00  65  148/76    


 


8/29/18 16:00  60      


 


8/29/18 16:00 97.7 65 20 148/76 (100) 95   





 97.7       


 


8/29/18 12:00  62      


 


8/29/18 12:00 97.8 75 18 149/71 (97) 97   





 97.8       


 


8/29/18 10:48    130/71    


 


8/29/18 09:12    134/75    


 


8/29/18 09:12  78  134/75    


 


8/29/18 09:00      Room Air  


 


8/29/18 08:00  75      


 


8/29/18 08:00 97.9 78 18 134/75 (94) 95   





 97.9       

















Intake and Output  


 


 8/29/18 8/30/18





 19:00 07:00


 


Intake Total 360 ml 


 


Output Total  650 ml


 


Balance 360 ml -650 ml


 


  


 


Intake Oral 360 ml 


 


Output Urine Total  650 ml


 


# Voids 2 


 


# Bowel Movements 1 








Height (Feet):  6


Height (Inches):  2.00


Weight (Pounds):  277


Objective


General Appearance:  WD/WN, alert


Neck:  supple


Cardiovascular:  normal rate


Respiratory/Chest:  chest wall non-tender, lungs clear, normal breath sounds


Abdomen:  normal bowel sounds, non tender, soft, no organomegaly


Neurologic:  CNs II-XII grossly normal, alert, oriented x 3, responsive











Michael Nunez MD Aug 30, 2018 07:41

## 2018-08-31 VITALS — DIASTOLIC BLOOD PRESSURE: 73 MMHG | SYSTOLIC BLOOD PRESSURE: 139 MMHG

## 2018-08-31 VITALS — SYSTOLIC BLOOD PRESSURE: 145 MMHG | DIASTOLIC BLOOD PRESSURE: 80 MMHG

## 2018-08-31 VITALS — SYSTOLIC BLOOD PRESSURE: 149 MMHG | DIASTOLIC BLOOD PRESSURE: 91 MMHG

## 2018-08-31 VITALS — SYSTOLIC BLOOD PRESSURE: 142 MMHG | DIASTOLIC BLOOD PRESSURE: 76 MMHG

## 2018-08-31 VITALS — SYSTOLIC BLOOD PRESSURE: 144 MMHG | DIASTOLIC BLOOD PRESSURE: 75 MMHG

## 2018-08-31 VITALS — DIASTOLIC BLOOD PRESSURE: 67 MMHG | SYSTOLIC BLOOD PRESSURE: 144 MMHG

## 2018-08-31 LAB
ADD MANUAL DIFF: NO
ANION GAP SERPL CALC-SCNC: 13 MMOL/L (ref 5–15)
BASOPHILS NFR BLD AUTO: 0.9 % (ref 0–2)
BUN SERPL-MCNC: 51 MG/DL (ref 7–18)
CALCIUM SERPL-MCNC: 8.2 MG/DL (ref 8.5–10.1)
CHLORIDE SERPL-SCNC: 106 MMOL/L (ref 98–107)
CO2 SERPL-SCNC: 20 MMOL/L (ref 21–32)
CREAT SERPL-MCNC: 6.3 MG/DL (ref 0.55–1.3)
EOSINOPHIL NFR BLD AUTO: 2.1 % (ref 0–3)
ERYTHROCYTE [DISTWIDTH] IN BLOOD BY AUTOMATED COUNT: 17.5 % (ref 11.6–14.8)
HCT VFR BLD CALC: 26.8 % (ref 42–52)
HGB BLD-MCNC: 9 G/DL (ref 14.2–18)
LYMPHOCYTES NFR BLD AUTO: 18.7 % (ref 20–45)
MCV RBC AUTO: 94 FL (ref 80–99)
MONOCYTES NFR BLD AUTO: 13.6 % (ref 1–10)
NEUTROPHILS NFR BLD AUTO: 64.8 % (ref 45–75)
PLATELET # BLD: 207 K/UL (ref 150–450)
POTASSIUM SERPL-SCNC: 4.4 MMOL/L (ref 3.5–5.1)
RBC # BLD AUTO: 2.84 M/UL (ref 4.7–6.1)
SODIUM SERPL-SCNC: 138 MMOL/L (ref 136–145)
WBC # BLD AUTO: 7.9 K/UL (ref 4.8–10.8)

## 2018-08-31 RX ADMIN — HYDRALAZINE HYDROCHLORIDE SCH MG: 50 TABLET ORAL at 18:06

## 2018-08-31 RX ADMIN — INSULIN ASPART SCH UNITS: 100 INJECTION, SOLUTION INTRAVENOUS; SUBCUTANEOUS at 22:30

## 2018-08-31 RX ADMIN — ERYTHROPOIETIN SCH UNITS: 20000 INJECTION, SOLUTION INTRAVENOUS; SUBCUTANEOUS at 23:04

## 2018-08-31 RX ADMIN — DOXAZOSIN MESYLATE SCH MG: 4 TABLET ORAL at 21:48

## 2018-08-31 RX ADMIN — INSULIN ASPART SCH UNITS: 100 INJECTION, SOLUTION INTRAVENOUS; SUBCUTANEOUS at 18:16

## 2018-08-31 RX ADMIN — HEPARIN SODIUM SCH UNITS: 5000 INJECTION INTRAVENOUS; SUBCUTANEOUS at 09:57

## 2018-08-31 RX ADMIN — ATORVASTATIN CALCIUM SCH MG: 20 TABLET, FILM COATED ORAL at 21:48

## 2018-08-31 RX ADMIN — INSULIN ASPART SCH UNITS: 100 INJECTION, SOLUTION INTRAVENOUS; SUBCUTANEOUS at 12:05

## 2018-08-31 RX ADMIN — METOPROLOL TARTRATE SCH MG: 50 TABLET, FILM COATED ORAL at 21:48

## 2018-08-31 RX ADMIN — SODIUM CITRATE AND CITRIC ACID MONOHYDRATE SCH ML: 500; 334 SOLUTION ORAL at 12:05

## 2018-08-31 RX ADMIN — ASPIRIN 81 MG SCH MG: 81 TABLET ORAL at 09:54

## 2018-08-31 RX ADMIN — HEPARIN SODIUM SCH UNITS: 5000 INJECTION INTRAVENOUS; SUBCUTANEOUS at 21:51

## 2018-08-31 RX ADMIN — SODIUM CITRATE AND CITRIC ACID MONOHYDRATE SCH ML: 500; 334 SOLUTION ORAL at 18:08

## 2018-08-31 RX ADMIN — METOPROLOL TARTRATE SCH MG: 50 TABLET, FILM COATED ORAL at 09:56

## 2018-08-31 RX ADMIN — HYDRALAZINE HYDROCHLORIDE SCH MG: 50 TABLET ORAL at 09:55

## 2018-08-31 RX ADMIN — DOXAZOSIN MESYLATE SCH MG: 4 TABLET ORAL at 09:54

## 2018-08-31 RX ADMIN — HYDRALAZINE HYDROCHLORIDE SCH MG: 50 TABLET ORAL at 01:44

## 2018-08-31 RX ADMIN — INSULIN ASPART SCH UNITS: 100 INJECTION, SOLUTION INTRAVENOUS; SUBCUTANEOUS at 06:39

## 2018-08-31 RX ADMIN — BENZTROPINE MESYLATE SCH MG: 1 TABLET ORAL at 09:56

## 2018-08-31 RX ADMIN — ISOSORBIDE MONONITRATE SCH MG: 30 TABLET, EXTENDED RELEASE ORAL at 09:55

## 2018-08-31 RX ADMIN — SODIUM CITRATE AND CITRIC ACID MONOHYDRATE SCH ML: 500; 334 SOLUTION ORAL at 09:54

## 2018-08-31 NOTE — NEPHROLOGY PROGRESS NOTE
Assessment/Plan


Problem List:  


(1) Nephrotic syndrome


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


(3) Malignant hypertension (arteriolar nephrosclerosis)


(4) Anemia in chronic kidney disease


(5) CKD (chronic kidney disease) stage 5, GFR less than 15 ml/min


(6) CHF (congestive heart failure)


Plan


creat clear 8, 24 hr protein 7.9g heavy  continue bp and cardiac med titration, 

epogen, iron.  Possible av fistula in near future  transfuse 8/25 lab a bit 

worse;, add bicitra and clonidine for bp, mild wheezw check cxr


mild chf, lasix started, may need dialysis soon, CO2 better, reduice bicitra 

dose, now off clonidine as had bradycardia, cxr 8/29 improved, continue current 

meds





Subjective


Constitutional:  Reports: weakness


HEENT:  Reports: no symptoms


Genitourinary:  Reports: no symptoms


Neurologic/Psychiatric:  Reports: no symptoms





Objective


Objective





Last 24 Hour Vital Signs








  Date Time  Temp Pulse Resp B/P (MAP) Pulse Ox O2 Delivery O2 Flow Rate FiO2


 


8/31/18 04:00  65      


 


8/31/18 04:00 97.7 72 18 144/75 (98) 96   





 97.7       


 


8/31/18 01:44    144/62    


 


8/31/18 00:00 97.9 58 20 144/67 (92) 96   





 97.9       


 


8/31/18 00:00  62      


 


8/30/18 21:00      Nasal Cannula 2.0 


 


8/30/18 20:45  65  147/80    


 


8/30/18 20:00  65      


 


8/30/18 20:00 97.9 65 20 147/80 (102) 98   





 97.9       


 


8/30/18 17:53    141/61    


 


8/30/18 17:53  53  141/61    


 


8/30/18 16:00 97.5 56 20 141/61 (87) 95   





 97.5       


 


8/30/18 16:00  53      


 


8/30/18 12:00  54      


 


8/30/18 12:00 97.5 55 18 146/74 (98) 95   





 97.5       


 


8/30/18 09:40    152/82    


 


8/30/18 09:40  74  152/82    


 


8/30/18 09:39    152/82    


 


8/30/18 09:39  74  152/82    


 


8/30/18 09:00      Room Air  


 


8/30/18 08:07 97.7 74 20 152/82 (105) 95   





 97.7       

















Intake and Output  


 


 8/30/18 8/31/18





 19:00 07:00


 


Intake Total 360 ml 200 ml


 


Balance 360 ml 200 ml


 


  


 


Intake Oral 360 ml 200 ml


 


# Voids 4 


 


# Bowel Movements 1 2








Laboratory Tests


8/30/18 08:20: Stool Occult Blood Negative


8/31/18 05:15: 


White Blood Count 7.9, Red Blood Count 2.84L, Hemoglobin 9.0L, Hematocrit 26.8L

, Mean Corpuscular Volume 94, Mean Corpuscular Hemoglobin 31.7H, Mean 

Corpuscular Hemoglobin Concent 33.7, Red Cell Distribution Width 17.5H, 

Platelet Count 207, Mean Platelet Volume 7.0, Neutrophils (%) (Auto) 64.8, 

Lymphocytes (%) (Auto) 18.7L, Monocytes (%) (Auto) 13.6H, Eosinophils (%) (Auto

) 2.1, Basophils (%) (Auto) 0.9, Sodium Level 138, Potassium Level 4.4, 

Chloride Level 106, Carbon Dioxide Level 20L, Anion Gap 13, Blood Urea Nitrogen 

51H, Creatinine 6.3H, Estimat Glomerular Filtration Rate 10.7, Glucose Level 101

, Calcium Level 8.2L


Height (Feet):  6


Height (Inches):  2.00


Weight (Pounds):  277


General Appearance:  no apparent distress, alert


EENT:  normal ENT inspection


Neck:  normal alignment


Cardiovascular:  normal rate, regular rhythm


Respiratory/Chest:  lungs clear


Abdomen:  non tender, soft


Extremities:  moderate edema


Neurologic:  CNs II-XII grossly normal











MACKENZIE MAY Aug 31, 2018 08:07

## 2018-08-31 NOTE — GENERAL PROGRESS NOTE
Assessment/Plan


Problem List:  


(1) UTI (urinary tract infection)


ICD Codes:  N39.0 - Urinary tract infection, site not specified


SNOMED:  99624985


(2) Malignant hypertension (arteriolar nephrosclerosis)


ICD Codes:  I12.9 - Hypertensive chronic kidney disease with stage 1 through 

stage 4 chronic kidney disease, or unspecified chronic kidney disease


SNOMED:  55671648


(3) Diabetes


ICD Codes:  E11.9 - Type 2 diabetes mellitus without complications


SNOMED:  14534535


(4) ESRD (end stage renal disease)


ICD Codes:  N18.6 - End stage renal disease


SNOMED:  97345952, 491334595, 497208638


(5) Elevated troponin


ICD Codes:  R74.8 - Abnormal levels of other serum enzymes


SNOMED:  537266674, 051228296, 339505437


(6) Anemia in chronic kidney disease


ICD Codes:  N18.9 - Chronic kidney disease, unspecified; D63.1 - Anemia in 

chronic kidney disease


SNOMED:  871687733, 027204194


(7) CKD (chronic kidney disease) stage 5, GFR less than 15 ml/min


ICD Codes:  N18.5 - Chronic kidney disease, stage 5


SNOMED:  304855332


Assessment/Plan


cont current rx


epo/iron


o2


iv diuresis


monitor cxr


cards follow up


antiplt rx


transfuse as needed


add afrin


trial lidoderm patch for back


stress test when chf better





Subjective


ROS Limited/Unobtainable:  No


Constitutional:  Reports: malaise, weakness


HEENT:  Reports: no symptoms


Cardiovascular:  Reports: no symptoms


Respiratory:  Reports: cough, shortness of breath


Gastrointestinal/Abdominal:  Reports: no symptoms


Genitourinary:  Reports: no symptoms


Neurologic/Psychiatric:  Reports: no symptoms


Endocrine:  Reports: no symptoms


Hematologic/Lymphatic:  Reports: no symptoms


Allergies:  


Coded Allergies:  


     No Known Allergies (Unverified , 9/25/17)


All Systems:  reviewed and negative except above


Subjective


no events. less sob but complaints of increased nasal congestion. cxr shows 

less chf. no chest pain


.





Objective





Last 24 Hour Vital Signs








  Date Time  Temp Pulse Resp B/P (MAP) Pulse Ox O2 Delivery O2 Flow Rate FiO2


 


8/31/18 12:11 97.5 58 20 149/91 (110) 95   





 97.5       


 


8/31/18 11:57 97.7       


 


8/31/18 10:11 97.7       


 


8/31/18 09:56  74  145/80    


 


8/31/18 09:55    145/80    


 


8/31/18 09:55    145/80    


 


8/31/18 09:55  74  145/80    


 


8/31/18 09:00      Nasal Cannula 2.0 


 


8/31/18 08:00 97.7 74 20 145/80 (101) 95   





 97.7       


 


8/31/18 04:00  65      


 


8/31/18 04:00 97.7 72 18 144/75 (98) 96   





 97.7       


 


8/31/18 01:44    144/62    


 


8/31/18 00:00 97.9 58 20 144/67 (92) 96   





 97.9       


 


8/31/18 00:00  62      


 


8/30/18 21:00      Nasal Cannula 2.0 


 


8/30/18 20:45  65  147/80    


 


8/30/18 20:00  65      


 


8/30/18 20:00 97.9 65 20 147/80 (102) 98   





 97.9       


 


8/30/18 17:53    141/61    


 


8/30/18 17:53  53  141/61    


 


8/30/18 16:00 97.5 56 20 141/61 (87) 95   





 97.5       


 


8/30/18 16:00  53      

















Intake and Output  


 


 8/30/18 8/31/18





 19:00 07:00


 


Intake Total 360 ml 200 ml


 


Balance 360 ml 200 ml


 


  


 


Intake Oral 360 ml 200 ml


 


# Voids 4 


 


# Bowel Movements 1 2








Laboratory Tests


8/31/18 05:15: 


White Blood Count 7.9, Red Blood Count 2.84L, Hemoglobin 9.0L, Hematocrit 26.8L

, Mean Corpuscular Volume 94, Mean Corpuscular Hemoglobin 31.7H, Mean 

Corpuscular Hemoglobin Concent 33.7, Red Cell Distribution Width 17.5H, 

Platelet Count 207, Mean Platelet Volume 7.0, Neutrophils (%) (Auto) 64.8, 

Lymphocytes (%) (Auto) 18.7L, Monocytes (%) (Auto) 13.6H, Eosinophils (%) (Auto

) 2.1, Basophils (%) (Auto) 0.9, Sodium Level 138, Potassium Level 4.4, 

Chloride Level 106, Carbon Dioxide Level 20L, Anion Gap 13, Blood Urea Nitrogen 

51H, Creatinine 6.3H, Estimat Glomerular Filtration Rate 10.7, Glucose Level 101

, Calcium Level 8.2L


Height (Feet):  6


Height (Inches):  2.00


Weight (Pounds):  277


Objective


General Appearance:  WD/WN, alert


Neck:  supple


Cardiovascular:  normal rate


Respiratory/Chest:  chest wall non-tender, lungs clear, normal breath sounds


Abdomen:  normal bowel sounds, non tender, soft, no organomegaly


Neurologic:  CNs II-XII grossly normal, alert, oriented x 3, responsive











Michael Nunez MD Aug 31, 2018 15:10

## 2018-09-01 VITALS — SYSTOLIC BLOOD PRESSURE: 130 MMHG | DIASTOLIC BLOOD PRESSURE: 54 MMHG

## 2018-09-01 VITALS — SYSTOLIC BLOOD PRESSURE: 140 MMHG | DIASTOLIC BLOOD PRESSURE: 72 MMHG

## 2018-09-01 VITALS — SYSTOLIC BLOOD PRESSURE: 114 MMHG | DIASTOLIC BLOOD PRESSURE: 53 MMHG

## 2018-09-01 VITALS — SYSTOLIC BLOOD PRESSURE: 137 MMHG | DIASTOLIC BLOOD PRESSURE: 75 MMHG

## 2018-09-01 VITALS — DIASTOLIC BLOOD PRESSURE: 74 MMHG | SYSTOLIC BLOOD PRESSURE: 138 MMHG

## 2018-09-01 VITALS — DIASTOLIC BLOOD PRESSURE: 80 MMHG | SYSTOLIC BLOOD PRESSURE: 164 MMHG

## 2018-09-01 VITALS — SYSTOLIC BLOOD PRESSURE: 162 MMHG | DIASTOLIC BLOOD PRESSURE: 80 MMHG

## 2018-09-01 LAB
ADD MANUAL DIFF: NO
ANION GAP SERPL CALC-SCNC: 14 MMOL/L (ref 5–15)
BASOPHILS NFR BLD AUTO: 1 % (ref 0–2)
BUN SERPL-MCNC: 50 MG/DL (ref 7–18)
CALCIUM SERPL-MCNC: 7.9 MG/DL (ref 8.5–10.1)
CHLORIDE SERPL-SCNC: 107 MMOL/L (ref 98–107)
CO2 SERPL-SCNC: 20 MMOL/L (ref 21–32)
CREAT SERPL-MCNC: 6.5 MG/DL (ref 0.55–1.3)
EOSINOPHIL NFR BLD AUTO: 2.5 % (ref 0–3)
ERYTHROCYTE [DISTWIDTH] IN BLOOD BY AUTOMATED COUNT: 17.7 % (ref 11.6–14.8)
HCT VFR BLD CALC: 26.7 % (ref 42–52)
HGB BLD-MCNC: 8.7 G/DL (ref 14.2–18)
LYMPHOCYTES NFR BLD AUTO: 21.2 % (ref 20–45)
MCV RBC AUTO: 94 FL (ref 80–99)
MONOCYTES NFR BLD AUTO: 13.7 % (ref 1–10)
NEUTROPHILS NFR BLD AUTO: 61.7 % (ref 45–75)
PLATELET # BLD: 192 K/UL (ref 150–450)
POTASSIUM SERPL-SCNC: 4.3 MMOL/L (ref 3.5–5.1)
RBC # BLD AUTO: 2.86 M/UL (ref 4.7–6.1)
SODIUM SERPL-SCNC: 141 MMOL/L (ref 136–145)
WBC # BLD AUTO: 7.2 K/UL (ref 4.8–10.8)

## 2018-09-01 RX ADMIN — DOXAZOSIN MESYLATE SCH MG: 4 TABLET ORAL at 09:22

## 2018-09-01 RX ADMIN — METOPROLOL TARTRATE SCH MG: 50 TABLET, FILM COATED ORAL at 09:22

## 2018-09-01 RX ADMIN — DOXAZOSIN MESYLATE SCH MG: 4 TABLET ORAL at 21:18

## 2018-09-01 RX ADMIN — INSULIN ASPART SCH UNITS: 100 INJECTION, SOLUTION INTRAVENOUS; SUBCUTANEOUS at 06:13

## 2018-09-01 RX ADMIN — INSULIN ASPART SCH UNITS: 100 INJECTION, SOLUTION INTRAVENOUS; SUBCUTANEOUS at 21:23

## 2018-09-01 RX ADMIN — SODIUM CITRATE AND CITRIC ACID MONOHYDRATE SCH ML: 500; 334 SOLUTION ORAL at 09:21

## 2018-09-01 RX ADMIN — OXYMETAZOLINE HYDROCHLORIDE PRN SPRAY: 0.05 SPRAY NASAL at 21:19

## 2018-09-01 RX ADMIN — HYDRALAZINE HYDROCHLORIDE SCH MG: 50 TABLET ORAL at 09:22

## 2018-09-01 RX ADMIN — METOPROLOL TARTRATE SCH MG: 50 TABLET, FILM COATED ORAL at 21:18

## 2018-09-01 RX ADMIN — BENZTROPINE MESYLATE SCH MG: 1 TABLET ORAL at 09:22

## 2018-09-01 RX ADMIN — ISOSORBIDE MONONITRATE SCH MG: 30 TABLET, EXTENDED RELEASE ORAL at 09:23

## 2018-09-01 RX ADMIN — SODIUM CITRATE AND CITRIC ACID MONOHYDRATE SCH ML: 500; 334 SOLUTION ORAL at 16:57

## 2018-09-01 RX ADMIN — HYDRALAZINE HYDROCHLORIDE SCH MG: 50 TABLET ORAL at 02:00

## 2018-09-01 RX ADMIN — INSULIN ASPART SCH UNITS: 100 INJECTION, SOLUTION INTRAVENOUS; SUBCUTANEOUS at 16:59

## 2018-09-01 RX ADMIN — HEPARIN SODIUM SCH UNITS: 5000 INJECTION INTRAVENOUS; SUBCUTANEOUS at 09:25

## 2018-09-01 RX ADMIN — HYDRALAZINE HYDROCHLORIDE SCH MG: 50 TABLET ORAL at 16:57

## 2018-09-01 RX ADMIN — HEPARIN SODIUM SCH UNITS: 5000 INJECTION INTRAVENOUS; SUBCUTANEOUS at 21:22

## 2018-09-01 RX ADMIN — ASPIRIN 81 MG SCH MG: 81 TABLET ORAL at 09:23

## 2018-09-01 RX ADMIN — ATORVASTATIN CALCIUM SCH MG: 20 TABLET, FILM COATED ORAL at 21:18

## 2018-09-01 RX ADMIN — INSULIN ASPART SCH UNITS: 100 INJECTION, SOLUTION INTRAVENOUS; SUBCUTANEOUS at 11:45

## 2018-09-01 RX ADMIN — SODIUM CITRATE AND CITRIC ACID MONOHYDRATE SCH ML: 500; 334 SOLUTION ORAL at 11:50

## 2018-09-01 NOTE — GENERAL PROGRESS NOTE
Assessment/Plan


Problem List:  


(1) UTI (urinary tract infection)


ICD Codes:  N39.0 - Urinary tract infection, site not specified


SNOMED:  36355006


(2) Malignant hypertension (arteriolar nephrosclerosis)


ICD Codes:  I12.9 - Hypertensive chronic kidney disease with stage 1 through 

stage 4 chronic kidney disease, or unspecified chronic kidney disease


SNOMED:  57877808


(3) Diabetes


ICD Codes:  E11.9 - Type 2 diabetes mellitus without complications


SNOMED:  78653542


(4) ESRD (end stage renal disease)


ICD Codes:  N18.6 - End stage renal disease


SNOMED:  07191284, 872338069, 062266863


(5) Elevated troponin


ICD Codes:  R74.8 - Abnormal levels of other serum enzymes


SNOMED:  136892682, 683395756, 295584372


(6) Anemia in chronic kidney disease


ICD Codes:  N18.9 - Chronic kidney disease, unspecified; D63.1 - Anemia in 

chronic kidney disease


SNOMED:  310692863, 060237270


(7) CKD (chronic kidney disease) stage 5, GFR less than 15 ml/min


ICD Codes:  N18.5 - Chronic kidney disease, stage 5


SNOMED:  155617985


Status:  stable, progressing


Assessment/Plan


cont current rx


epo/iron


o2


iv diuresis as needed


monitor cxr


cards follow up


antiplt rx


transfuse as needed


add afrin


trial lidoderm patch for back


stress test when chf better


check room air o2 sat





Subjective


ROS Limited/Unobtainable:  No


Constitutional:  Reports: malaise, weakness


HEENT:  Reports: no symptoms


Cardiovascular:  Reports: no symptoms


Respiratory:  Reports: shortness of breath


Gastrointestinal/Abdominal:  Reports: no symptoms


Genitourinary:  Reports: no symptoms


Neurologic/Psychiatric:  Reports: no symptoms


Endocrine:  Reports: no symptoms


Hematologic/Lymphatic:  Reports: anemia


Allergies:  


Coded Allergies:  


     No Known Allergies (Unverified , 9/25/17)


All Systems:  reviewed and negative except above


Subjective


complaints of sob due to nasal congestion


on 5liters nasal cannula. no cp





Objective





Last 24 Hour Vital Signs








  Date Time  Temp Pulse Resp B/P (MAP) Pulse Ox O2 Delivery O2 Flow Rate FiO2


 


9/1/18 08:00 97.9 75 20 162/80 (107) 98   





 97.9       


 


9/1/18 04:00 97.0 58 20 114/53 (73) 98   





 97.0       


 


9/1/18 03:44  55      


 


9/1/18 02:00    130/54    


 


9/1/18 01:50  60  130/54 (79)    


 


9/1/18 00:00 97.0 70 20 138/74 (95) 98   





 97.0       


 


8/31/18 23:45  71      


 


8/31/18 21:48  61  139/73    


 


8/31/18 21:00      Nasal Cannula 2.0 


 


8/31/18 20:00  66      


 


8/31/18 20:00 97.0 61 20 139/73 (95) 95   





 97.0       


 


8/31/18 18:06    142/76    


 


8/31/18 18:06  59  142/76    


 


8/31/18 16:21 97.2 59 20 142/76 (98) 97   





 97.2       


 


8/31/18 16:00  62      


 


8/31/18 12:11 97.5 58 20 149/91 (110) 95   





 97.5       


 


8/31/18 12:00  59      


 


8/31/18 11:57 97.7       


 


8/31/18 10:11 97.7       


 


8/31/18 09:56  74  145/80    


 


8/31/18 09:55    145/80    


 


8/31/18 09:55    145/80    


 


8/31/18 09:55  74  145/80    

















Intake and Output  


 


 8/31/18 9/1/18





 19:00 07:00


 


Intake Total 360 ml 


 


Balance 360 ml 


 


  


 


Intake Oral 360 ml 


 


# Voids 3 1








Laboratory Tests


9/1/18 05:45: 


White Blood Count 7.2, Red Blood Count 2.86L, Hemoglobin 8.7L, Hematocrit 26.7L

, Mean Corpuscular Volume 94, Mean Corpuscular Hemoglobin 30.6, Mean 

Corpuscular Hemoglobin Concent 32.7, Red Cell Distribution Width 17.7H, 

Platelet Count 192, Mean Platelet Volume 7.0, Neutrophils (%) (Auto) 61.7, 

Lymphocytes (%) (Auto) 21.2, Monocytes (%) (Auto) 13.7H, Eosinophils (%) (Auto) 

2.5, Basophils (%) (Auto) 1.0, Sodium Level 141, Potassium Level 4.3, Chloride 

Level 107, Carbon Dioxide Level 20L, Anion Gap 14, Blood Urea Nitrogen 50H, 

Creatinine 6.5H, Estimat Glomerular Filtration Rate 10.3, Glucose Level 101, 

Calcium Level 7.9L


Height (Feet):  6


Height (Inches):  2.00


Weight (Pounds):  277


Objective


General Appearance:  WD/WN, alert


Neck:  supple


Cardiovascular:  normal rate


Respiratory/Chest:  chest wall non-tender, lungs clear, normal breath sounds


Abdomen:  normal bowel sounds, non tender, soft, no organomegaly


Neurologic:  CNs II-XII grossly normal, alert, oriented x 3, responsive











Michael Nunez MD Sep 1, 2018 09:12

## 2018-09-01 NOTE — PROGRESS NOTE
DATE:  08/30/2018



CARDIOLOGY PROGRESS NOTE



Late entry for 08/30/2018.



SUBJECTIVE:  The patient has less shortness of breath.  No chest pain.  He

required transfusions since admission and has had continued diuresis.  He

still has low back pain.



OBJECTIVE:

VITAL SIGNS:  Blood pressure 147/80, pulse 65, respirations 20, afebrile,

and oxygen saturation 98% on 2 liters.

NECK:  Jugular venous pressure is slightly elevated.

LUNGS:  A few rales.

CARDIAC:  Regular rhythm and rate.  Normal S1 and S2 with a fourth heart

sound.

ABDOMEN:  Soft.

EXTREMITIES:  Trace lower extremity edema.



LABORATORY AND DIAGNOSTIC DATA:  No laboratory studies today.  Chest x-ray

yesterday is notable for improved interstitial edema.



IMPRESSION:

1. Acute myocardial infarction.

2. Acute on chronic renal failure with chronic kidney disease, 5.

3. Acute on chronic diastolic congestive heart failure.

4. Hypertensive heart disease with labile blood pressure.

5. Anemia of chronic kidney disease.



PLAN:

1. Diuresis.

2. Transfuse if hemoglobin less than 8 g.

3. Optimize anti-failure and antihypertensive regimen.

4. If stabilizes, myocardial perfusion scan, otherwise may need cardiac

catheterization, which will require transfer to a tertiary care

facility.

5. We will continue efforts to _____ hemodialysis and arrange for an AV

fistula once cardiac status is stable.









  ______________________________________________

  Edamr Munguia M.D.





DR:  KLAUDIA

D:  09/01/2018 02:54

T:  09/01/2018 04:06

JOB#:  [id number]

CC:

## 2018-09-01 NOTE — PROGRESS NOTE
DATE:  08/31/2018



CARDIOLOGY PROGRESS NOTE



SUBJECTIVE:  Less short of breath, some congestion in the facies and nose.

Denies chest pain.



OBJECTIVE:

VITAL SIGNS:  Blood pressure 145/80, pulse 74, respirations 18, and

afebrile.  Monitored rhythm, sinus and sinus bradycardia.

LUNGS:  With few rales.

NECK:  Jugular venous pressure is slightly elevated.

CARDIAC:  Regular rhythm and rate.  Normal S1 and S2 with a fourth heart

sound.

ABDOMEN:  Soft.

EXTREMITIES:  Trace edema.



LABORATORY DATA:  Sodium 138, potassium 4.4, bicarbonate 20, BUN 51, and

creatinine 6.3.  White count 7.9 and hemoglobin 9.



IMPRESSION:  Improved.



PLAN:

1. Continue stabilization of _____ myocardial infarction,

cardiovascular, and cardiopulmonary parameters.

2. Anticipate stress test over the next 48 hours if continues to

stabilize and subsequently we will determine timing of AV fistula

placement.

3. No emergent indication for dialysis at this time.









  ______________________________________________

  Edmar Munguia M.D.





DR:  KLAUDIA

D:  09/01/2018 02:56

T:  09/01/2018 04:16

JOB#:  [id number]

CC:

## 2018-09-02 VITALS — SYSTOLIC BLOOD PRESSURE: 143 MMHG | DIASTOLIC BLOOD PRESSURE: 82 MMHG

## 2018-09-02 VITALS — DIASTOLIC BLOOD PRESSURE: 85 MMHG | SYSTOLIC BLOOD PRESSURE: 153 MMHG

## 2018-09-02 VITALS — DIASTOLIC BLOOD PRESSURE: 82 MMHG | SYSTOLIC BLOOD PRESSURE: 149 MMHG

## 2018-09-02 VITALS — SYSTOLIC BLOOD PRESSURE: 161 MMHG | DIASTOLIC BLOOD PRESSURE: 86 MMHG

## 2018-09-02 VITALS — SYSTOLIC BLOOD PRESSURE: 144 MMHG | DIASTOLIC BLOOD PRESSURE: 88 MMHG

## 2018-09-02 VITALS — DIASTOLIC BLOOD PRESSURE: 77 MMHG | SYSTOLIC BLOOD PRESSURE: 144 MMHG

## 2018-09-02 LAB
ADD MANUAL DIFF: NO
ANION GAP SERPL CALC-SCNC: 11 MMOL/L (ref 5–15)
BASOPHILS NFR BLD AUTO: 0.7 % (ref 0–2)
BUN SERPL-MCNC: 48 MG/DL (ref 7–18)
CALCIUM SERPL-MCNC: 8.5 MG/DL (ref 8.5–10.1)
CHLORIDE SERPL-SCNC: 107 MMOL/L (ref 98–107)
CO2 SERPL-SCNC: 21 MMOL/L (ref 21–32)
CREAT SERPL-MCNC: 6.6 MG/DL (ref 0.55–1.3)
EOSINOPHIL NFR BLD AUTO: 2.9 % (ref 0–3)
ERYTHROCYTE [DISTWIDTH] IN BLOOD BY AUTOMATED COUNT: 17.6 % (ref 11.6–14.8)
HCT VFR BLD CALC: 28.4 % (ref 42–52)
HGB BLD-MCNC: 9.2 G/DL (ref 14.2–18)
LYMPHOCYTES NFR BLD AUTO: 18.3 % (ref 20–45)
MCV RBC AUTO: 95 FL (ref 80–99)
MONOCYTES NFR BLD AUTO: 13.7 % (ref 1–10)
NEUTROPHILS NFR BLD AUTO: 64.4 % (ref 45–75)
PLATELET # BLD: 200 K/UL (ref 150–450)
POTASSIUM SERPL-SCNC: 4.4 MMOL/L (ref 3.5–5.1)
RBC # BLD AUTO: 3.01 M/UL (ref 4.7–6.1)
SODIUM SERPL-SCNC: 139 MMOL/L (ref 136–145)
WBC # BLD AUTO: 8 K/UL (ref 4.8–10.8)

## 2018-09-02 RX ADMIN — INSULIN ASPART SCH UNITS: 100 INJECTION, SOLUTION INTRAVENOUS; SUBCUTANEOUS at 20:33

## 2018-09-02 RX ADMIN — OXYMETAZOLINE HYDROCHLORIDE PRN SPRAY: 0.05 SPRAY NASAL at 06:06

## 2018-09-02 RX ADMIN — OXYMETAZOLINE HYDROCHLORIDE PRN SPRAY: 0.05 SPRAY NASAL at 20:11

## 2018-09-02 RX ADMIN — ISOSORBIDE MONONITRATE SCH MG: 30 TABLET, EXTENDED RELEASE ORAL at 08:00

## 2018-09-02 RX ADMIN — INSULIN ASPART SCH UNITS: 100 INJECTION, SOLUTION INTRAVENOUS; SUBCUTANEOUS at 13:13

## 2018-09-02 RX ADMIN — HYDRALAZINE HYDROCHLORIDE SCH MG: 50 TABLET ORAL at 10:21

## 2018-09-02 RX ADMIN — METOPROLOL TARTRATE SCH MG: 50 TABLET, FILM COATED ORAL at 08:01

## 2018-09-02 RX ADMIN — DOXAZOSIN MESYLATE SCH MG: 4 TABLET ORAL at 08:00

## 2018-09-02 RX ADMIN — SODIUM CITRATE AND CITRIC ACID MONOHYDRATE SCH ML: 500; 334 SOLUTION ORAL at 17:34

## 2018-09-02 RX ADMIN — SODIUM CITRATE AND CITRIC ACID MONOHYDRATE SCH ML: 500; 334 SOLUTION ORAL at 08:01

## 2018-09-02 RX ADMIN — INSULIN ASPART SCH UNITS: 100 INJECTION, SOLUTION INTRAVENOUS; SUBCUTANEOUS at 17:39

## 2018-09-02 RX ADMIN — SODIUM CITRATE AND CITRIC ACID MONOHYDRATE SCH ML: 500; 334 SOLUTION ORAL at 13:15

## 2018-09-02 RX ADMIN — HYDRALAZINE HYDROCHLORIDE SCH MG: 50 TABLET ORAL at 17:34

## 2018-09-02 RX ADMIN — ASPIRIN 81 MG SCH MG: 81 TABLET ORAL at 08:00

## 2018-09-02 RX ADMIN — METOPROLOL TARTRATE SCH MG: 50 TABLET, FILM COATED ORAL at 20:32

## 2018-09-02 RX ADMIN — HEPARIN SODIUM SCH UNITS: 5000 INJECTION INTRAVENOUS; SUBCUTANEOUS at 08:02

## 2018-09-02 RX ADMIN — HYDRALAZINE HYDROCHLORIDE SCH MG: 50 TABLET ORAL at 02:26

## 2018-09-02 RX ADMIN — DOXAZOSIN MESYLATE SCH MG: 4 TABLET ORAL at 20:31

## 2018-09-02 RX ADMIN — INSULIN ASPART SCH UNITS: 100 INJECTION, SOLUTION INTRAVENOUS; SUBCUTANEOUS at 06:06

## 2018-09-02 RX ADMIN — BENZTROPINE MESYLATE SCH MG: 1 TABLET ORAL at 08:00

## 2018-09-02 RX ADMIN — ATORVASTATIN CALCIUM SCH MG: 20 TABLET, FILM COATED ORAL at 20:30

## 2018-09-02 RX ADMIN — HEPARIN SODIUM SCH UNITS: 5000 INJECTION INTRAVENOUS; SUBCUTANEOUS at 20:33

## 2018-09-02 NOTE — NEPHROLOGY PROGRESS NOTE
Assessment/Plan


Assessment


1) CKD V


2) No Uremic signs


3) DM II


4) NSTEMI


5) HTN


Plan:


Going for cardiac stress test


Will need HD soon


BP control is important





Subjective


Subjective


He has dyspnea with exertion, no N/V, no c/P





Objective


Objective





Last 24 Hour Vital Signs








  Date Time  Temp Pulse Resp B/P (MAP) Pulse Ox O2 Delivery O2 Flow Rate FiO2


 


9/2/18 09:19      Nasal Cannula 2.0 


 


9/2/18 08:01  76  161/86    


 


9/2/18 08:00    161/86    


 


9/2/18 08:00  76  161/86    


 


9/2/18 07:58 98.2 76 20 161/86 (111) 95   





 98.2       


 


9/2/18 04:00  64      


 


9/2/18 04:00 97.9 69 20 143/82 (102) 96   





 97.9       


 


9/2/18 02:26    142/74    


 


9/2/18 00:00  57      


 


9/2/18 00:00 98.2 58 20 144/88 (106) 96   





 98.2       


 


9/1/18 23:05      Nasal Cannula 2.0 


 


9/1/18 21:18  68  132/76    


 


9/1/18 20:00  72      


 


9/1/18 20:00 99.0 72 20 140/72 (94) 95   





 99.0       


 


9/1/18 16:57    137/75    


 


9/1/18 16:57  63  137/75    


 


9/1/18 16:00 98.2 63 20 137/75 (95) 95   





 98.2       


 


9/1/18 15:50  58      


 


9/1/18 12:00  73      


 


9/1/18 12:00 98.1 74 20 164/80 (108) 95   





 98.1       


 


9/1/18 10:23 97.9       

















Intake and Output  


 


 9/1/18 9/2/18





 19:00 07:00


 


Intake Total 840 ml 200 ml


 


Output Total  275 ml


 


Balance 840 ml -75 ml


 


  


 


Intake Oral 840 ml 200 ml


 


Output Urine Total  275 ml


 


# Voids 4 


 


# Bowel Movements 1 2








Laboratory Tests


9/2/18 05:05: 


White Blood Count 8.0, Red Blood Count 3.01L, Hemoglobin 9.2L, Hematocrit 28.4L

, Mean Corpuscular Volume 95, Mean Corpuscular Hemoglobin 30.4, Mean 

Corpuscular Hemoglobin Concent 32.2, Red Cell Distribution Width 17.6H, 

Platelet Count 200, Mean Platelet Volume 6.3L, Neutrophils (%) (Auto) 64.4, 

Lymphocytes (%) (Auto) 18.3L, Monocytes (%) (Auto) 13.7H, Eosinophils (%) (Auto

) 2.9, Basophils (%) (Auto) 0.7, Sodium Level 139, Potassium Level 4.4, 

Chloride Level 107, Carbon Dioxide Level 21, Anion Gap 11, Blood Urea Nitrogen 

48H, Creatinine 6.6H, Estimat Glomerular Filtration Rate 10.2, Glucose Level 

117H, Calcium Level 8.5, Magnesium Level 1.8, Troponin I 0.265H, Pro-B-Type 

Natriuretic Peptide 9627H


Height (Feet):  6


Height (Inches):  2.00


Weight (Pounds):  277


General Appearance:  WD/WN, no apparent distress, alert


EENT:  PERRL/EOMI


Neck:  non-tender, normal alignment


Cardiovascular:  normal rate, regular rhythm, JVD - high


Respiratory/Chest:  lungs clear


Abdomen:  normal bowel sounds, non tender


Extremities:  normal range of motion, moderate edema


Neurologic:  CNs II-XII grossly normal











Matt Vegas MD Sep 2, 2018 10:16

## 2018-09-02 NOTE — GENERAL PROGRESS NOTE
Assessment/Plan


Problem List:  


(1) UTI (urinary tract infection)


ICD Codes:  N39.0 - Urinary tract infection, site not specified


SNOMED:  49186239


(2) Malignant hypertension (arteriolar nephrosclerosis)


ICD Codes:  I12.9 - Hypertensive chronic kidney disease with stage 1 through 

stage 4 chronic kidney disease, or unspecified chronic kidney disease


SNOMED:  25263297


(3) Diabetes


ICD Codes:  E11.9 - Type 2 diabetes mellitus without complications


SNOMED:  28043598


(4) ESRD (end stage renal disease)


ICD Codes:  N18.6 - End stage renal disease


SNOMED:  03152321, 719877599, 826271706


(5) Elevated troponin


ICD Codes:  R74.8 - Abnormal levels of other serum enzymes


SNOMED:  239054560, 889074145, 090786239


(6) Anemia in chronic kidney disease


ICD Codes:  N18.9 - Chronic kidney disease, unspecified; D63.1 - Anemia in 

chronic kidney disease


SNOMED:  882124153, 265703696


(7) CKD (chronic kidney disease) stage 5, GFR less than 15 ml/min


ICD Codes:  N18.5 - Chronic kidney disease, stage 5


SNOMED:  482683194


Status:  stable


Assessment/Plan


prn o2


resp care


decongestants


monitor labs


transfuse prn


stress test tomorrow





Subjective


ROS Limited/Unobtainable:  No


Constitutional:  Reports: malaise, weakness


HEENT:  Reports: no symptoms


Cardiovascular:  Reports: no symptoms


Respiratory:  Reports: cough, shortness of breath


Gastrointestinal/Abdominal:  Reports: no symptoms


Genitourinary:  Reports: no symptoms


Neurologic/Psychiatric:  Reports: no symptoms


Endocrine:  Reports: no symptoms


Hematologic/Lymphatic:  Reports: anemia


Allergies:  


Coded Allergies:  


     No Known Allergies (Unverified , 9/25/17)


All Systems:  reviewed and negative except above


Subjective


sob better. normal sats on room air. no cp





Objective





Last 24 Hour Vital Signs








  Date Time  Temp Pulse Resp B/P (MAP) Pulse Ox O2 Delivery O2 Flow Rate FiO2


 


9/2/18 08:01  76  161/86    


 


9/2/18 08:00    161/86    


 


9/2/18 08:00  76  161/86    


 


9/2/18 07:58 98.2 76 20 161/86 (111) 95   





 98.2       


 


9/2/18 04:00  64      


 


9/2/18 04:00 97.9 69 20 143/82 (102) 96   





 97.9       


 


9/2/18 02:26    142/74    


 


9/2/18 00:00  57      


 


9/2/18 00:00 98.2 58 20 144/88 (106) 96   





 98.2       


 


9/1/18 23:05      Nasal Cannula 2.0 


 


9/1/18 21:18  68  132/76    


 


9/1/18 20:00  72      


 


9/1/18 20:00 99.0 72 20 140/72 (94) 95   





 99.0       


 


9/1/18 16:57    137/75    


 


9/1/18 16:57  63  137/75    


 


9/1/18 16:00 98.2 63 20 137/75 (95) 95   





 98.2       


 


9/1/18 15:50  58      


 


9/1/18 12:00  73      


 


9/1/18 12:00 98.1 74 20 164/80 (108) 95   





 98.1       


 


9/1/18 10:23 97.9       


 


9/1/18 09:24 97.9       


 


9/1/18 09:23    162/80    


 


9/1/18 09:23  75  162/80    


 


9/1/18 09:22    162/80    


 


9/1/18 09:22  75  162/80    


 


9/1/18 09:00      Nasal Cannula 2.0 

















Intake and Output  


 


 9/1/18 9/2/18





 19:00 07:00


 


Intake Total 840 ml 200 ml


 


Output Total  275 ml


 


Balance 840 ml -75 ml


 


  


 


Intake Oral 840 ml 200 ml


 


Output Urine Total  275 ml


 


# Voids 4 


 


# Bowel Movements 1 2








Laboratory Tests


9/2/18 05:05: 


White Blood Count 8.0, Red Blood Count 3.01L, Hemoglobin 9.2L, Hematocrit 28.4L

, Mean Corpuscular Volume 95, Mean Corpuscular Hemoglobin 30.4, Mean 

Corpuscular Hemoglobin Concent 32.2, Red Cell Distribution Width 17.6H, 

Platelet Count 200, Mean Platelet Volume 6.3L, Neutrophils (%) (Auto) 64.4, 

Lymphocytes (%) (Auto) 18.3L, Monocytes (%) (Auto) 13.7H, Eosinophils (%) (Auto

) 2.9, Basophils (%) (Auto) 0.7, Sodium Level 139, Potassium Level 4.4, 

Chloride Level 107, Carbon Dioxide Level 21, Anion Gap 11, Blood Urea Nitrogen 

48H, Creatinine 6.6H, Estimat Glomerular Filtration Rate 10.2, Glucose Level 

117H, Calcium Level 8.5, Magnesium Level 1.8, Troponin I 0.265H, Pro-B-Type 

Natriuretic Peptide 9627H


Height (Feet):  6


Height (Inches):  2.00


Weight (Pounds):  277


Objective


General Appearance:  WD/WN, alert


Neck:  supple


Cardiovascular:  normal rate


Respiratory/Chest:  chest wall non-tender, lungs clear, normal breath sounds


Abdomen:  normal bowel sounds, non tender, soft, no organomegaly


Neurologic:  CNs II-XII grossly normal, alert, oriented x 3, responsive











Michael Nunez MD Sep 2, 2018 08:21

## 2018-09-02 NOTE — PROGRESS NOTE
DATE:  09/02/2018



CARDIOLOGY PROGRESS NOTE



SUBJECTIVE:  No chest pain.  Much less shortness of breath.  Oxygen

saturations are normal on room air.



OBJECTIVE:

VITAL SIGNS:  Blood pressure 161/86, pulse 76, and respirations 18.

NECK:  Supple.  Jugular venous pressure mildly elevated.

LUNGS:  With no rales.

CARDIAC:  Regular rhythm and rate.

CARDIAC:  Regular rhythm and rate.  Normal S1 and S2 with a fourth heart

sound.

ABDOMEN:  Soft.

EXTREMITIES:  Without edema.



LABORATORY AND DIAGNOSTIC DATA:  White count 8 and hemoglobin 9.2.

Troponin is down to 0.265.  Pro-natriuretic peptide 9600.  Magnesium 1.8.

Potassium 4.4, BUN 48 and creatinine 6.6.



IMPRESSION:

1. Acute myocardial infarction.

2. Acute on chronic diastolic congestive heart failure.

3. Acute on chronic renal failure.

4. Chronic kidney disease 5.

5. Sinus node disease with bradycardia, on beta-blocker and worse with

clonidine.

6. Type 2 diabetes mellitus.



PLAN:

1. Continue current cardiovascular regimen.

2. Continue efforts to optimize blood pressure control.

3. Myocardial perfusion scan for assessment of coronary flow reserve.

4. Placement of permanent dialysis access _____ will follow.









  ______________________________________________

  Edmar Munguia M.D.





DR:  ALEXSANDRA

D:  09/02/2018 20:31

T:  09/02/2018 21:21

JOB#:  [id number]

CC:

## 2018-09-02 NOTE — PROGRESS NOTE
DATE:  09/01/2018



CARDIOLOGY PROGRESS NOTE



SUBJECTIVE:  Still has congestion and shortness of breath.  Still on oxygen

by nasal cannula.  No chest pain.  Diuresis efforts are more successful.



OBJECTIVE:

VITAL SIGNS:  Blood pressure 132/76, heart rate 68, and respiratory rate

18.

LUNGS:  With diminished breath sounds.  No wheezing.

CARDIAC:  Regular rhythm and rate.  Normal S1, S2 with a fourth heart

sound.

ABDOMEN:  Soft, nontender.  No edema.



LABORATORY DATA:  White count 7.2, hemoglobin 8.7.  Potassium 4.3, BUN 50,

and creatinine 6.5.



IMPRESSION:

1. Acute myocardial infarction.

2. Chronic kidney disease, stage 5.

3. Acute on chronic diastolic congestive heart failure.

4. Anemia of chronic kidney disease.

5. Hypertensive heart disease.



PLAN:

1. Same therapy.

2. Noninvasive ischemia workup planned over the next 48 hours if

condition continues to improve.

3. Followup laboratory studies and natriuretic peptide assay as well as

troponin level.









  ______________________________________________

  Edmar Munguia M.D.





DR:  MAY

D:  09/02/2018 02:01

T:  09/02/2018 03:07

JOB#:  [id number]

CC:

## 2018-09-03 VITALS — SYSTOLIC BLOOD PRESSURE: 142 MMHG | DIASTOLIC BLOOD PRESSURE: 82 MMHG

## 2018-09-03 VITALS — SYSTOLIC BLOOD PRESSURE: 146 MMHG | DIASTOLIC BLOOD PRESSURE: 75 MMHG

## 2018-09-03 VITALS — DIASTOLIC BLOOD PRESSURE: 75 MMHG | SYSTOLIC BLOOD PRESSURE: 145 MMHG

## 2018-09-03 VITALS — SYSTOLIC BLOOD PRESSURE: 154 MMHG | DIASTOLIC BLOOD PRESSURE: 88 MMHG

## 2018-09-03 VITALS — DIASTOLIC BLOOD PRESSURE: 84 MMHG | SYSTOLIC BLOOD PRESSURE: 156 MMHG

## 2018-09-03 VITALS — SYSTOLIC BLOOD PRESSURE: 141 MMHG | DIASTOLIC BLOOD PRESSURE: 66 MMHG

## 2018-09-03 LAB
ADD MANUAL DIFF: NO
ALBUMIN SERPL-MCNC: 2.9 G/DL (ref 3.4–5)
ALBUMIN/GLOB SERPL: 0.7 {RATIO} (ref 1–2.7)
ALP SERPL-CCNC: 90 U/L (ref 46–116)
ALT SERPL-CCNC: 41 U/L (ref 12–78)
ANION GAP SERPL CALC-SCNC: 12 MMOL/L (ref 5–15)
AST SERPL-CCNC: 24 U/L (ref 15–37)
BASOPHILS NFR BLD AUTO: 1.1 % (ref 0–2)
BILIRUB SERPL-MCNC: 0.2 MG/DL (ref 0.2–1)
BUN SERPL-MCNC: 47 MG/DL (ref 7–18)
CALCIUM SERPL-MCNC: 8.6 MG/DL (ref 8.5–10.1)
CHLORIDE SERPL-SCNC: 107 MMOL/L (ref 98–107)
CO2 SERPL-SCNC: 20 MMOL/L (ref 21–32)
CREAT SERPL-MCNC: 6.6 MG/DL (ref 0.55–1.3)
EOSINOPHIL NFR BLD AUTO: 0.1 % (ref 0–3)
ERYTHROCYTE [DISTWIDTH] IN BLOOD BY AUTOMATED COUNT: 17.7 % (ref 11.6–14.8)
GLOBULIN SER-MCNC: 4.1 G/DL
HCT VFR BLD CALC: 31.2 % (ref 42–52)
HGB BLD-MCNC: 10.2 G/DL (ref 14.2–18)
LYMPHOCYTES NFR BLD AUTO: 17.2 % (ref 20–45)
MCV RBC AUTO: 93 FL (ref 80–99)
MONOCYTES NFR BLD AUTO: 12.1 % (ref 1–10)
NEUTROPHILS NFR BLD AUTO: 69.5 % (ref 45–75)
PLATELET # BLD: 247 K/UL (ref 150–450)
POTASSIUM SERPL-SCNC: 4.3 MMOL/L (ref 3.5–5.1)
RBC # BLD AUTO: 3.35 M/UL (ref 4.7–6.1)
SODIUM SERPL-SCNC: 139 MMOL/L (ref 136–145)
WBC # BLD AUTO: 9.1 K/UL (ref 4.8–10.8)

## 2018-09-03 RX ADMIN — SODIUM CITRATE AND CITRIC ACID MONOHYDRATE SCH ML: 500; 334 SOLUTION ORAL at 17:24

## 2018-09-03 RX ADMIN — OXYMETAZOLINE HYDROCHLORIDE PRN SPRAY: 0.05 SPRAY NASAL at 02:11

## 2018-09-03 RX ADMIN — ERYTHROPOIETIN SCH UNITS: 20000 INJECTION, SOLUTION INTRAVENOUS; SUBCUTANEOUS at 21:02

## 2018-09-03 RX ADMIN — HEPARIN SODIUM SCH UNITS: 5000 INJECTION INTRAVENOUS; SUBCUTANEOUS at 21:01

## 2018-09-03 RX ADMIN — SODIUM CITRATE AND CITRIC ACID MONOHYDRATE SCH ML: 500; 334 SOLUTION ORAL at 08:57

## 2018-09-03 RX ADMIN — INSULIN ASPART SCH UNITS: 100 INJECTION, SOLUTION INTRAVENOUS; SUBCUTANEOUS at 06:08

## 2018-09-03 RX ADMIN — ASPIRIN 81 MG SCH MG: 81 TABLET ORAL at 08:57

## 2018-09-03 RX ADMIN — INSULIN ASPART SCH UNITS: 100 INJECTION, SOLUTION INTRAVENOUS; SUBCUTANEOUS at 16:13

## 2018-09-03 RX ADMIN — METOPROLOL TARTRATE SCH MG: 50 TABLET, FILM COATED ORAL at 08:55

## 2018-09-03 RX ADMIN — HYDRALAZINE HYDROCHLORIDE SCH MG: 50 TABLET ORAL at 10:12

## 2018-09-03 RX ADMIN — INSULIN ASPART SCH UNITS: 100 INJECTION, SOLUTION INTRAVENOUS; SUBCUTANEOUS at 21:00

## 2018-09-03 RX ADMIN — ATORVASTATIN CALCIUM SCH MG: 20 TABLET, FILM COATED ORAL at 21:04

## 2018-09-03 RX ADMIN — HEPARIN SODIUM SCH UNITS: 5000 INJECTION INTRAVENOUS; SUBCUTANEOUS at 09:09

## 2018-09-03 RX ADMIN — BENZTROPINE MESYLATE SCH MG: 1 TABLET ORAL at 08:56

## 2018-09-03 RX ADMIN — HYDRALAZINE HYDROCHLORIDE SCH MG: 50 TABLET ORAL at 02:11

## 2018-09-03 RX ADMIN — INSULIN ASPART SCH UNITS: 100 INJECTION, SOLUTION INTRAVENOUS; SUBCUTANEOUS at 11:15

## 2018-09-03 RX ADMIN — OXYMETAZOLINE HYDROCHLORIDE PRN SPRAY: 0.05 SPRAY NASAL at 21:07

## 2018-09-03 RX ADMIN — DOXAZOSIN MESYLATE SCH MG: 4 TABLET ORAL at 21:03

## 2018-09-03 RX ADMIN — ISOSORBIDE MONONITRATE SCH MG: 30 TABLET, EXTENDED RELEASE ORAL at 08:56

## 2018-09-03 RX ADMIN — DOXAZOSIN MESYLATE SCH MG: 4 TABLET ORAL at 08:57

## 2018-09-03 RX ADMIN — SODIUM CITRATE AND CITRIC ACID MONOHYDRATE SCH ML: 500; 334 SOLUTION ORAL at 12:27

## 2018-09-03 RX ADMIN — HYDRALAZINE HYDROCHLORIDE SCH MG: 50 TABLET ORAL at 17:25

## 2018-09-03 RX ADMIN — METOPROLOL TARTRATE SCH MG: 50 TABLET, FILM COATED ORAL at 21:05

## 2018-09-03 NOTE — NEPHROLOGY PROGRESS NOTE
Assessment/Plan


Assessment


1) CKD V


2) No Uremic signs


3) DM II


4) NSTEMI


5) HTN


Plan:


Going for cardiac stress test tomorrow


Will need HD soon


BP control is important





Subjective


Subjective


He denies N/V, appetite is OK





Objective


Objective





Last 24 Hour Vital Signs








  Date Time  Temp Pulse Resp B/P (MAP) Pulse Ox O2 Delivery O2 Flow Rate FiO2


 


9/3/18 09:00      Nasal Cannula 2.0 


 


9/3/18 08:56    154/88    


 


9/3/18 08:56  62  154/88    


 


9/3/18 08:55  62  154/88    


 


9/3/18 08:00  78      


 


9/3/18 08:00 98.4 62 24 154/88 (110) 98   





 98.4       


 


9/3/18 04:00  57      


 


9/3/18 04:00 98.0 67 20 145/75 (98) 98   





 98.0       


 


9/3/18 02:11    149/80    


 


9/3/18 00:00 98.2 63 20 141/66 (91) 95   





 98.2       


 


9/3/18 00:00  63      


 


9/2/18 21:00      Nasal Cannula 2.0 


 


9/2/18 20:32  64  144/82    


 


9/2/18 20:00 97.9 62 18 149/82 (104) 94   





 97.9       


 


9/2/18 20:00  67      


 


9/2/18 17:34    144/77    


 


9/2/18 17:34  64  144/77    


 


9/2/18 16:00  59      


 


9/2/18 15:53 97.9 64 20 144/77 (99) 95   





 97.9       


 


9/2/18 12:00  62      


 


9/2/18 12:00 98.0 62 20 153/85 (107) 95   





 98.0       


 


9/2/18 11:24 98.2       


 


9/2/18 10:25 98.2       


 


9/2/18 10:21    157/78    

















Intake and Output  


 


 9/2/18 9/3/18





 19:00 07:00


 


Intake Total 855 ml 300 ml


 


Balance 855 ml 300 ml


 


  


 


Intake Oral 855 ml 300 ml


 


# Voids 6 4


 


# Bowel Movements 2 








Laboratory Tests


9/3/18 07:20: 


White Blood Count 9.1, Red Blood Count 3.35L, Hemoglobin 10.2L, Hematocrit 31.2L

, Mean Corpuscular Volume 93, Mean Corpuscular Hemoglobin 30.4, Mean 

Corpuscular Hemoglobin Concent 32.6, Red Cell Distribution Width 17.7H, 

Platelet Count 247, Mean Platelet Volume 7.3, Neutrophils (%) (Auto) 69.5, 

Lymphocytes (%) (Auto) 17.2L, Monocytes (%) (Auto) 12.1H, Eosinophils (%) (Auto

) 0.1, Basophils (%) (Auto) 1.1, Sodium Level 139, Potassium Level 4.3, 

Chloride Level 107, Carbon Dioxide Level 20L, Anion Gap 12, Blood Urea Nitrogen 

47H, Creatinine 6.6H, Estimat Glomerular Filtration Rate 10.2, Glucose Level 

131H, Calcium Level 8.6, Total Bilirubin 0.2, Aspartate Amino Transf (AST/SGOT) 

24, Alanine Aminotransferase (ALT/SGPT) 41, Alkaline Phosphatase 90, Total 

Protein 7.0, Albumin 2.9L, Globulin 4.1, Albumin/Globulin Ratio 0.7L


Height (Feet):  6


Height (Inches):  2.00


Weight (Pounds):  277


General Appearance:  WD/WN, no apparent distress, alert


EENT:  PERRL/EOMI


Neck:  non-tender, normal alignment, supple


Cardiovascular:  normal rate, regular rhythm, no JVD


Respiratory/Chest:  lungs clear


Abdomen:  normal bowel sounds, non tender


Extremities:  normal range of motion


Neurologic:  CNs II-XII grossly normal











Matt Vegas MD Sep 3, 2018 09:57

## 2018-09-03 NOTE — GENERAL PROGRESS NOTE
Assessment/Plan


Problem List:  


(1) UTI (urinary tract infection)


ICD Codes:  N39.0 - Urinary tract infection, site not specified


SNOMED:  26433189


(2) Malignant hypertension (arteriolar nephrosclerosis)


ICD Codes:  I12.9 - Hypertensive chronic kidney disease with stage 1 through 

stage 4 chronic kidney disease, or unspecified chronic kidney disease


SNOMED:  75996198


(3) Diabetes


ICD Codes:  E11.9 - Type 2 diabetes mellitus without complications


SNOMED:  13099334


(4) ESRD (end stage renal disease)


ICD Codes:  N18.6 - End stage renal disease


SNOMED:  02466922, 191926570, 456954005


(5) Elevated troponin


ICD Codes:  R74.8 - Abnormal levels of other serum enzymes


SNOMED:  408548646, 797419666, 225615671


(6) Anemia in chronic kidney disease


ICD Codes:  N18.9 - Chronic kidney disease, unspecified; D63.1 - Anemia in 

chronic kidney disease


SNOMED:  376554683, 058419101


(7) CKD (chronic kidney disease) stage 5, GFR less than 15 ml/min


ICD Codes:  N18.5 - Chronic kidney disease, stage 5


SNOMED:  188832746


Status:  stable, progressing


Assessment/Plan


prn o2


resp care


decongestants


monitor labs


transfuse prn


stress test tomorrow





Subjective


ROS Limited/Unobtainable:  No


Constitutional:  Reports: malaise, weakness


HEENT:  Reports: no symptoms


Cardiovascular:  Reports: no symptoms


Respiratory:  Reports: no symptoms


Gastrointestinal/Abdominal:  Reports: no symptoms


Genitourinary:  Reports: no symptoms


Neurologic/Psychiatric:  Reports: no symptoms


Endocrine:  Reports: no symptoms


Hematologic/Lymphatic:  Reports: anemia


Allergies:  


Coded Allergies:  


     No Known Allergies (Unverified , 9/25/17)


All Systems:  reviewed and negative except above


Subjective


sob better. normal sats on room air. no chest pain


cards noted. stress test planned.





Objective





Last 24 Hour Vital Signs








  Date Time  Temp Pulse Resp B/P (MAP) Pulse Ox O2 Delivery O2 Flow Rate FiO2


 


9/3/18 04:00  57      


 


9/3/18 04:00 98.0 67 20 145/75 (98) 98   





 98.0       


 


9/3/18 02:11    149/80    


 


9/3/18 00:00 98.2 63 20 141/66 (91) 95   





 98.2       


 


9/3/18 00:00  63      


 


9/2/18 21:00      Nasal Cannula 2.0 


 


9/2/18 20:32  64  144/82    


 


9/2/18 20:00 97.9 62 18 149/82 (104) 94   





 97.9       


 


9/2/18 20:00  67      


 


9/2/18 17:34    144/77    


 


9/2/18 17:34  64  144/77    


 


9/2/18 16:00  59      


 


9/2/18 15:53 97.9 64 20 144/77 (99) 95   





 97.9       


 


9/2/18 12:00  62      


 


9/2/18 12:00 98.0 62 20 153/85 (107) 95   





 98.0       


 


9/2/18 11:24 98.2       


 


9/2/18 10:25 98.2       


 


9/2/18 10:21    157/78    


 


9/2/18 09:19      Nasal Cannula 2.0 

















Intake and Output  


 


 9/2/18 9/3/18





 19:00 07:00


 


Intake Total 855 ml 300 ml


 


Balance 855 ml 300 ml


 


  


 


Intake Oral 855 ml 300 ml


 


# Voids 6 4


 


# Bowel Movements 2 








Laboratory Tests


9/3/18 07:20: 


White Blood Count 9.1, Red Blood Count 3.35L, Hemoglobin 10.2L, Hematocrit 31.2L

, Mean Corpuscular Volume 93, Mean Corpuscular Hemoglobin 30.4, Mean 

Corpuscular Hemoglobin Concent 32.6, Red Cell Distribution Width 17.7H, 

Platelet Count 247, Mean Platelet Volume 7.3, Neutrophils (%) (Auto) 69.5, 

Lymphocytes (%) (Auto) 17.2L, Monocytes (%) (Auto) 12.1H, Eosinophils (%) (Auto

) 0.1, Basophils (%) (Auto) 1.1, Sodium Level 139, Potassium Level 4.3, 

Chloride Level 107, Carbon Dioxide Level 20L, Anion Gap 12, Blood Urea Nitrogen 

47H, Creatinine 6.6H, Estimat Glomerular Filtration Rate 10.2, Glucose Level 

131H, Calcium Level 8.6, Total Bilirubin 0.2, Aspartate Amino Transf (AST/SGOT) 

24, Alanine Aminotransferase (ALT/SGPT) 41, Alkaline Phosphatase 90, Total 

Protein 7.0, Albumin 2.9L, Globulin 4.1, Albumin/Globulin Ratio 0.7L


Height (Feet):  6


Height (Inches):  2.00


Weight (Pounds):  277


Objective


General Appearance:  WD/WN, alert


Neck:  supple


Cardiovascular:  normal rate


Respiratory/Chest:  chest wall non-tender, lungs clear, normal breath sounds


Abdomen:  normal bowel sounds, non tender, soft, no organomegaly


Neurologic:  CNs II-XII grossly normal, alert, oriented x 3, responsive











Michael Nunez MD Sep 3, 2018 08:48

## 2018-09-04 VITALS — DIASTOLIC BLOOD PRESSURE: 82 MMHG | SYSTOLIC BLOOD PRESSURE: 153 MMHG

## 2018-09-04 VITALS — DIASTOLIC BLOOD PRESSURE: 77 MMHG | SYSTOLIC BLOOD PRESSURE: 152 MMHG

## 2018-09-04 VITALS — SYSTOLIC BLOOD PRESSURE: 161 MMHG | DIASTOLIC BLOOD PRESSURE: 79 MMHG

## 2018-09-04 VITALS — DIASTOLIC BLOOD PRESSURE: 80 MMHG | SYSTOLIC BLOOD PRESSURE: 153 MMHG

## 2018-09-04 VITALS — DIASTOLIC BLOOD PRESSURE: 68 MMHG | SYSTOLIC BLOOD PRESSURE: 135 MMHG

## 2018-09-04 VITALS — SYSTOLIC BLOOD PRESSURE: 151 MMHG | DIASTOLIC BLOOD PRESSURE: 75 MMHG

## 2018-09-04 RX ADMIN — HYDRALAZINE HYDROCHLORIDE SCH MG: 50 TABLET ORAL at 01:12

## 2018-09-04 RX ADMIN — ATORVASTATIN CALCIUM SCH MG: 20 TABLET, FILM COATED ORAL at 20:31

## 2018-09-04 RX ADMIN — OXYMETAZOLINE HYDROCHLORIDE PRN SPRAY: 0.05 SPRAY NASAL at 01:12

## 2018-09-04 RX ADMIN — HEPARIN SODIUM SCH UNITS: 5000 INJECTION INTRAVENOUS; SUBCUTANEOUS at 20:33

## 2018-09-04 RX ADMIN — HYDRALAZINE HYDROCHLORIDE SCH MG: 50 TABLET ORAL at 08:40

## 2018-09-04 RX ADMIN — OXYMETAZOLINE HYDROCHLORIDE PRN SPRAY: 0.05 SPRAY NASAL at 23:17

## 2018-09-04 RX ADMIN — METOPROLOL TARTRATE SCH MG: 50 TABLET, FILM COATED ORAL at 20:31

## 2018-09-04 RX ADMIN — INSULIN ASPART SCH UNITS: 100 INJECTION, SOLUTION INTRAVENOUS; SUBCUTANEOUS at 11:30

## 2018-09-04 RX ADMIN — METOPROLOL TARTRATE SCH MG: 50 TABLET, FILM COATED ORAL at 09:00

## 2018-09-04 RX ADMIN — BENZTROPINE MESYLATE SCH MG: 1 TABLET ORAL at 08:39

## 2018-09-04 RX ADMIN — HYDRALAZINE HYDROCHLORIDE SCH MG: 50 TABLET ORAL at 17:55

## 2018-09-04 RX ADMIN — SODIUM CITRATE AND CITRIC ACID MONOHYDRATE SCH ML: 500; 334 SOLUTION ORAL at 14:11

## 2018-09-04 RX ADMIN — SODIUM CITRATE AND CITRIC ACID MONOHYDRATE SCH ML: 500; 334 SOLUTION ORAL at 17:55

## 2018-09-04 RX ADMIN — DOXAZOSIN MESYLATE SCH MG: 4 TABLET ORAL at 08:39

## 2018-09-04 RX ADMIN — SODIUM CITRATE AND CITRIC ACID MONOHYDRATE SCH ML: 500; 334 SOLUTION ORAL at 08:40

## 2018-09-04 RX ADMIN — INSULIN ASPART SCH UNITS: 100 INJECTION, SOLUTION INTRAVENOUS; SUBCUTANEOUS at 17:53

## 2018-09-04 RX ADMIN — INSULIN ASPART SCH UNITS: 100 INJECTION, SOLUTION INTRAVENOUS; SUBCUTANEOUS at 06:18

## 2018-09-04 RX ADMIN — OXYMETAZOLINE HYDROCHLORIDE PRN SPRAY: 0.05 SPRAY NASAL at 06:18

## 2018-09-04 RX ADMIN — DOXAZOSIN MESYLATE SCH MG: 4 TABLET ORAL at 20:31

## 2018-09-04 RX ADMIN — HEPARIN SODIUM SCH UNITS: 5000 INJECTION INTRAVENOUS; SUBCUTANEOUS at 08:47

## 2018-09-04 RX ADMIN — INSULIN ASPART SCH UNITS: 100 INJECTION, SOLUTION INTRAVENOUS; SUBCUTANEOUS at 20:34

## 2018-09-04 RX ADMIN — ISOSORBIDE MONONITRATE SCH MG: 30 TABLET, EXTENDED RELEASE ORAL at 08:40

## 2018-09-04 RX ADMIN — ASPIRIN 81 MG SCH MG: 81 TABLET ORAL at 08:39

## 2018-09-04 NOTE — PROGRESS NOTE
DATE:  09/03/2018



CARDIOLOGY PROGRESS NOTE



SUBJECTIVE:  No chest pain and less shortness of breath.



OBJECTIVE:

VITAL SIGNS:  Blood pressure 145/75, pulse 67, and respiratory rate 20.

LUNGS:  Clear.

HEART:  Regular rhythm rate.  Normal S1, S2 with a fourth heart sound.

ABDOMEN:  Soft, no edema.



LABORATORY DATA:  White count 9.1, hemoglobin 10.2.  Albumin 2.9.  BUN 47,

creatinine 6.6.



IMPRESSION:

1. Acute myocardial infarction.

2. Severe anemia status post transfusions.

3. Acute on chronic renal failure.

4. Chronic kidney disease 5.



PLAN:

1. Myocardial perfusion scan to follow.

2. Continue optimizing anti-failure and antihypertensive regimen.

3. Placement of AV fistula will be planned following ischemia workup.

4. We will attempt to avoid dialysis using a catheter at this time.









  ______________________________________________

  Edmar Munguia M.D.





[facility]

D:  09/04/2018 01:29

T:  09/04/2018 01:41

JOB#:  [id number]

CC:

## 2018-09-04 NOTE — DIAGNOSTIC IMAGING REPORT
Indications: Chest pain

 

Technique: Single day single isotope protocol utilized. Initially, resting images

obtained using IV administration 11 millicuries 99M technetium Myoview. Subsequently,

patient underwent  lexiscan stress testing. See cardiology report for details. During

Lexiscan infusion, IV administration 32.4 mCi 99 M technetium Myoview. SPECT and

planar images obtained. SPECT images gated to 8 phases of the cardiac cycle were also

obtained, and reformatted into cine images for evaluation of ejection fraction.

 

Comparison: none

 

Findings: Per cardiology report, patient experienced nausea, vomiting, headache. Per

cardiology report, resting EKG demonstrates normal sinus rhythm. Cardiology report

describes absence of ST changes during infusion.  Imaging demonstrates an apparent

perfusion defect in the inferior wall which appears identical on the resting images.

Normal cardiac chamber size. Calculated post stress ejection fraction 63%. No focal

wall motion abnormality

 

Impression: Nonischemic clinical response to pharmacologic stress, per cardiology

report

 

Nonischemic electrocardiographic response to pharmacologic stress, per cardiology

report

 

Apparent inferior wall perfusion defect. Suspected this is artifactual due to soft

tissue attenuation, particularly in view of normal ejection fraction and absence of

wall motion abnormality in that territory. Infarct as etiology of this finding is

possible, however

 

Calculated post stress ejection fraction 63%

## 2018-09-04 NOTE — NEPHROLOGY PROGRESS NOTE
Assessment/Plan


Problem List:  


(1) Nephrotic syndrome


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


(3) Malignant hypertension (arteriolar nephrosclerosis)


(4) Anemia in chronic kidney disease


(5) CKD (chronic kidney disease) stage 5, GFR less than 15 ml/min


(6) CHF (congestive heart failure)


Plan


creat clear 8, 24 hr protein 7.9g heavy  continue bp and cardiac med titration, 

epogen, iron.  Possible av fistula in near future  transfused 8/25 lab a bit 

worse;, add bicitra exam stable check stress test, d/w dr canseco for need to 

start dialysis in near future





Subjective


Constitutional:  Reports: weakness


HEENT:  Reports: no symptoms


Genitourinary:  Reports: no symptoms


Neurologic/Psychiatric:  Reports: no symptoms





Objective


Objective





Last 24 Hour Vital Signs








  Date Time  Temp Pulse Resp B/P (MAP) Pulse Ox O2 Delivery O2 Flow Rate FiO2


 


9/4/18 17:55    153/80    


 


9/4/18 17:55  70  153/80    


 


9/4/18 16:00 98.2 77 20 153/80 (104) 95   





 98.2       


 


9/4/18 16:00  70      


 


9/4/18 12:00  72      


 


9/4/18 12:00 96.8 69 20 151/75 (100) 98   





 96.8       


 


9/4/18 09:00      Room Air  


 


9/4/18 09:00  66  161/79    


 


9/4/18 08:40    161/79    


 


9/4/18 08:40    161/79    


 


9/4/18 08:40  66  161/79    


 


9/4/18 08:00  95      


 


9/4/18 08:00 97.7 66 20 161/79 (106) 97   





 97.7       


 


9/4/18 04:00  71      


 


9/4/18 04:00 97.2 65 25 152/77 (102) 92   





 97.2       


 


9/4/18 01:12    135/68    


 


9/4/18 00:00  59      


 


9/4/18 00:00 97.4 59 24 135/68 (90) 95   





 97.4       


 


9/3/18 21:05  73  142/82    


 


9/3/18 21:00      Room Air  


 


9/3/18 20:00 97.9 72 24 142/82 (102) 94   





 97.9       


 


9/3/18 20:00  66      

















Intake and Output  


 


 9/3/18 9/4/18





 19:00 07:00


 


Intake Total 300 ml 


 


Output Total 275 ml 


 


Balance 25 ml 


 


  


 


Intake Oral 300 ml 


 


Output Urine Total 275 ml 


 


# Voids 4 3


 


# Bowel Movements 2 








Height (Feet):  6


Height (Inches):  2.00


Weight (Pounds):  277


General Appearance:  no apparent distress, alert


EENT:  normal ENT inspection


Neck:  normal alignment


Cardiovascular:  normal rate, regular rhythm


Respiratory/Chest:  lungs clear


Abdomen:  non tender, soft


Extremities:  moderate edema


Neurologic:  CNs II-XII grossly normal











MACKENZIE MAY Sep 4, 2018 19:29

## 2018-09-04 NOTE — GENERAL PROGRESS NOTE
Assessment/Plan


Problem List:  


(1) UTI (urinary tract infection)


ICD Codes:  N39.0 - Urinary tract infection, site not specified


SNOMED:  30977792


(2) Malignant hypertension (arteriolar nephrosclerosis)


ICD Codes:  I12.9 - Hypertensive chronic kidney disease with stage 1 through 

stage 4 chronic kidney disease, or unspecified chronic kidney disease


SNOMED:  78636797


(3) Diabetes


ICD Codes:  E11.9 - Type 2 diabetes mellitus without complications


SNOMED:  97831801


(4) ESRD (end stage renal disease)


ICD Codes:  N18.6 - End stage renal disease


SNOMED:  40976181, 398924987, 090846775


(5) Elevated troponin


ICD Codes:  R74.8 - Abnormal levels of other serum enzymes


SNOMED:  135541868, 748720500, 725266938


(6) Anemia in chronic kidney disease


ICD Codes:  N18.9 - Chronic kidney disease, unspecified; D63.1 - Anemia in 

chronic kidney disease


SNOMED:  384119124, 818301569


(7) CKD (chronic kidney disease) stage 5, GFR less than 15 ml/min


ICD Codes:  N18.5 - Chronic kidney disease, stage 5


SNOMED:  323074608


Status:  stable, progressing


Assessment/Plan


prn o2


resp care


decongestants


monitor labs


transfuse prn


stress test today





Subjective


ROS Limited/Unobtainable:  No


Constitutional:  Reports: malaise, weakness


HEENT:  Reports: no symptoms


Cardiovascular:  Reports: no symptoms


Respiratory:  Reports: no symptoms


Gastrointestinal/Abdominal:  Reports: no symptoms


Genitourinary:  Reports: no symptoms


Neurologic/Psychiatric:  Reports: no symptoms


Endocrine:  Reports: no symptoms


Hematologic/Lymphatic:  Reports: anemia


Allergies:  


Coded Allergies:  


     No Known Allergies (Unverified , 9/25/17)


All Systems:  reviewed and negative except above


Subjective


sob better. normal sats on room air. no chest pain


cards noted. stress test planned for today





Objective





Last 24 Hour Vital Signs








  Date Time  Temp Pulse Resp B/P (MAP) Pulse Ox O2 Delivery O2 Flow Rate FiO2


 


9/4/18 12:00  72      


 


9/4/18 12:00 96.8 69 20 151/75 (100) 98   





 96.8       


 


9/4/18 09:00      Room Air  


 


9/4/18 09:00  66  161/79    


 


9/4/18 08:40    161/79    


 


9/4/18 08:40    161/79    


 


9/4/18 08:40  66  161/79    


 


9/4/18 08:00  95      


 


9/4/18 08:00 97.7 66 20 161/79 (106) 97   





 97.7       


 


9/4/18 04:00  71      


 


9/4/18 04:00 97.2 65 25 152/77 (102) 92   





 97.2       


 


9/4/18 01:12    135/68    


 


9/4/18 00:00  59      


 


9/4/18 00:00 97.4 59 24 135/68 (90) 95   





 97.4       


 


9/3/18 21:05  73  142/82    


 


9/3/18 21:00      Room Air  


 


9/3/18 20:00 97.9 72 24 142/82 (102) 94   





 97.9       


 


9/3/18 20:00  66      


 


9/3/18 17:25    158/80    


 


9/3/18 17:25  60  158/80    

















Intake and Output  


 


 9/3/18 9/4/18





 19:00 07:00


 


Intake Total 300 ml 


 


Output Total 275 ml 


 


Balance 25 ml 


 


  


 


Intake Oral 300 ml 


 


Output Urine Total 275 ml 


 


# Voids 4 3


 


# Bowel Movements 2 








Height (Feet):  6


Height (Inches):  2.00


Weight (Pounds):  277


Objective


General Appearance:  WD/WN, alert


Neck:  supple


Cardiovascular:  normal rate


Respiratory/Chest:  chest wall non-tender, lungs clear, normal breath sounds


Abdomen:  normal bowel sounds, non tender, soft, no organomegaly


Neurologic:  CNs II-XII grossly normal, alert, oriented x 3, responsive











Michael Nunez MD Sep 4, 2018 16:12

## 2018-09-05 VITALS — DIASTOLIC BLOOD PRESSURE: 73 MMHG | SYSTOLIC BLOOD PRESSURE: 149 MMHG

## 2018-09-05 VITALS — DIASTOLIC BLOOD PRESSURE: 76 MMHG | SYSTOLIC BLOOD PRESSURE: 147 MMHG

## 2018-09-05 VITALS — SYSTOLIC BLOOD PRESSURE: 149 MMHG | DIASTOLIC BLOOD PRESSURE: 71 MMHG

## 2018-09-05 VITALS — SYSTOLIC BLOOD PRESSURE: 152 MMHG | DIASTOLIC BLOOD PRESSURE: 89 MMHG

## 2018-09-05 VITALS — SYSTOLIC BLOOD PRESSURE: 142 MMHG | DIASTOLIC BLOOD PRESSURE: 89 MMHG

## 2018-09-05 VITALS — SYSTOLIC BLOOD PRESSURE: 149 MMHG | DIASTOLIC BLOOD PRESSURE: 76 MMHG

## 2018-09-05 LAB
ADD MANUAL DIFF: NO
ANION GAP SERPL CALC-SCNC: 12 MMOL/L (ref 5–15)
BASOPHILS NFR BLD AUTO: 1.1 % (ref 0–2)
BUN SERPL-MCNC: 47 MG/DL (ref 7–18)
CALCIUM SERPL-MCNC: 8.2 MG/DL (ref 8.5–10.1)
CHLORIDE SERPL-SCNC: 107 MMOL/L (ref 98–107)
CO2 SERPL-SCNC: 21 MMOL/L (ref 21–32)
CREAT SERPL-MCNC: 6.8 MG/DL (ref 0.55–1.3)
EOSINOPHIL NFR BLD AUTO: 2.1 % (ref 0–3)
ERYTHROCYTE [DISTWIDTH] IN BLOOD BY AUTOMATED COUNT: 17.8 % (ref 11.6–14.8)
HCT VFR BLD CALC: 29.9 % (ref 42–52)
HGB BLD-MCNC: 9.5 G/DL (ref 14.2–18)
LYMPHOCYTES NFR BLD AUTO: 17.4 % (ref 20–45)
MCV RBC AUTO: 95 FL (ref 80–99)
MONOCYTES NFR BLD AUTO: 12.9 % (ref 1–10)
NEUTROPHILS NFR BLD AUTO: 66.5 % (ref 45–75)
PLATELET # BLD: 234 K/UL (ref 150–450)
POTASSIUM SERPL-SCNC: 4.1 MMOL/L (ref 3.5–5.1)
RBC # BLD AUTO: 3.14 M/UL (ref 4.7–6.1)
SODIUM SERPL-SCNC: 140 MMOL/L (ref 136–145)
WBC # BLD AUTO: 7.8 K/UL (ref 4.8–10.8)

## 2018-09-05 RX ADMIN — INSULIN ASPART SCH UNITS: 100 INJECTION, SOLUTION INTRAVENOUS; SUBCUTANEOUS at 21:55

## 2018-09-05 RX ADMIN — ATORVASTATIN CALCIUM SCH MG: 20 TABLET, FILM COATED ORAL at 21:52

## 2018-09-05 RX ADMIN — METOPROLOL TARTRATE SCH MG: 50 TABLET, FILM COATED ORAL at 21:52

## 2018-09-05 RX ADMIN — ASPIRIN 81 MG SCH MG: 81 TABLET ORAL at 08:06

## 2018-09-05 RX ADMIN — METOPROLOL TARTRATE SCH MG: 50 TABLET, FILM COATED ORAL at 08:07

## 2018-09-05 RX ADMIN — ERYTHROPOIETIN SCH UNITS: 20000 INJECTION, SOLUTION INTRAVENOUS; SUBCUTANEOUS at 21:51

## 2018-09-05 RX ADMIN — SODIUM CITRATE AND CITRIC ACID MONOHYDRATE SCH ML: 500; 334 SOLUTION ORAL at 13:44

## 2018-09-05 RX ADMIN — INSULIN ASPART SCH UNITS: 100 INJECTION, SOLUTION INTRAVENOUS; SUBCUTANEOUS at 11:28

## 2018-09-05 RX ADMIN — HYDRALAZINE HYDROCHLORIDE SCH MG: 50 TABLET ORAL at 17:24

## 2018-09-05 RX ADMIN — HEPARIN SODIUM SCH UNITS: 5000 INJECTION INTRAVENOUS; SUBCUTANEOUS at 08:10

## 2018-09-05 RX ADMIN — INSULIN ASPART SCH UNITS: 100 INJECTION, SOLUTION INTRAVENOUS; SUBCUTANEOUS at 16:23

## 2018-09-05 RX ADMIN — BENZTROPINE MESYLATE SCH MG: 1 TABLET ORAL at 08:06

## 2018-09-05 RX ADMIN — HYDRALAZINE HYDROCHLORIDE SCH MG: 50 TABLET ORAL at 09:34

## 2018-09-05 RX ADMIN — SODIUM CITRATE AND CITRIC ACID MONOHYDRATE SCH ML: 500; 334 SOLUTION ORAL at 08:07

## 2018-09-05 RX ADMIN — INSULIN ASPART SCH UNITS: 100 INJECTION, SOLUTION INTRAVENOUS; SUBCUTANEOUS at 06:12

## 2018-09-05 RX ADMIN — DOXAZOSIN MESYLATE SCH MG: 4 TABLET ORAL at 08:07

## 2018-09-05 RX ADMIN — ISOSORBIDE MONONITRATE SCH MG: 30 TABLET, EXTENDED RELEASE ORAL at 08:06

## 2018-09-05 RX ADMIN — DOXAZOSIN MESYLATE SCH MG: 4 TABLET ORAL at 21:52

## 2018-09-05 RX ADMIN — HEPARIN SODIUM SCH UNITS: 5000 INJECTION INTRAVENOUS; SUBCUTANEOUS at 21:00

## 2018-09-05 RX ADMIN — HYDRALAZINE HYDROCHLORIDE SCH MG: 50 TABLET ORAL at 02:19

## 2018-09-05 NOTE — NEPHROLOGY PROGRESS NOTE
Assessment/Plan


Problem List:  


(1) Nephrotic syndrome


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


(3) Malignant hypertension (arteriolar nephrosclerosis)


(4) Anemia in chronic kidney disease


(5) CKD (chronic kidney disease) stage 5, GFR less than 15 ml/min


(6) CHF (congestive heart failure)


Plan


creat clear 8, 24 hr protein 7.9g heavy  continue bp and cardiac med titration, 

epogen, iron.  Possible av fistula in near future  transfused 8/25 lab a bit 

worse;, add bicitra exam stable check stress test, d/w dr canseco for need to 

start dialysis in near future, permcath and hd 9/6





Subjective


Constitutional:  Reports: weakness


HEENT:  Reports: no symptoms


Genitourinary:  Reports: no symptoms


Neurologic/Psychiatric:  Reports: no symptoms





Objective


Objective





Last 24 Hour Vital Signs








  Date Time  Temp Pulse Resp B/P (MAP) Pulse Ox O2 Delivery O2 Flow Rate FiO2


 


9/5/18 12:00 97.6 60 20 147/76 (99) 95   





 97.6       


 


9/5/18 11:58  50      


 


9/5/18 09:34    149/76    


 


9/5/18 08:07  76  149/76    


 


9/5/18 08:06    149/76    


 


9/5/18 08:06  76  149/76    


 


9/5/18 07:45 97.6 76 20 149/76 (100) 95   





 97.6       


 


9/5/18 07:43  67      


 


9/5/18 07:27      Nasal Cannula 2.0 


 


9/5/18 04:00 98.2 89 20 149/71 (97) 95   





 98.2       


 


9/5/18 04:00  62      


 


9/5/18 02:19    152/89    


 


9/5/18 00:00  64      


 


9/5/18 00:00 98.0 76 19 152/89 (110) 92   





 98.0       


 


9/4/18 21:00      Nasal Cannula 2.0 


 


9/4/18 20:31  77  153/82    


 


9/4/18 20:00  89      


 


9/4/18 20:00 97.9 77 22 153/82 (105) 94   





 97.9       


 


9/4/18 17:55    153/80    


 


9/4/18 17:55  70  153/80    

















Intake and Output  


 


 9/4/18 9/5/18





 19:00 07:00


 


Intake Total 300 ml 


 


Balance 300 ml 


 


  


 


Intake Oral 300 ml 


 


# Voids 2 2








Laboratory Tests


9/5/18 07:00: 


White Blood Count 7.8, Red Blood Count 3.14L, Hemoglobin 9.5L, Hematocrit 29.9L

, Mean Corpuscular Volume 95, Mean Corpuscular Hemoglobin 30.2, Mean 

Corpuscular Hemoglobin Concent 31.8L, Red Cell Distribution Width 17.8H, 

Platelet Count 234, Mean Platelet Volume 6.7, Neutrophils (%) (Auto) 66.5, 

Lymphocytes (%) (Auto) 17.4L, Monocytes (%) (Auto) 12.9H, Eosinophils (%) (Auto

) 2.1, Basophils (%) (Auto) 1.1, Sodium Level 140, Potassium Level 4.1, 

Chloride Level 107, Carbon Dioxide Level 21, Anion Gap 12, Blood Urea Nitrogen 

47H, Creatinine 6.8H, Estimat Glomerular Filtration Rate 9.8, Glucose Level 124H

, Calcium Level 8.2L


Height (Feet):  6


Height (Inches):  2.00


Weight (Pounds):  275


General Appearance:  alert


EENT:  normal ENT inspection


Neck:  normal alignment


Cardiovascular:  normal peripheral pulses, normal rate, regular rhythm


Respiratory/Chest:  expiratory wheezing


Abdomen:  non tender, soft, no organomegaly


Extremities:  moderate edema


Neurologic:  CNs II-XII grossly normal











MACKENZIE MAY Sep 5, 2018 16:31

## 2018-09-05 NOTE — GENERAL PROGRESS NOTE
Assessment/Plan


Problem List:  


(1) UTI (urinary tract infection)


ICD Codes:  N39.0 - Urinary tract infection, site not specified


SNOMED:  74746537


(2) Malignant hypertension (arteriolar nephrosclerosis)


ICD Codes:  I12.9 - Hypertensive chronic kidney disease with stage 1 through 

stage 4 chronic kidney disease, or unspecified chronic kidney disease


SNOMED:  90637445


(3) Diabetes


ICD Codes:  E11.9 - Type 2 diabetes mellitus without complications


SNOMED:  75231199


(4) ESRD (end stage renal disease)


ICD Codes:  N18.6 - End stage renal disease


SNOMED:  89174007, 373680551, 575719604


(5) Elevated troponin


ICD Codes:  R74.8 - Abnormal levels of other serum enzymes


SNOMED:  308197676, 371503957, 915320993


(6) Anemia in chronic kidney disease


ICD Codes:  N18.9 - Chronic kidney disease, unspecified; D63.1 - Anemia in 

chronic kidney disease


SNOMED:  712254201, 725937445


(7) CKD (chronic kidney disease) stage 5, GFR less than 15 ml/min


ICD Codes:  N18.5 - Chronic kidney disease, stage 5


SNOMED:  828824935


Status:  stable


Assessment/Plan


dc planning today





Subjective


ROS Limited/Unobtainable:  No


Constitutional:  Reports: malaise, weakness


HEENT:  Reports: no symptoms


Cardiovascular:  Reports: no symptoms


Respiratory:  Reports: cough


Gastrointestinal/Abdominal:  Reports: no symptoms


Genitourinary:  Reports: no symptoms


Neurologic/Psychiatric:  Reports: no symptoms


Endocrine:  Reports: no symptoms


Hematologic/Lymphatic:  Reports: anemia


Allergies:  


Coded Allergies:  


     No Known Allergies (Unverified , 9/25/17)


All Systems:  reviewed and negative except above


Subjective


cards noted. stress test normal. pt w/o complaints. no sob. wants to go back to 

apt/senior housing





Objective





Last 24 Hour Vital Signs








  Date Time  Temp Pulse Resp B/P (MAP) Pulse Ox O2 Delivery O2 Flow Rate FiO2


 


9/5/18 08:07  76  149/76    


 


9/5/18 08:06    149/76    


 


9/5/18 08:06  76  149/76    


 


9/5/18 07:45 97.6 76 20 149/76 (100) 95   





 97.6       


 


9/5/18 07:27      Nasal Cannula 2.0 


 


9/5/18 04:00 98.2 89 20 149/71 (97) 95   





 98.2       


 


9/5/18 04:00  62      


 


9/5/18 02:19    152/89    


 


9/5/18 00:00  64      


 


9/5/18 00:00 98.0 76 19 152/89 (110) 92   





 98.0       


 


9/4/18 21:00      Nasal Cannula 2.0 


 


9/4/18 20:31  77  153/82    


 


9/4/18 20:00  89      


 


9/4/18 20:00 97.9 77 22 153/82 (105) 94   





 97.9       


 


9/4/18 17:55    153/80    


 


9/4/18 17:55  70  153/80    


 


9/4/18 16:00 98.2 77 20 153/80 (104) 95   





 98.2       


 


9/4/18 16:00  70      


 


9/4/18 12:00  72      


 


9/4/18 12:00 96.8 69 20 151/75 (100) 98   





 96.8       


 


9/4/18 09:00      Room Air  


 


9/4/18 09:00  66  161/79    


 


9/4/18 08:40    161/79    


 


9/4/18 08:40    161/79    


 


9/4/18 08:40  66  161/79    

















Intake and Output  


 


 9/4/18 9/5/18





 19:00 07:00


 


Intake Total 300 ml 


 


Balance 300 ml 


 


  


 


Intake Oral 300 ml 


 


# Voids 2 2








Laboratory Tests


9/5/18 07:00: 


White Blood Count 7.8, Red Blood Count 3.14L, Hemoglobin 9.5L, Hematocrit 29.9L

, Mean Corpuscular Volume 95, Mean Corpuscular Hemoglobin 30.2, Mean 

Corpuscular Hemoglobin Concent 31.8L, Red Cell Distribution Width 17.8H, 

Platelet Count 234, Mean Platelet Volume 6.7, Neutrophils (%) (Auto) 66.5, 

Lymphocytes (%) (Auto) 17.4L, Monocytes (%) (Auto) 12.9H, Eosinophils (%) (Auto

) 2.1, Basophils (%) (Auto) 1.1, Sodium Level 140, Potassium Level 4.1, 

Chloride Level 107, Carbon Dioxide Level 21, Anion Gap 12, Blood Urea Nitrogen 

47H, Creatinine 6.8H, Estimat Glomerular Filtration Rate 9.8, Glucose Level 124H

, Calcium Level 8.2L


Height (Feet):  6


Height (Inches):  2.00


Weight (Pounds):  275


General Appearance:  WD/WN


Neck:  supple


Cardiovascular:  regular rhythm


Respiratory/Chest:  chest wall non-tender, lungs clear, normal breath sounds


Abdomen:  normal bowel sounds, non tender, soft, no organomegaly


Edema:  no edema noted Arm (L), no edema noted Arm (R), no edema noted Leg (L), 

no edema noted Leg (R), no edema noted Pedal (L), no edema noted Pedal (R), no 

edema noted Generalized


Edema:  trace edema


Neurologic:  CNs II-XII grossly normal, no motor/sensory deficits, alert, 

oriented x 3, responsive


Skin:  normal pigmentation


Lymphatic:  normal anterior cervical (L), normal anterior cervical (R), normal 

posterior cervical (L), normal posterior cervical (R), normal submandibular (L)

, normal submandibular (R), normal supraclavicular (L), normal supraclavicular (

R), normal axillary (L), normal axillary (R), normal inguinal (L), normal 

inguinal (R), normal other











Michael Nunez MD Sep 5, 2018 08:31

## 2018-09-06 VITALS — SYSTOLIC BLOOD PRESSURE: 157 MMHG | DIASTOLIC BLOOD PRESSURE: 91 MMHG

## 2018-09-06 VITALS — SYSTOLIC BLOOD PRESSURE: 146 MMHG | DIASTOLIC BLOOD PRESSURE: 81 MMHG

## 2018-09-06 VITALS — DIASTOLIC BLOOD PRESSURE: 93 MMHG | SYSTOLIC BLOOD PRESSURE: 160 MMHG

## 2018-09-06 VITALS — SYSTOLIC BLOOD PRESSURE: 165 MMHG | DIASTOLIC BLOOD PRESSURE: 81 MMHG

## 2018-09-06 VITALS — DIASTOLIC BLOOD PRESSURE: 91 MMHG | SYSTOLIC BLOOD PRESSURE: 163 MMHG

## 2018-09-06 VITALS — SYSTOLIC BLOOD PRESSURE: 157 MMHG | DIASTOLIC BLOOD PRESSURE: 80 MMHG

## 2018-09-06 VITALS — SYSTOLIC BLOOD PRESSURE: 161 MMHG | DIASTOLIC BLOOD PRESSURE: 81 MMHG

## 2018-09-06 VITALS — DIASTOLIC BLOOD PRESSURE: 75 MMHG | SYSTOLIC BLOOD PRESSURE: 151 MMHG

## 2018-09-06 VITALS — DIASTOLIC BLOOD PRESSURE: 85 MMHG | SYSTOLIC BLOOD PRESSURE: 141 MMHG

## 2018-09-06 VITALS — SYSTOLIC BLOOD PRESSURE: 159 MMHG | DIASTOLIC BLOOD PRESSURE: 89 MMHG

## 2018-09-06 VITALS — DIASTOLIC BLOOD PRESSURE: 90 MMHG | SYSTOLIC BLOOD PRESSURE: 167 MMHG

## 2018-09-06 VITALS — DIASTOLIC BLOOD PRESSURE: 83 MMHG | SYSTOLIC BLOOD PRESSURE: 157 MMHG

## 2018-09-06 VITALS — SYSTOLIC BLOOD PRESSURE: 145 MMHG | DIASTOLIC BLOOD PRESSURE: 89 MMHG

## 2018-09-06 VITALS — DIASTOLIC BLOOD PRESSURE: 92 MMHG | SYSTOLIC BLOOD PRESSURE: 162 MMHG

## 2018-09-06 VITALS — DIASTOLIC BLOOD PRESSURE: 84 MMHG | SYSTOLIC BLOOD PRESSURE: 151 MMHG

## 2018-09-06 VITALS — SYSTOLIC BLOOD PRESSURE: 156 MMHG | DIASTOLIC BLOOD PRESSURE: 84 MMHG

## 2018-09-06 VITALS — SYSTOLIC BLOOD PRESSURE: 149 MMHG | DIASTOLIC BLOOD PRESSURE: 75 MMHG

## 2018-09-06 VITALS — SYSTOLIC BLOOD PRESSURE: 169 MMHG | DIASTOLIC BLOOD PRESSURE: 87 MMHG

## 2018-09-06 VITALS — DIASTOLIC BLOOD PRESSURE: 90 MMHG | SYSTOLIC BLOOD PRESSURE: 142 MMHG

## 2018-09-06 VITALS — SYSTOLIC BLOOD PRESSURE: 157 MMHG | DIASTOLIC BLOOD PRESSURE: 83 MMHG

## 2018-09-06 VITALS — DIASTOLIC BLOOD PRESSURE: 84 MMHG | SYSTOLIC BLOOD PRESSURE: 155 MMHG

## 2018-09-06 VITALS — SYSTOLIC BLOOD PRESSURE: 170 MMHG | DIASTOLIC BLOOD PRESSURE: 90 MMHG

## 2018-09-06 LAB
ADD MANUAL DIFF: NO
ADD MANUAL DIFF: NO
ANION GAP SERPL CALC-SCNC: 10 MMOL/L (ref 5–15)
ANION GAP SERPL CALC-SCNC: 11 MMOL/L (ref 5–15)
APTT BLD: 28 SEC (ref 23–33)
BASOPHILS NFR BLD AUTO: 1.1 % (ref 0–2)
BASOPHILS NFR BLD AUTO: 1.7 % (ref 0–2)
BUN SERPL-MCNC: 46 MG/DL (ref 7–18)
BUN SERPL-MCNC: 48 MG/DL (ref 7–18)
CALCIUM SERPL-MCNC: 8.4 MG/DL (ref 8.5–10.1)
CALCIUM SERPL-MCNC: 8.7 MG/DL (ref 8.5–10.1)
CHLORIDE SERPL-SCNC: 107 MMOL/L (ref 98–107)
CHLORIDE SERPL-SCNC: 108 MMOL/L (ref 98–107)
CO2 SERPL-SCNC: 22 MMOL/L (ref 21–32)
CO2 SERPL-SCNC: 22 MMOL/L (ref 21–32)
CREAT SERPL-MCNC: 6.7 MG/DL (ref 0.55–1.3)
CREAT SERPL-MCNC: 6.8 MG/DL (ref 0.55–1.3)
EOSINOPHIL NFR BLD AUTO: 3.5 % (ref 0–3)
EOSINOPHIL NFR BLD AUTO: 3.7 % (ref 0–3)
ERYTHROCYTE [DISTWIDTH] IN BLOOD BY AUTOMATED COUNT: 17.8 % (ref 11.6–14.8)
ERYTHROCYTE [DISTWIDTH] IN BLOOD BY AUTOMATED COUNT: 17.8 % (ref 11.6–14.8)
HCT VFR BLD CALC: 29.8 % (ref 42–52)
HCT VFR BLD CALC: 29.8 % (ref 42–52)
HGB BLD-MCNC: 9.7 G/DL (ref 14.2–18)
HGB BLD-MCNC: 9.9 G/DL (ref 14.2–18)
INR PPP: 1.1 (ref 0.9–1.1)
LYMPHOCYTES NFR BLD AUTO: 12.5 % (ref 20–45)
LYMPHOCYTES NFR BLD AUTO: 14 % (ref 20–45)
MCV RBC AUTO: 94 FL (ref 80–99)
MCV RBC AUTO: 95 FL (ref 80–99)
MONOCYTES NFR BLD AUTO: 11.2 % (ref 1–10)
MONOCYTES NFR BLD AUTO: 11.7 % (ref 1–10)
NEUTROPHILS NFR BLD AUTO: 69.6 % (ref 45–75)
NEUTROPHILS NFR BLD AUTO: 71 % (ref 45–75)
PLATELET # BLD: 241 K/UL (ref 150–450)
PLATELET # BLD: 267 K/UL (ref 150–450)
POTASSIUM SERPL-SCNC: 4.3 MMOL/L (ref 3.5–5.1)
POTASSIUM SERPL-SCNC: 4.4 MMOL/L (ref 3.5–5.1)
RBC # BLD AUTO: 3.15 M/UL (ref 4.7–6.1)
RBC # BLD AUTO: 3.18 M/UL (ref 4.7–6.1)
SODIUM SERPL-SCNC: 139 MMOL/L (ref 136–145)
SODIUM SERPL-SCNC: 141 MMOL/L (ref 136–145)
WBC # BLD AUTO: 8.2 K/UL (ref 4.8–10.8)
WBC # BLD AUTO: 8.3 K/UL (ref 4.8–10.8)

## 2018-09-06 PROCEDURE — 0JH63XZ INSERTION OF TUNNELED VASCULAR ACCESS DEVICE INTO CHEST SUBCUTANEOUS TISSUE AND FASCIA, PERCUTANEOUS APPROACH: ICD-10-PCS

## 2018-09-06 PROCEDURE — 5A1D70Z PERFORMANCE OF URINARY FILTRATION, INTERMITTENT, LESS THAN 6 HOURS PER DAY: ICD-10-PCS

## 2018-09-06 PROCEDURE — 05HM33Z INSERTION OF INFUSION DEVICE INTO RIGHT INTERNAL JUGULAR VEIN, PERCUTANEOUS APPROACH: ICD-10-PCS

## 2018-09-06 PROCEDURE — B513ZZA FLUOROSCOPY OF RIGHT JUGULAR VEINS, GUIDANCE: ICD-10-PCS

## 2018-09-06 RX ADMIN — ISOSORBIDE MONONITRATE SCH MG: 30 TABLET, EXTENDED RELEASE ORAL at 08:34

## 2018-09-06 RX ADMIN — HEPARIN SODIUM SCH UNITS: 5000 INJECTION INTRAVENOUS; SUBCUTANEOUS at 08:34

## 2018-09-06 RX ADMIN — HYDRALAZINE HYDROCHLORIDE SCH MG: 50 TABLET ORAL at 02:13

## 2018-09-06 RX ADMIN — DOXAZOSIN MESYLATE SCH MG: 4 TABLET ORAL at 22:48

## 2018-09-06 RX ADMIN — Medication SCH TAB: at 15:20

## 2018-09-06 RX ADMIN — METOPROLOL TARTRATE SCH MG: 50 TABLET, FILM COATED ORAL at 08:35

## 2018-09-06 RX ADMIN — BENZTROPINE MESYLATE SCH MG: 1 TABLET ORAL at 08:33

## 2018-09-06 RX ADMIN — INSULIN ASPART SCH UNITS: 100 INJECTION, SOLUTION INTRAVENOUS; SUBCUTANEOUS at 05:57

## 2018-09-06 RX ADMIN — ATORVASTATIN CALCIUM SCH MG: 20 TABLET, FILM COATED ORAL at 22:48

## 2018-09-06 RX ADMIN — ASPIRIN 81 MG SCH MG: 81 TABLET ORAL at 08:35

## 2018-09-06 RX ADMIN — OXYMETAZOLINE HYDROCHLORIDE PRN SPRAY: 0.05 SPRAY NASAL at 02:13

## 2018-09-06 RX ADMIN — INSULIN ASPART SCH UNITS: 100 INJECTION, SOLUTION INTRAVENOUS; SUBCUTANEOUS at 11:30

## 2018-09-06 RX ADMIN — INSULIN ASPART SCH UNITS: 100 INJECTION, SOLUTION INTRAVENOUS; SUBCUTANEOUS at 18:02

## 2018-09-06 RX ADMIN — DOXAZOSIN MESYLATE SCH MG: 4 TABLET ORAL at 08:33

## 2018-09-06 RX ADMIN — HYDRALAZINE HYDROCHLORIDE SCH MG: 50 TABLET ORAL at 18:00

## 2018-09-06 RX ADMIN — HYDRALAZINE HYDROCHLORIDE SCH MG: 50 TABLET ORAL at 10:29

## 2018-09-06 RX ADMIN — OXYMETAZOLINE HYDROCHLORIDE PRN SPRAY: 0.05 SPRAY NASAL at 08:38

## 2018-09-06 NOTE — DIAGNOSTIC IMAGING REPORT
Indication: Patient requires long-term hemodialysis.

 

Findings: After the indications, procedure, risks, complications, and alternatives of

the procedure were explained, written informed consent was obtained. 

 

Patient was brought to the angio-fluoroscopic suite and placed supine on the table.

All elements of maximum sterile barrier technique were followed including use of a

cap and mask, sterile gown, sterile gloves, and a large sterile sheet.  Alcohol used

to prep the skin. 1% lidocaine was used to anesthetize the skin and subcutaneous

tissue. Sonographic evaluation of the the right   jugular vein was performed

demonstrating a patent and compressible vein.  Access using an 18 gauge needle was

obtained under real-time ultrasound guidance and digital image was saved in archive. 

An 035 wire was then advanced into the vein and negotiated fluoroscopically into the

inferior vena cava. 

 

Lidocaine infiltration of the right anterior chest wall was then performed followed

by dermatotomy. A tunnel of lidocaine was then made between this site and the venous

puncture site. A 14.5 Sami 23 cm dual-lumen catheter was then tunneled through the

tract.  The wire within the jugular vein was then exchanged for a dilator. Serial

dilatations were performed. The catheter was then inserted into the last

dilator/peel-away sheath such that the tip resides in the SVC. The dilator/peel-away

sheath and wire were removed and the catheter was completely buried under the skin.

Proximal portion of the catheter was secured to the skin using 2-0 Prolene suture.

Both ports aspirate and flush easily.  Both dermatotomy sites were closed with

Dermabond.

 

Total fluoroscopic time 0.8 minutes

 

Impression: Successful placement of tunneled  right jugular hemodialysis catheter. 

No complications.

## 2018-09-06 NOTE — PROGRESS NOTE
DATE:  09/05/2018



CARDIOLOGY PROGRESS NOTE



SUBJECTIVE:  The patient has worsening shortness of breath and edema today.

The case was discussed with his sister who is his durable

power-of- as well as the consulting staff.  The patient does not

improve adequately with aggressive medical therapy and we cannot defer

dialysis and ultrafiltration any longer.



OBJECTIVE:

VITAL SIGNS:  Blood pressure 149/73, pulse 60, respiratory rate 20,

afebrile.

LUNG:  Bilateral rales.

HEART:  Regular rhythm and rate.  Normal S1 and S2 with a 1/6 systolic

apical murmur.

ABDOMEN:  Soft.

EXTREMITIES:  A 2+ to 3+ dependent lower extremity edema.



LABORATORY DATA:  White count 7.8, hemoglobin 9.5.  BUN 47 and creatinine

6.8.  Albumin 2.9.



IMPRESSION:

1. Acute myocardial infarction.  No reversible ischemia.

2. Anemia due to chronic kidney disease.

3. Acute on chronic renal failure.

4. Acute on chronic diastolic congestive heart failure.

5. Hypertensive heart disease with labile blood pressure.



PLAN:

1. Additional diuresis.

2. PermCath for hemodialysis and ultrafiltration.

3. Ultimately will have AV fistula created for long-term dialysis.

4. Anti-failure and anti-anginal regimen will be continuously titrated

especially following initiation of dialysis and ultrafiltration.









  ______________________________________________

  Edmar Munguia M.D.





DR:  Carl

D:  09/05/2018 23:32

T:  09/06/2018 01:33

JOB#:  [id number]

CC:

## 2018-09-06 NOTE — PRE-PROCEDURE NOTE/ATTESTATION
Pre-Procedure Note/Attestation


Complete Prior to Procedure


Planned Procedure:  right


Procedure Narrative:


permacath placement right IJ





Indications for Procedure


Pre-Operative Diagnosis:


requires hemodialysis longterm





Attestation


I attest that I discussed the nature of the procedure; its benefits; risks and 

complications; and alternatives (and the risks and benefits of such alternatives

), prior to the procedure, with the patient (or the patient's legal 

representative).





I attest that, if there was a reasonable possibility of needing a blood 

transfusion, the patient (or the patient's legal representative) was given the 

[facility] standardized written summary, pursuant 

to the [place] [employer] Blood Safety Act ([place] Health and Safety Code # 1645, as 

amended).





I attest that I re-evaluated the patient just prior to the surgery and that 

there has been no change in the patient's H&P, except as documented below:











Jian Willson MD Sep 6, 2018 14:01

## 2018-09-06 NOTE — GENERAL PROGRESS NOTE
Progress Note


Progress Note


All  noted; Permcath placed earlier today.


NAD; AVSS


Permcath site C&D





-- HD per renal


-- will schedule for AVF once clinically optimized and cleared for OR.











Freeman Shafer MD Sep 6, 2018 15:53

## 2018-09-06 NOTE — NEPHROLOGY PROGRESS NOTE
Assessment/Plan


Problem List:  


(1) ESRD (end stage renal disease)


(2) Elevated troponin


(3) Nephrotic syndrome


(4) CHF (congestive heart failure)


Plan


permcath today


HD today


Discussed with RN


cont Epogen


add Nephrovite


check PTH as outpt





Subjective


Subjective


feels ok





Objective


Objective





Last 24 Hour Vital Signs








  Date Time  Temp Pulse Resp B/P (MAP) Pulse Ox O2 Delivery O2 Flow Rate FiO2


 


9/6/18 10:29    161/80    


 


9/6/18 09:00      Room Air  


 


9/6/18 08:35  71  157/83    


 


9/6/18 08:34    157/83    


 


9/6/18 08:34  71  157/83    


 


9/6/18 08:00  63      


 


9/6/18 08:00 97.8 71 20 157/83 (107) 96   





 97.8       


 


9/6/18 04:00 98.2 63 22 151/84 (106) 95   





 98.2       


 


9/6/18 04:00  63      


 


9/6/18 02:13    149/75    


 


9/6/18 00:00 97.9 65 22 149/75 (99) 94   





 97.9       


 


9/6/18 00:00  66      


 


9/5/18 21:52  70  138/62    


 


9/5/18 21:00      Room Air  


 


9/5/18 20:00  60      


 


9/5/18 20:00 98.0 98 22 142/89 (106) 93   





 98.0       


 


9/5/18 17:24    149/73    


 


9/5/18 17:24  60  149/73    


 


9/5/18 17:04 97.6 60 20 149/73 (98) 95   





 97.6       


 


9/5/18 15:47  53      

















Intake and Output  


 


 9/5/18 9/6/18





 19:00 07:00


 


Intake Total 360 ml 


 


Balance 360 ml 


 


  


 


Intake Oral 360 ml 


 


# Voids  2








Laboratory Tests


9/6/18 05:40: 


White Blood Count [Pending], Red Blood Count [Pending], Hemoglobin [Pending], 

Hematocrit [Pending], Mean Corpuscular Volume [Pending], Mean Corpuscular 

Hemoglobin [Pending], Mean Corpuscular Hemoglobin Concent [Pending], Red Cell 

Distribution Width [Pending], Platelet Count [Pending], Mean Platelet Volume [

Pending], Neutrophils (%) (Auto) [Pending], Lymphocytes (%) (Auto) [Pending], 

Monocytes (%) (Auto) [Pending], Eosinophils (%) (Auto) [Pending], Basophils (%) 

(Auto) [Pending], Sodium Level [Pending], Potassium Level [Pending], Chloride 

Level [Pending], Carbon Dioxide Level [Pending], Blood Urea Nitrogen [Pending], 

Creatinine [Pending], Estimat Glomerular Filtration Rate [Pending], Glucose 

Level [Pending], Calcium Level [Pending]


9/6/18 05:50: 


White Blood Count 8.2, Red Blood Count 3.15L, Hemoglobin 9.7L, Hematocrit 29.8L

, Mean Corpuscular Volume 95, Mean Corpuscular Hemoglobin 30.6, Mean 

Corpuscular Hemoglobin Concent 32.4, Red Cell Distribution Width 17.8H, 

Platelet Count 241, Mean Platelet Volume 6.5, Neutrophils (%) (Auto) 71.0, 

Lymphocytes (%) (Auto) 12.5L, Monocytes (%) (Auto) 11.7H, Eosinophils (%) (Auto

) 3.7H, Basophils (%) (Auto) 1.1, Sodium Level 139, Potassium Level 4.4, 

Chloride Level 107, Carbon Dioxide Level 22, Blood Urea Nitrogen 46H, 

Creatinine 6.7H, Estimat Glomerular Filtration Rate 9.9, Glucose Level 150H, 

Calcium Level 8.4L, Prothrombin Time 11.7H, Prothromb Time International Ratio 

1.1, Activated Partial Thromboplast Time 28, Anion Gap 10


Height (Feet):  6


Height (Inches):  2.00


Weight (Pounds):  275


Cardiovascular:  normal rate


Respiratory/Chest:  lungs clear


Extremities:  trace edema











Jude Haley MD Sep 6, 2018 12:29

## 2018-09-06 NOTE — GENERAL PROGRESS NOTE
Assessment/Plan


Problem List:  


(1) UTI (urinary tract infection)


ICD Codes:  N39.0 - Urinary tract infection, site not specified


SNOMED:  82998199


(2) Malignant hypertension (arteriolar nephrosclerosis)


ICD Codes:  I12.9 - Hypertensive chronic kidney disease with stage 1 through 

stage 4 chronic kidney disease, or unspecified chronic kidney disease


SNOMED:  21298294


(3) Diabetes


ICD Codes:  E11.9 - Type 2 diabetes mellitus without complications


SNOMED:  66080446


(4) ESRD (end stage renal disease)


ICD Codes:  N18.6 - End stage renal disease


SNOMED:  43471280, 095901217, 157218207


(5) Elevated troponin


ICD Codes:  R74.8 - Abnormal levels of other serum enzymes


SNOMED:  266059993, 254563355, 184080021


(6) Anemia in chronic kidney disease


ICD Codes:  N18.9 - Chronic kidney disease, unspecified; D63.1 - Anemia in 

chronic kidney disease


SNOMED:  555987868, 555667754


(7) CKD (chronic kidney disease) stage 5, GFR less than 15 ml/min


ICD Codes:  N18.5 - Chronic kidney disease, stage 5


SNOMED:  946695014


Status:  stable, progressing


Assessment/Plan


HD with UF per renal


AVF to be scheduled at a later date


epo/iron


BP rx


o2 as needed





Subjective


ROS Limited/Unobtainable:  No


Constitutional:  Reports: weakness


HEENT:  Reports: no symptoms


Cardiovascular:  Reports: edema


Respiratory:  Reports: cough, shortness of breath


Gastrointestinal/Abdominal:  Reports: no symptoms


Genitourinary:  Reports: no symptoms


Neurologic/Psychiatric:  Reports: no symptoms


Endocrine:  Reports: no symptoms


Hematologic/Lymphatic:  Reports: anemia


Allergies:  


Coded Allergies:  


     No Known Allergies (Unverified , 9/25/17)


All Systems:  reviewed and negative except above


Subjective


s/p perm cath placement. tolerated well. stable sob. anemia improving.





Objective





Last 24 Hour Vital Signs








  Date Time  Temp Pulse Resp B/P (MAP) Pulse Ox O2 Delivery O2 Flow Rate FiO2


 


9/6/18 14:39  75 21 157/91 (113) 94   


 


9/6/18 14:35  77 21 161/81 (107) 94   


 


9/6/18 14:30  75 21 165/81 (109) 94   


 


9/6/18 14:25  80 21 157/80 (105) 92   


 


9/6/18 14:20  80 21 141/85 (103) 93   


 


9/6/18 14:15  83 21 156/84 (108) 92   


 


9/6/18 14:10  82 21 155/84 (107) 91   


 


9/6/18 14:05  69 21 160/93 (115) 93   


 


9/6/18 14:00  69 21 170/90 (116) 94   


 


9/6/18 13:55  69 21 162/92 (115) 95   


 


9/6/18 13:50  69 21 163/91 (115) 94   


 


9/6/18 13:48 97.8 75 20     


 


9/6/18 13:45  63 21 169/87 (114) 93   


 


9/6/18 13:35  71 21 167/90 (115) 93   


 


9/6/18 13:30  60 13 159/89 (112) 95   


 


9/6/18 13:26  62 24 142/90 (107) 92   


 


9/6/18 12:00 98.0 67 20 146/81 (102) 96   





 98.0       


 


9/6/18 12:00  68      


 


9/6/18 10:29    161/80    


 


9/6/18 09:00      Room Air  


 


9/6/18 08:35  71  157/83    


 


9/6/18 08:34    157/83    


 


9/6/18 08:34  71  157/83    


 


9/6/18 08:00  63      


 


9/6/18 08:00 97.8 71 20 157/83 (107) 96   





 97.8       


 


9/6/18 04:00 98.2 63 22 151/84 (106) 95   





 98.2       


 


9/6/18 04:00  63      


 


9/6/18 02:13    149/75    


 


9/6/18 00:00 97.9 65 22 149/75 (99) 94   





 97.9       


 


9/6/18 00:00  66      


 


9/5/18 21:52  70  138/62    


 


9/5/18 21:00      Room Air  


 


9/5/18 20:00  60      


 


9/5/18 20:00 98.0 98 22 142/89 (106) 93   





 98.0       


 


9/5/18 17:24    149/73    


 


9/5/18 17:24  60  149/73    


 


9/5/18 17:04 97.6 60 20 149/73 (98) 95   





 97.6       

















Intake and Output  


 


 9/5/18 9/6/18





 19:00 07:00


 


Intake Total 360 ml 


 


Balance 360 ml 


 


  


 


Intake Oral 360 ml 


 


# Voids  2








Laboratory Tests


9/6/18 05:50: 


White Blood Count 8.2, Red Blood Count 3.15L, Hemoglobin 9.7L, Hematocrit 29.8L

, Mean Corpuscular Volume 95, Mean Corpuscular Hemoglobin 30.6, Mean 

Corpuscular Hemoglobin Concent 32.4, Red Cell Distribution Width 17.8H, 

Platelet Count 241, Mean Platelet Volume 6.5, Neutrophils (%) (Auto) 71.0, 

Lymphocytes (%) (Auto) 12.5L, Monocytes (%) (Auto) 11.7H, Eosinophils (%) (Auto

) 3.7H, Basophils (%) (Auto) 1.1, Prothrombin Time 11.7H, Prothromb Time 

International Ratio 1.1, Activated Partial Thromboplast Time 28, Sodium Level 

139, Potassium Level 4.4, Chloride Level 107, Carbon Dioxide Level 22, Anion 

Gap 10, Blood Urea Nitrogen 46H, Creatinine 6.7H, Estimat Glomerular Filtration 

Rate 9.9, Glucose Level 150H, Calcium Level 8.4L


9/6/18 16:00: 


White Blood Count 8.3, Red Blood Count 3.18L, Hemoglobin 9.9L, Hematocrit 29.8L

, Mean Corpuscular Volume 94, Mean Corpuscular Hemoglobin 31.1H, Mean 

Corpuscular Hemoglobin Concent 33.1, Red Cell Distribution Width 17.8H, 

Platelet Count 267, Mean Platelet Volume 6.6, Neutrophils (%) (Auto) 69.6, 

Lymphocytes (%) (Auto) 14.0L, Monocytes (%) (Auto) 11.2H, Eosinophils (%) (Auto

) 3.5H, Basophils (%) (Auto) 1.7, Sodium Level 141, Potassium Level 4.3, 

Chloride Level 108H, Carbon Dioxide Level 22, Anion Gap 11, Blood Urea Nitrogen 

48H, Creatinine 6.8H, Estimat Glomerular Filtration Rate 9.8, Glucose Level 149H

, Calcium Level 8.7


Height (Feet):  6


Height (Inches):  2.00


Weight (Pounds):  275


Objective


General Appearance:  WD/WN


Neck:  supple


Cardiovascular:  regular rhythm


Respiratory/Chest:  chest wall non-tender, lungs clear, normal breath sounds


Abdomen:  normal bowel sounds, non tender, soft, no organomegaly


Edema:  no edema noted Arm (L), no edema noted Arm (R), no edema noted Leg (L), 

no edema noted Leg (R), no edema noted Pedal (L), no edema noted Pedal (R), no 

edema noted Generalized


Edema:  trace edema


Neurologic:  CNs II-XII grossly normal, no motor/sensory deficits, alert, 

oriented x 3, responsive


Skin:  normal pigmentation


Lymphatic:  normal anterior cervical (L), normal anterior cervical (R), normal 

posterior cervical (L), normal posterior cervical (R), normal submandibular (L)

, normal submandibular (R), normal supraclavicular (L), normal supraclavicular (

R), normal axillary (L), normal axillary (R), normal inguinal (L), normal 

inguinal (R), normal other











Michael Nunez MD Sep 6, 2018 16:39

## 2018-09-07 VITALS — DIASTOLIC BLOOD PRESSURE: 65 MMHG | SYSTOLIC BLOOD PRESSURE: 141 MMHG

## 2018-09-07 VITALS — DIASTOLIC BLOOD PRESSURE: 76 MMHG | SYSTOLIC BLOOD PRESSURE: 139 MMHG

## 2018-09-07 VITALS — SYSTOLIC BLOOD PRESSURE: 162 MMHG | DIASTOLIC BLOOD PRESSURE: 88 MMHG

## 2018-09-07 VITALS — DIASTOLIC BLOOD PRESSURE: 61 MMHG | SYSTOLIC BLOOD PRESSURE: 128 MMHG

## 2018-09-07 VITALS — DIASTOLIC BLOOD PRESSURE: 90 MMHG | SYSTOLIC BLOOD PRESSURE: 159 MMHG

## 2018-09-07 VITALS — DIASTOLIC BLOOD PRESSURE: 74 MMHG | SYSTOLIC BLOOD PRESSURE: 154 MMHG

## 2018-09-07 RX ADMIN — HYDRALAZINE HYDROCHLORIDE SCH MG: 50 TABLET ORAL at 11:41

## 2018-09-07 RX ADMIN — METOPROLOL TARTRATE SCH MG: 50 TABLET, FILM COATED ORAL at 08:58

## 2018-09-07 RX ADMIN — INSULIN ASPART SCH UNITS: 100 INJECTION, SOLUTION INTRAVENOUS; SUBCUTANEOUS at 16:28

## 2018-09-07 RX ADMIN — HEPARIN SODIUM SCH UNITS: 5000 INJECTION INTRAVENOUS; SUBCUTANEOUS at 20:43

## 2018-09-07 RX ADMIN — ERYTHROPOIETIN SCH UNITS: 20000 INJECTION, SOLUTION INTRAVENOUS; SUBCUTANEOUS at 20:39

## 2018-09-07 RX ADMIN — DOXAZOSIN MESYLATE SCH MG: 4 TABLET ORAL at 08:58

## 2018-09-07 RX ADMIN — HEPARIN SODIUM SCH UNITS: 5000 INJECTION INTRAVENOUS; SUBCUTANEOUS at 00:21

## 2018-09-07 RX ADMIN — Medication SCH TAB: at 08:58

## 2018-09-07 RX ADMIN — INSULIN ASPART SCH UNITS: 100 INJECTION, SOLUTION INTRAVENOUS; SUBCUTANEOUS at 00:20

## 2018-09-07 RX ADMIN — ATORVASTATIN CALCIUM SCH MG: 20 TABLET, FILM COATED ORAL at 20:39

## 2018-09-07 RX ADMIN — ASPIRIN 81 MG SCH MG: 81 TABLET ORAL at 08:58

## 2018-09-07 RX ADMIN — ISOSORBIDE MONONITRATE SCH MG: 30 TABLET, EXTENDED RELEASE ORAL at 08:58

## 2018-09-07 RX ADMIN — INSULIN ASPART SCH UNITS: 100 INJECTION, SOLUTION INTRAVENOUS; SUBCUTANEOUS at 11:55

## 2018-09-07 RX ADMIN — HEPARIN SODIUM SCH UNITS: 5000 INJECTION INTRAVENOUS; SUBCUTANEOUS at 09:05

## 2018-09-07 RX ADMIN — BENZTROPINE MESYLATE SCH MG: 1 TABLET ORAL at 08:58

## 2018-09-07 RX ADMIN — INSULIN ASPART SCH UNITS: 100 INJECTION, SOLUTION INTRAVENOUS; SUBCUTANEOUS at 05:46

## 2018-09-07 RX ADMIN — DOXAZOSIN MESYLATE SCH MG: 4 TABLET ORAL at 20:38

## 2018-09-07 RX ADMIN — HYDRALAZINE HYDROCHLORIDE SCH MG: 50 TABLET ORAL at 17:24

## 2018-09-07 RX ADMIN — INSULIN ASPART SCH UNITS: 100 INJECTION, SOLUTION INTRAVENOUS; SUBCUTANEOUS at 20:42

## 2018-09-07 RX ADMIN — HYDRALAZINE HYDROCHLORIDE SCH MG: 50 TABLET ORAL at 02:25

## 2018-09-07 RX ADMIN — METOPROLOL TARTRATE SCH MG: 50 TABLET, FILM COATED ORAL at 20:39

## 2018-09-07 RX ADMIN — METOPROLOL TARTRATE SCH MG: 50 TABLET, FILM COATED ORAL at 00:22

## 2018-09-07 NOTE — NEPHROLOGY PROGRESS NOTE
Assessment/Plan


Problem List:  


(1) ESRD (end stage renal disease)


(2) Elevated troponin


(3) Nephrotic syndrome


(4) CHF (congestive heart failure)


Plan


HD tomorrow


Discussed with RN


cont Epogen


cont Nephrovite


check PTH as outpt





Subjective


Subjective


feels ok





Objective


Objective





Last 24 Hour Vital Signs








  Date Time  Temp Pulse Resp B/P (MAP) Pulse Ox O2 Delivery O2 Flow Rate FiO2


 


9/7/18 12:00 98.0 57 17 139/76 (97) 94   





 98.0       


 


9/7/18 11:41    154/84    


 


9/7/18 09:00      Room Air  


 


9/7/18 08:58    162/88    


 


9/7/18 08:58  89  162/88    


 


9/7/18 08:58  89  162/88    


 


9/7/18 08:00 98.4 89 19 162/88 (112) 96   





 98.4       


 


9/7/18 04:00 98.5 60 19 141/65 (90) 95   





 98.5       


 


9/7/18 04:00  54      


 


9/7/18 02:25    146/73    


 


9/7/18 01:06  68      


 


9/7/18 00:22  73  159/90    


 


9/7/18 00:00 97.8 73 18 159/90 (113) 99   





 97.8       


 


9/6/18 21:00      Room Air  


 


9/6/18 20:00 97.7 69 20 151/75 (100) 92   





 97.7       


 


9/6/18 20:00  71      


 


9/6/18 18:00    157/91    


 


9/6/18 18:00  75  157/91    


 


9/6/18 16:00  59      


 


9/6/18 16:00  63 20 145/89 (107) 94   


 


9/6/18 14:39  75 21 157/91 (113) 94   


 


9/6/18 14:35  77 21 161/81 (107) 94   


 


9/6/18 14:30  75 21 165/81 (109) 94   


 


9/6/18 14:25  80 21 157/80 (105) 92   


 


9/6/18 14:20  80 21 141/85 (103) 93   


 


9/6/18 14:15  83 21 156/84 (108) 92   


 


9/6/18 14:10  82 21 155/84 (107) 91   


 


9/6/18 14:05  69 21 160/93 (115) 93   

















Intake and Output  


 


 9/6/18 9/7/18





 19:00 07:00


 


Intake Total 360 ml 


 


Balance 360 ml 


 


  


 


Intake Oral 360 ml 


 


# Voids  5


 


# Bowel Movements  1








Laboratory Tests


9/6/18 16:00: 


White Blood Count 8.3, Red Blood Count 3.18L, Hemoglobin 9.9L, Hematocrit 29.8L

, Mean Corpuscular Volume 94, Mean Corpuscular Hemoglobin 31.1H, Mean 

Corpuscular Hemoglobin Concent 33.1, Red Cell Distribution Width 17.8H, 

Platelet Count 267, Mean Platelet Volume 6.6, Neutrophils (%) (Auto) 69.6, 

Lymphocytes (%) (Auto) 14.0L, Monocytes (%) (Auto) 11.2H, Eosinophils (%) (Auto

) 3.5H, Basophils (%) (Auto) 1.7, Sodium Level 141, Potassium Level 4.3, 

Chloride Level 108H, Carbon Dioxide Level 22, Anion Gap 11, Blood Urea Nitrogen 

48H, Creatinine 6.8H, Estimat Glomerular Filtration Rate 9.8, Glucose Level 149H

, Calcium Level 8.7


Height (Feet):  6


Height (Inches):  2.00


Weight (Pounds):  275


Cardiovascular:  normal rate


Respiratory/Chest:  lungs clear


Extremities:  moderate edema











Jude Haley MD Sep 7, 2018 14:02

## 2018-09-07 NOTE — GENERAL PROGRESS NOTE
Assessment/Plan


Problem List:  


(1) UTI (urinary tract infection)


ICD Codes:  N39.0 - Urinary tract infection, site not specified


SNOMED:  83309741


(2) Malignant hypertension (arteriolar nephrosclerosis)


ICD Codes:  I12.9 - Hypertensive chronic kidney disease with stage 1 through 

stage 4 chronic kidney disease, or unspecified chronic kidney disease


SNOMED:  09143596


(3) Diabetes


ICD Codes:  E11.9 - Type 2 diabetes mellitus without complications


SNOMED:  53981298


(4) ESRD (end stage renal disease)


ICD Codes:  N18.6 - End stage renal disease


SNOMED:  38480544, 523760854, 566250137


(5) Elevated troponin


ICD Codes:  R74.8 - Abnormal levels of other serum enzymes


SNOMED:  373886965, 669733250, 821188414


(6) Anemia in chronic kidney disease


ICD Codes:  N18.9 - Chronic kidney disease, unspecified; D63.1 - Anemia in 

chronic kidney disease


SNOMED:  911238181, 538146532


(7) CKD (chronic kidney disease) stage 5, GFR less than 15 ml/min


ICD Codes:  N18.5 - Chronic kidney disease, stage 5


SNOMED:  653208080


Status:  stable, progressing


Assessment/Plan


HD with UF per renal


AVF to be scheduled at a later date


epo/iron


BP rx


o2 as needed





Subjective


ROS Limited/Unobtainable:  No


Constitutional:  Reports: malaise, weakness


HEENT:  Reports: no symptoms


Cardiovascular:  Reports: edema


Respiratory:  Reports: no symptoms


Gastrointestinal/Abdominal:  Reports: no symptoms


Genitourinary:  Reports: no symptoms


Neurologic/Psychiatric:  Reports: no symptoms


Endocrine:  Reports: no symptoms


Hematologic/Lymphatic:  Reports: anemia


Allergies:  


Coded Allergies:  


     No Known Allergies (Unverified , 9/25/17)


All Systems:  reviewed and negative except above


Subjective


no complaints. tolerated HD. no new complaints. less sob. denies cp





Objective





Last 24 Hour Vital Signs








  Date Time  Temp Pulse Resp B/P (MAP) Pulse Ox O2 Delivery O2 Flow Rate FiO2


 


9/7/18 12:00 98.0 57 17 139/76 (97) 94   





 98.0       


 


9/7/18 11:41    154/84    


 


9/7/18 09:00      Room Air  


 


9/7/18 08:58    162/88    


 


9/7/18 08:58  89  162/88    


 


9/7/18 08:58  89  162/88    


 


9/7/18 08:00 98.4 89 19 162/88 (112) 96   





 98.4       


 


9/7/18 04:00 98.5 60 19 141/65 (90) 95   





 98.5       


 


9/7/18 04:00  54      


 


9/7/18 02:25    146/73    


 


9/7/18 01:06  68      


 


9/7/18 00:22  73  159/90    


 


9/7/18 00:00 97.8 73 18 159/90 (113) 99   





 97.8       


 


9/6/18 21:00      Room Air  


 


9/6/18 20:00 97.7 69 20 151/75 (100) 92   





 97.7       


 


9/6/18 20:00  71      


 


9/6/18 18:00    157/91    


 


9/6/18 18:00  75  157/91    


 


9/6/18 16:00  59      


 


9/6/18 16:00  63 20 145/89 (107) 94   


 


9/6/18 14:39  75 21 157/91 (113) 94   


 


9/6/18 14:35  77 21 161/81 (107) 94   


 


9/6/18 14:30  75 21 165/81 (109) 94   


 


9/6/18 14:25  80 21 157/80 (105) 92   


 


9/6/18 14:20  80 21 141/85 (103) 93   


 


9/6/18 14:15  83 21 156/84 (108) 92   


 


9/6/18 14:10  82 21 155/84 (107) 91   


 


9/6/18 14:05  69 21 160/93 (115) 93   


 


9/6/18 14:00  69 21 170/90 (116) 94   


 


9/6/18 13:55  69 21 162/92 (115) 95   


 


9/6/18 13:50  69 21 163/91 (115) 94   


 


9/6/18 13:48 97.8 75 20     


 


9/6/18 13:45  63 21 169/87 (114) 93   


 


9/6/18 13:35  71 21 167/90 (115) 93   


 


9/6/18 13:30  60 13 159/89 (112) 95   


 


9/6/18 13:26  62 24 142/90 (107) 92   

















Intake and Output  


 


 9/6/18 9/7/18





 19:00 07:00


 


Intake Total 360 ml 


 


Balance 360 ml 


 


  


 


Intake Oral 360 ml 


 


# Voids  5


 


# Bowel Movements  1








Laboratory Tests


9/6/18 16:00: 


White Blood Count 8.3, Red Blood Count 3.18L, Hemoglobin 9.9L, Hematocrit 29.8L

, Mean Corpuscular Volume 94, Mean Corpuscular Hemoglobin 31.1H, Mean 

Corpuscular Hemoglobin Concent 33.1, Red Cell Distribution Width 17.8H, 

Platelet Count 267, Mean Platelet Volume 6.6, Neutrophils (%) (Auto) 69.6, 

Lymphocytes (%) (Auto) 14.0L, Monocytes (%) (Auto) 11.2H, Eosinophils (%) (Auto

) 3.5H, Basophils (%) (Auto) 1.7, Sodium Level 141, Potassium Level 4.3, 

Chloride Level 108H, Carbon Dioxide Level 22, Anion Gap 11, Blood Urea Nitrogen 

48H, Creatinine 6.8H, Estimat Glomerular Filtration Rate 9.8, Glucose Level 149H

, Calcium Level 8.7


Height (Feet):  6


Height (Inches):  2.00


Weight (Pounds):  275


Objective


General Appearance:  WD/WN


Neck:  supple


Cardiovascular:  regular rhythm


Respiratory/Chest:  chest wall non-tender, lungs clear, normal breath sounds


Abdomen:  normal bowel sounds, non tender, soft, no organomegaly


Edema:  no edema noted Arm (L), no edema noted Arm (R), no edema noted Leg (L), 

no edema noted Leg (R), no edema noted Pedal (L), no edema noted Pedal (R), no 

edema noted Generalized


Edema:  trace edema


Neurologic:  CNs II-XII grossly normal, no motor/sensory deficits, alert, 

oriented x 3, responsive


Skin:  normal pigmentation


Lymphatic:  normal anterior cervical (L), normal anterior cervical (R), normal 

posterior cervical (L), normal posterior cervical (R), normal submandibular (L)

, normal submandibular (R), normal supraclavicular (L), normal supraclavicular (

R), normal axillary (L), normal axillary (R), normal inguinal (L), normal 

inguinal (R), normal other











Michael Nunez MD Sep 7, 2018 12:42

## 2018-09-08 VITALS — SYSTOLIC BLOOD PRESSURE: 113 MMHG | DIASTOLIC BLOOD PRESSURE: 71 MMHG

## 2018-09-08 VITALS — SYSTOLIC BLOOD PRESSURE: 163 MMHG | DIASTOLIC BLOOD PRESSURE: 86 MMHG

## 2018-09-08 VITALS — DIASTOLIC BLOOD PRESSURE: 93 MMHG | SYSTOLIC BLOOD PRESSURE: 153 MMHG

## 2018-09-08 VITALS — DIASTOLIC BLOOD PRESSURE: 78 MMHG | SYSTOLIC BLOOD PRESSURE: 156 MMHG

## 2018-09-08 VITALS — SYSTOLIC BLOOD PRESSURE: 112 MMHG | DIASTOLIC BLOOD PRESSURE: 72 MMHG

## 2018-09-08 VITALS — SYSTOLIC BLOOD PRESSURE: 149 MMHG | DIASTOLIC BLOOD PRESSURE: 84 MMHG

## 2018-09-08 RX ADMIN — METOPROLOL TARTRATE SCH MG: 50 TABLET, FILM COATED ORAL at 08:38

## 2018-09-08 RX ADMIN — Medication SCH TAB: at 08:38

## 2018-09-08 RX ADMIN — INSULIN ASPART SCH UNITS: 100 INJECTION, SOLUTION INTRAVENOUS; SUBCUTANEOUS at 16:13

## 2018-09-08 RX ADMIN — HYDRALAZINE HYDROCHLORIDE SCH MG: 50 TABLET ORAL at 18:00

## 2018-09-08 RX ADMIN — DOXAZOSIN MESYLATE SCH MG: 4 TABLET ORAL at 20:41

## 2018-09-08 RX ADMIN — ASPIRIN 81 MG SCH MG: 81 TABLET ORAL at 08:38

## 2018-09-08 RX ADMIN — INSULIN ASPART SCH UNITS: 100 INJECTION, SOLUTION INTRAVENOUS; SUBCUTANEOUS at 20:45

## 2018-09-08 RX ADMIN — INSULIN ASPART SCH UNITS: 100 INJECTION, SOLUTION INTRAVENOUS; SUBCUTANEOUS at 06:36

## 2018-09-08 RX ADMIN — METOPROLOL TARTRATE SCH MG: 50 TABLET, FILM COATED ORAL at 20:41

## 2018-09-08 RX ADMIN — HYDRALAZINE HYDROCHLORIDE SCH MG: 50 TABLET ORAL at 01:25

## 2018-09-08 RX ADMIN — BENZTROPINE MESYLATE SCH MG: 1 TABLET ORAL at 08:37

## 2018-09-08 RX ADMIN — OXYMETAZOLINE HYDROCHLORIDE PRN SPRAY: 0.05 SPRAY NASAL at 14:42

## 2018-09-08 RX ADMIN — OXYMETAZOLINE HYDROCHLORIDE PRN SPRAY: 0.05 SPRAY NASAL at 06:31

## 2018-09-08 RX ADMIN — ATORVASTATIN CALCIUM SCH MG: 20 TABLET, FILM COATED ORAL at 20:41

## 2018-09-08 RX ADMIN — ISOSORBIDE MONONITRATE SCH MG: 30 TABLET, EXTENDED RELEASE ORAL at 08:38

## 2018-09-08 RX ADMIN — HEPARIN SODIUM SCH UNITS: 5000 INJECTION INTRAVENOUS; SUBCUTANEOUS at 08:39

## 2018-09-08 RX ADMIN — HYDRALAZINE HYDROCHLORIDE SCH MG: 50 TABLET ORAL at 09:56

## 2018-09-08 RX ADMIN — DOXAZOSIN MESYLATE SCH MG: 4 TABLET ORAL at 08:38

## 2018-09-08 RX ADMIN — HEPARIN SODIUM SCH UNITS: 5000 INJECTION INTRAVENOUS; SUBCUTANEOUS at 20:42

## 2018-09-08 RX ADMIN — INSULIN ASPART SCH UNITS: 100 INJECTION, SOLUTION INTRAVENOUS; SUBCUTANEOUS at 11:21

## 2018-09-08 NOTE — GENERAL PROGRESS NOTE
Assessment/Plan


Problem List:  


(1) UTI (urinary tract infection)


ICD Codes:  N39.0 - Urinary tract infection, site not specified


SNOMED:  35523081


(2) Malignant hypertension (arteriolar nephrosclerosis)


ICD Codes:  I12.9 - Hypertensive chronic kidney disease with stage 1 through 

stage 4 chronic kidney disease, or unspecified chronic kidney disease


SNOMED:  12219271


(3) Diabetes


ICD Codes:  E11.9 - Type 2 diabetes mellitus without complications


SNOMED:  12526812


(4) ESRD (end stage renal disease)


ICD Codes:  N18.6 - End stage renal disease


SNOMED:  63455962, 223841878, 358337841


(5) Elevated troponin


ICD Codes:  R74.8 - Abnormal levels of other serum enzymes


SNOMED:  527545791, 651056054, 307853061


(6) Anemia in chronic kidney disease


ICD Codes:  N18.9 - Chronic kidney disease, unspecified; D63.1 - Anemia in 

chronic kidney disease


SNOMED:  240293777, 040941636


(7) CKD (chronic kidney disease) stage 5, GFR less than 15 ml/min


ICD Codes:  N18.5 - Chronic kidney disease, stage 5


SNOMED:  683761187


Status:  stable, progressing


Assessment/Plan


HD with UF per renal. still has edema and sob


AVF to be scheduled at a later date


epo/iron


BP rx


o2 as needed





Subjective


ROS Limited/Unobtainable:  No


Constitutional:  Reports: malaise, weakness


HEENT:  Reports: no symptoms


Cardiovascular:  Reports: edema


Respiratory:  Reports: cough, shortness of breath


Gastrointestinal/Abdominal:  Reports: no symptoms


Genitourinary:  Reports: no symptoms


Neurologic/Psychiatric:  Reports: no symptoms


Endocrine:  Reports: no symptoms


Hematologic/Lymphatic:  Reports: anemia


Allergies:  


Coded Allergies:  


     No Known Allergies (Unverified , 9/25/17)


All Systems:  reviewed and negative except above


Subjective


no events. still with edema and sob. very "tired." Due for HD today again.





Objective





Last 24 Hour Vital Signs








  Date Time  Temp Pulse Resp B/P (MAP) Pulse Ox O2 Delivery O2 Flow Rate FiO2


 


9/8/18 04:00  54      


 


9/8/18 04:00 97.4 86 18 112/72 (85) 98   





 97.4       


 


9/8/18 01:25    113/71    


 


9/8/18 00:00  70      


 


9/8/18 00:00 97.2 55 18 113/71 (85) 98   





 97.2       


 


9/7/18 21:00      Room Air  


 


9/7/18 20:39  62  128/61    


 


9/7/18 20:00 97.9 62 18 128/61 (83) 95   





 97.9       


 


9/7/18 20:00  75      


 


9/7/18 17:25  60  154/74    


 


9/7/18 17:24    154/74    


 


9/7/18 16:00  60      


 


9/7/18 16:00 98.8 50 18 154/74 (100) 98   





 98.8       


 


9/7/18 12:00 98.0 57 17 139/76 (97) 94   





 98.0       


 


9/7/18 12:00  55      


 


9/7/18 11:41    154/84    


 


9/7/18 09:00      Room Air  


 


9/7/18 08:58    162/88    


 


9/7/18 08:58  89  162/88    


 


9/7/18 08:58  89  162/88    


 


9/7/18 08:00  63      


 


9/7/18 08:00 98.4 89 19 162/88 (112) 96   





 98.4       

















Intake and Output  


 


 9/7/18 9/8/18





 19:00 07:00


 


Intake Total  800 ml


 


Balance  800 ml


 


  


 


Other  800 ml


 


# Voids  6


 


# Bowel Movements  1








Height (Feet):  6


Height (Inches):  2.00


Weight (Pounds):  275


Objective


General Appearance:  WD/WN


Neck:  supple


Cardiovascular:  regular rhythm


Respiratory/Chest:  chest wall non-tender, lungs clear, normal breath sounds


Abdomen:  normal bowel sounds, non tender, soft, no organomegaly


Edema:  no edema noted Arm (L), no edema noted Arm (R), no edema noted Leg (L), 

no edema noted Leg (R), no edema noted Pedal (L), no edema noted Pedal (R), no 

edema noted Generalized


Edema:  trace edema


Neurologic:  CNs II-XII grossly normal, no motor/sensory deficits, alert, 

oriented x 3, responsive


Skin:  normal pigmentation


Lymphatic:  normal anterior cervical (L), normal anterior cervical (R), normal 

posterior cervical (L), normal posterior cervical (R), normal submandibular (L)

, normal submandibular (R), normal supraclavicular (L), normal supraclavicular (

R), normal axillary (L), normal axillary (R), normal inguinal (L), normal 

inguinal (R), normal other











Michael Nunez MD Sep 8, 2018 07:18

## 2018-09-08 NOTE — NEPHROLOGY PROGRESS NOTE
Assessment/Plan


Assessment/Plan


A/P





1) ESRD- HD planned for today


    - TTS


2) Anemia of CKD- on EPO


     - hold EPO once Hgb >11


3) CHF- compensated. UF with HD


4) HTN stable. On Lopressor


5) SHPT- PTH and phos as outpt





Subjective


Date patient seen:  Sep 8, 2018


Time patient seen:  11:35


ROS Limited/Unobtainable:  No


Allergies:  


Coded Allergies:  


     No Known Allergies (Unverified , 9/25/17)


All Systems:  reviewed and negative except above


Subjective


Patient resting and in no acute distress. Flat affect





Objective





Last 24 Hour Vital Signs








  Date Time  Temp Pulse Resp B/P (MAP) Pulse Ox O2 Delivery O2 Flow Rate FiO2


 


9/8/18 09:56    145/83    


 


9/8/18 09:00      Room Air  


 


9/8/18 08:38    156/78    


 


9/8/18 08:38  60  156/78    


 


9/8/18 08:38  60  156/78    


 


9/8/18 08:00 98.0 60 20 156/78 (104) 94   





 98.0       


 


9/8/18 08:00  70      


 


9/8/18 04:00  54      


 


9/8/18 04:00 97.4 86 18 112/72 (85) 98   





 97.4       


 


9/8/18 01:25    113/71    


 


9/8/18 00:00  70      


 


9/8/18 00:00 97.2 55 18 113/71 (85) 98   





 97.2       


 


9/7/18 21:00      Room Air  


 


9/7/18 20:39  62  128/61    


 


9/7/18 20:00 97.9 62 18 128/61 (83) 95   





 97.9       


 


9/7/18 20:00  75      


 


9/7/18 17:25  60  154/74    


 


9/7/18 17:24    154/74    


 


9/7/18 16:00  60      


 


9/7/18 16:00 98.8 50 18 154/74 (100) 98   





 98.8       


 


9/7/18 12:00 98.0 57 17 139/76 (97) 94   





 98.0       


 


9/7/18 12:00  55      


 


9/7/18 11:41    154/84    

















Intake and Output  


 


 9/7/18 9/8/18





 19:00 07:00


 


Intake Total  800 ml


 


Balance  800 ml


 


  


 


Other  800 ml


 


# Voids  6


 


# Bowel Movements  1








Height (Feet):  6


Height (Inches):  2.00


Weight (Pounds):  275


General Appearance:  WD/WN, no apparent distress


EENT:  normal ENT inspection


Neck:  normal alignment, supple


Cardiovascular:  normal rate, regular rhythm


Respiratory/Chest:  lungs clear, normal breath sounds


Abdomen:  non tender, soft


Edema:  no edema noted Arm (L), no edema noted Arm (R), no edema noted Leg (L), 

no edema noted Leg (R), no edema noted Pedal (L), no edema noted Pedal (R), no 

edema noted Generalized











Aroldo Orona MD Sep 8, 2018 11:38

## 2018-09-09 VITALS — DIASTOLIC BLOOD PRESSURE: 85 MMHG | SYSTOLIC BLOOD PRESSURE: 159 MMHG

## 2018-09-09 VITALS — DIASTOLIC BLOOD PRESSURE: 84 MMHG | SYSTOLIC BLOOD PRESSURE: 162 MMHG

## 2018-09-09 VITALS — DIASTOLIC BLOOD PRESSURE: 85 MMHG | SYSTOLIC BLOOD PRESSURE: 171 MMHG

## 2018-09-09 VITALS — SYSTOLIC BLOOD PRESSURE: 163 MMHG | DIASTOLIC BLOOD PRESSURE: 86 MMHG

## 2018-09-09 VITALS — DIASTOLIC BLOOD PRESSURE: 71 MMHG | SYSTOLIC BLOOD PRESSURE: 155 MMHG

## 2018-09-09 VITALS — SYSTOLIC BLOOD PRESSURE: 154 MMHG | DIASTOLIC BLOOD PRESSURE: 79 MMHG

## 2018-09-09 LAB
ANION GAP SERPL CALC-SCNC: 8 MMOL/L (ref 5–15)
BUN SERPL-MCNC: 22 MG/DL (ref 7–18)
CALCIUM SERPL-MCNC: 8.7 MG/DL (ref 8.5–10.1)
CHLORIDE SERPL-SCNC: 106 MMOL/L (ref 98–107)
CO2 SERPL-SCNC: 24 MMOL/L (ref 21–32)
CREAT SERPL-MCNC: 4.5 MG/DL (ref 0.55–1.3)
POTASSIUM SERPL-SCNC: 3.7 MMOL/L (ref 3.5–5.1)
SODIUM SERPL-SCNC: 138 MMOL/L (ref 136–145)

## 2018-09-09 RX ADMIN — ATORVASTATIN CALCIUM SCH MG: 20 TABLET, FILM COATED ORAL at 21:30

## 2018-09-09 RX ADMIN — HEPARIN SODIUM SCH UNITS: 5000 INJECTION INTRAVENOUS; SUBCUTANEOUS at 21:35

## 2018-09-09 RX ADMIN — INSULIN ASPART SCH UNITS: 100 INJECTION, SOLUTION INTRAVENOUS; SUBCUTANEOUS at 16:39

## 2018-09-09 RX ADMIN — DOXAZOSIN MESYLATE SCH MG: 4 TABLET ORAL at 08:55

## 2018-09-09 RX ADMIN — Medication SCH TAB: at 08:55

## 2018-09-09 RX ADMIN — OXYMETAZOLINE HYDROCHLORIDE PRN SPRAY: 0.05 SPRAY NASAL at 21:33

## 2018-09-09 RX ADMIN — INSULIN ASPART SCH UNITS: 100 INJECTION, SOLUTION INTRAVENOUS; SUBCUTANEOUS at 21:34

## 2018-09-09 RX ADMIN — ASPIRIN 81 MG SCH MG: 81 TABLET ORAL at 08:56

## 2018-09-09 RX ADMIN — OXYMETAZOLINE HYDROCHLORIDE PRN SPRAY: 0.05 SPRAY NASAL at 01:29

## 2018-09-09 RX ADMIN — BENZTROPINE MESYLATE SCH MG: 1 TABLET ORAL at 08:54

## 2018-09-09 RX ADMIN — METOPROLOL TARTRATE SCH MG: 50 TABLET, FILM COATED ORAL at 21:29

## 2018-09-09 RX ADMIN — METOPROLOL TARTRATE SCH MG: 50 TABLET, FILM COATED ORAL at 08:56

## 2018-09-09 RX ADMIN — HEPARIN SODIUM SCH UNITS: 5000 INJECTION INTRAVENOUS; SUBCUTANEOUS at 08:59

## 2018-09-09 RX ADMIN — INSULIN ASPART SCH UNITS: 100 INJECTION, SOLUTION INTRAVENOUS; SUBCUTANEOUS at 11:32

## 2018-09-09 RX ADMIN — INSULIN ASPART SCH UNITS: 100 INJECTION, SOLUTION INTRAVENOUS; SUBCUTANEOUS at 06:39

## 2018-09-09 RX ADMIN — HYDRALAZINE HYDROCHLORIDE SCH MG: 50 TABLET ORAL at 17:28

## 2018-09-09 RX ADMIN — HYDRALAZINE HYDROCHLORIDE SCH MG: 50 TABLET ORAL at 09:58

## 2018-09-09 RX ADMIN — HYDRALAZINE HYDROCHLORIDE SCH MG: 50 TABLET ORAL at 01:29

## 2018-09-09 RX ADMIN — DOXAZOSIN MESYLATE SCH MG: 4 TABLET ORAL at 21:30

## 2018-09-09 RX ADMIN — ISOSORBIDE MONONITRATE SCH MG: 30 TABLET, EXTENDED RELEASE ORAL at 08:55

## 2018-09-09 NOTE — PROGRESS NOTE
DATE:  09/06/2018



Cardiology Progress Note



Late entry for 09/06/2018.



SUBJECTIVE:  The patient was seen and evaluated.  The case was discussed

with his sister who is his durable power-of- for healthcare.  The

patient has no history chest pain.  Minimal shortness of breath.  He is

status post PermCath placement.



OBJECTIVE:

VITAL SIGNS:  Afebrile, blood pressure 157/91, pulse 75, and respiratory

rate 21.

GENERAL:  PermCath site clean and dry with no drainage, erythema, or

warmth.

LUNGS:  Clear.

CARDIAC:  Regular rhythm and rate.  Normal S1 and S2 with a fourth heart

sound.

ABDOMEN:  Soft.

EXTREMITIES:  With 1+ dependent edema.



LABORATORY DATA:  White count 8.2 and hemoglobin 9.7.  Sodium 139,

potassium 4.4, bicarbonate 22, BUN 46, and creatinine 6.7.



IMPRESSION:

1. End-stage renal disease.

2. Hypertensive heart disease.

3. Acute myocardial infarction.

4. Acute on chronic diastolic congestive heart failure.

5. Moderate protein-calorie malnutrition.

6. Type 2 diabetes mellitus with complications.



PLAN:

1. Hemodialysis with ultrafiltration.

2. Discontinue diuretics once ultra filtrated to euvolemic state.

3. Titrate antihypertensive therapy as volume status normalizes.

4. Insulin titration as well with expected changes ____ now that he is

on hemodialysis.









  ______________________________________________

  Edmar Munguia M.D.





DR:  [email or web address]

D:  09/09/2018 06:15

T:  09/09/2018 06:24

JOB#:  [id number]

CC:

## 2018-09-09 NOTE — NEPHROLOGY PROGRESS NOTE
Assessment/Plan


Assessment/Plan


A/P





1) ESRD  - TTS. Had HD yesterday


2) Anemia of CKD- on EPO


     - hold EPO once Hgb >11


3) CHF- compensated. UF with HD on TTS


4) HTN stable. On Lopressor


5) SHPT- PTH and phos as outpt





Subjective


Date patient seen:  Sep 9, 2018


Time patient seen:  07:46


ROS Limited/Unobtainable:  No


Allergies:  


Coded Allergies:  


     No Known Allergies (Unverified , 9/25/17)


All Systems:  reviewed and negative except above


Subjective


Patient resting. No complaints and feeling better this am





Objective





Last 24 Hour Vital Signs








  Date Time  Temp Pulse Resp B/P (MAP) Pulse Ox O2 Delivery O2 Flow Rate FiO2


 


9/9/18 04:00 98.0 72 23 159/85 (109) 96   





 98.0       


 


9/9/18 04:00  70      


 


9/9/18 02:35 98.6       


 


9/9/18 01:35 98.6       


 


9/9/18 01:29    162/84    


 


9/9/18 00:00 98.6 60 18 162/84 (110) 98   





 98.6       


 


9/9/18 00:00  60      


 


9/8/18 21:00      Room Air  


 


9/8/18 20:41  68  149/84    


 


9/8/18 20:00 98.1 66 22 163/86 (111) 93   





 98.1       


 


9/8/18 20:00  74      


 


9/8/18 18:00    149/84    


 


9/8/18 18:00  68  149/84    


 


9/8/18 16:00 97.7 63 24 149/84 (105) 94   





 97.7       


 


9/8/18 16:00  68      


 


9/8/18 12:00  54      


 


9/8/18 12:00 97.4 52 20 153/93 (113) 94   





 97.4       


 


9/8/18 09:56    145/83    


 


9/8/18 09:00      Room Air  


 


9/8/18 08:38    156/78    


 


9/8/18 08:38  60  156/78    


 


9/8/18 08:38  60  156/78    


 


9/8/18 08:00 98.0 60 20 156/78 (104) 94   





 98.0       


 


9/8/18 08:00  70      

















Intake and Output  


 


 9/8/18 9/9/18





 19:00 07:00


 


Intake Total  375 ml


 


Balance  375 ml


 


  


 


Intake Oral  375 ml


 


# Voids  1


 


# Bowel Movements  2








Height (Feet):  6


Height (Inches):  2.00


Weight (Pounds):  275


General Appearance:  WD/WN, no apparent distress


EENT:  normal ENT inspection


Neck:  normal alignment, supple


Cardiovascular:  normal rate, regular rhythm


Respiratory/Chest:  lungs clear, normal breath sounds


Abdomen:  non tender, soft


Edema:  no edema noted Arm (L), no edema noted Arm (R), no edema noted Leg (L), 

no edema noted Leg (R), no edema noted Pedal (L), no edema noted Pedal (R), no 

edema noted Generalized











Aroldo Orona MD Sep 9, 2018 07:53

## 2018-09-09 NOTE — GENERAL PROGRESS NOTE
Assessment/Plan


Problem List:  


(1) UTI (urinary tract infection)


ICD Codes:  N39.0 - Urinary tract infection, site not specified


SNOMED:  71958271


(2) Malignant hypertension (arteriolar nephrosclerosis)


ICD Codes:  I12.9 - Hypertensive chronic kidney disease with stage 1 through 

stage 4 chronic kidney disease, or unspecified chronic kidney disease


SNOMED:  55814418


(3) Diabetes


ICD Codes:  E11.9 - Type 2 diabetes mellitus without complications


SNOMED:  08125128


(4) ESRD (end stage renal disease)


ICD Codes:  N18.6 - End stage renal disease


SNOMED:  23405910, 565205805, 663373566


(5) Elevated troponin


ICD Codes:  R74.8 - Abnormal levels of other serum enzymes


SNOMED:  705869393, 055959647, 376580186


(6) Anemia in chronic kidney disease


ICD Codes:  N18.9 - Chronic kidney disease, unspecified; D63.1 - Anemia in 

chronic kidney disease


SNOMED:  042676849, 494274097


(7) CKD (chronic kidney disease) stage 5, GFR less than 15 ml/min


ICD Codes:  N18.5 - Chronic kidney disease, stage 5


SNOMED:  529558561


Status:  stable, progressing


Assessment/Plan


HD with UF per renal.


AVF to be scheduled at a later date


epo/iron


BP still high. difficult to control. on multiple meds


BP rx per cards


o2 as needed





Subjective


ROS Limited/Unobtainable:  No


Constitutional:  Reports: no symptoms


HEENT:  Reports: no symptoms


Cardiovascular:  Reports: edema


Respiratory:  Reports: no symptoms


Gastrointestinal/Abdominal:  Reports: no symptoms


Genitourinary:  Reports: no symptoms


Neurologic/Psychiatric:  Reports: no symptoms


Endocrine:  Reports: no symptoms


Hematologic/Lymphatic:  Reports: anemia


Allergies:  


Coded Allergies:  


     No Known Allergies (Unverified , 9/25/17)


All Systems:  reviewed and negative except above


Subjective


sad. had a dream about his mom. overall feel better. sob less. edema improving. 

no chest pain


tolerating HD.





Objective





Last 24 Hour Vital Signs








  Date Time  Temp Pulse Resp B/P (MAP) Pulse Ox O2 Delivery O2 Flow Rate FiO2


 


9/9/18 09:58    174/86    


 


9/9/18 09:00      Room Air  


 


9/9/18 08:56  71  171/85    


 


9/9/18 08:55    171/85    


 


9/9/18 08:55  71  171/85    


 


9/9/18 08:00 98.1 71 21 171/85 (113) 94   





 98.1       


 


9/9/18 08:00  80      


 


9/9/18 04:00 98.0 72 23 159/85 (109) 96   





 98.0       


 


9/9/18 04:00  70      


 


9/9/18 02:35 98.6       


 


9/9/18 01:35 98.6       


 


9/9/18 01:29    162/84    


 


9/9/18 00:00 98.6 60 18 162/84 (110) 98   





 98.6       


 


9/9/18 00:00  60      


 


9/8/18 21:00      Room Air  


 


9/8/18 20:41  68  149/84    


 


9/8/18 20:00 98.1 66 22 163/86 (111) 93   





 98.1       


 


9/8/18 20:00  74      


 


9/8/18 18:00    149/84    


 


9/8/18 18:00  68  149/84    


 


9/8/18 16:00 97.7 63 24 149/84 (105) 94   





 97.7       


 


9/8/18 16:00  68      


 


9/8/18 12:00  54      


 


9/8/18 12:00 97.4 52 20 153/93 (113) 94   





 97.4       

















Intake and Output  


 


 9/8/18 9/9/18





 19:00 07:00


 


Intake Total  375 ml


 


Balance  375 ml


 


  


 


Intake Oral  375 ml


 


# Voids  1


 


# Bowel Movements  2








Laboratory Tests


9/9/18 07:40: 


Sodium Level 138, Potassium Level 3.7, Chloride Level 106, Carbon Dioxide Level 

24, Anion Gap 8, Blood Urea Nitrogen 22H, Creatinine 4.5H, Estimat Glomerular 

Filtration Rate 15.8, Glucose Level 135H, Calcium Level 8.7


Height (Feet):  6


Height (Inches):  2.00


Weight (Pounds):  275


Objective


General Appearance:  WD/WN


Neck:  supple


Cardiovascular:  regular rhythm


Respiratory/Chest:  chest wall non-tender, lungs clear, normal breath sounds


Abdomen:  normal bowel sounds, non tender, soft, no organomegaly


Edema:  no edema noted Arm (L), no edema noted Arm (R), no edema noted Leg (L), 

no edema noted Leg (R), no edema noted Pedal (L), no edema noted Pedal (R), no 

edema noted Generalized


Edema:  trace edema


Neurologic:  CNs II-XII grossly normal, no motor/sensory deficits, alert, 

oriented x 3, responsive


Skin:  normal pigmentation


Lymphatic:  normal anterior cervical (L), normal anterior cervical (R), normal 

posterior cervical (L), normal posterior cervical (R), normal submandibular (L)

, normal submandibular (R), normal supraclavicular (L), normal supraclavicular (

R), normal axillary (L), normal axillary (R), normal inguinal (L), normal 

inguinal (R), normal other











Michael Nunez MD Sep 9, 2018 11:03

## 2018-09-09 NOTE — NEPHROLOGY PROGRESS NOTE
Assessment/Plan


Problem List:  


(1) ESRD (end stage renal disease)


(2) Elevated troponin


(3) Nephrotic syndrome


(4) CHF (congestive heart failure)


Plan


HD on Tuesday


cont Epogen


cont Nephrovite


check PTH as outpt





Subjective


Subjective


feels ok





Objective


Objective





Last 24 Hour Vital Signs








  Date Time  Temp Pulse Resp B/P (MAP) Pulse Ox O2 Delivery O2 Flow Rate FiO2


 


9/9/18 09:58    174/86    


 


9/9/18 09:00      Room Air  


 


9/9/18 08:56  71  171/85    


 


9/9/18 08:55    171/85    


 


9/9/18 08:55  71  171/85    


 


9/9/18 08:00 98.1 71 21 171/85 (113) 94   





 98.1       


 


9/9/18 08:00  80      


 


9/9/18 04:00 98.0 72 23 159/85 (109) 96   





 98.0       


 


9/9/18 04:00  70      


 


9/9/18 02:35 98.6       


 


9/9/18 01:35 98.6       


 


9/9/18 01:29    162/84    


 


9/9/18 00:00 98.6 60 18 162/84 (110) 98   





 98.6       


 


9/9/18 00:00  60      


 


9/8/18 21:00      Room Air  


 


9/8/18 20:41  68  149/84    


 


9/8/18 20:00 98.1 66 22 163/86 (111) 93   





 98.1       


 


9/8/18 20:00  74      


 


9/8/18 18:00    149/84    


 


9/8/18 18:00  68  149/84    


 


9/8/18 16:00 97.7 63 24 149/84 (105) 94   





 97.7       


 


9/8/18 16:00  68      


 


9/8/18 12:00  54      


 


9/8/18 12:00 97.4 52 20 153/93 (113) 94   





 97.4       

















Intake and Output  


 


 9/8/18 9/9/18





 19:00 07:00


 


Intake Total  375 ml


 


Balance  375 ml


 


  


 


Intake Oral  375 ml


 


# Voids  1


 


# Bowel Movements  2








Laboratory Tests


9/9/18 07:40: 


Sodium Level 138, Potassium Level 3.7, Chloride Level 106, Carbon Dioxide Level 

24, Anion Gap 8, Blood Urea Nitrogen 22H, Creatinine 4.5H, Estimat Glomerular 

Filtration Rate 15.8, Glucose Level 135H, Calcium Level 8.7


Height (Feet):  6


Height (Inches):  2.00


Weight (Pounds):  275


Cardiovascular:  normal rate


Respiratory/Chest:  lungs clear


Extremities:  moderate edema











Jude Haley MD Sep 9, 2018 11:28

## 2018-09-10 VITALS — SYSTOLIC BLOOD PRESSURE: 138 MMHG | DIASTOLIC BLOOD PRESSURE: 73 MMHG

## 2018-09-10 VITALS — DIASTOLIC BLOOD PRESSURE: 58 MMHG | SYSTOLIC BLOOD PRESSURE: 134 MMHG

## 2018-09-10 VITALS — SYSTOLIC BLOOD PRESSURE: 163 MMHG | DIASTOLIC BLOOD PRESSURE: 91 MMHG

## 2018-09-10 VITALS — DIASTOLIC BLOOD PRESSURE: 88 MMHG | SYSTOLIC BLOOD PRESSURE: 169 MMHG

## 2018-09-10 VITALS — SYSTOLIC BLOOD PRESSURE: 140 MMHG | DIASTOLIC BLOOD PRESSURE: 65 MMHG

## 2018-09-10 VITALS — SYSTOLIC BLOOD PRESSURE: 143 MMHG | DIASTOLIC BLOOD PRESSURE: 72 MMHG

## 2018-09-10 LAB
ADD MANUAL DIFF: NO
ANION GAP SERPL CALC-SCNC: 9 MMOL/L (ref 5–15)
BASOPHILS NFR BLD AUTO: 0.9 % (ref 0–2)
BUN SERPL-MCNC: 26 MG/DL (ref 7–18)
CALCIUM SERPL-MCNC: 8.4 MG/DL (ref 8.5–10.1)
CHLORIDE SERPL-SCNC: 107 MMOL/L (ref 98–107)
CO2 SERPL-SCNC: 23 MMOL/L (ref 21–32)
CREAT SERPL-MCNC: 5.2 MG/DL (ref 0.55–1.3)
EOSINOPHIL NFR BLD AUTO: 3.5 % (ref 0–3)
ERYTHROCYTE [DISTWIDTH] IN BLOOD BY AUTOMATED COUNT: 16.9 % (ref 11.6–14.8)
HCT VFR BLD CALC: 31.3 % (ref 42–52)
HGB BLD-MCNC: 10 G/DL (ref 14.2–18)
LYMPHOCYTES NFR BLD AUTO: 15.3 % (ref 20–45)
MCV RBC AUTO: 95 FL (ref 80–99)
MONOCYTES NFR BLD AUTO: 12.2 % (ref 1–10)
NEUTROPHILS NFR BLD AUTO: 68.1 % (ref 45–75)
PLATELET # BLD: 246 K/UL (ref 150–450)
POTASSIUM SERPL-SCNC: 3.8 MMOL/L (ref 3.5–5.1)
RBC # BLD AUTO: 3.29 M/UL (ref 4.7–6.1)
SODIUM SERPL-SCNC: 139 MMOL/L (ref 136–145)
WBC # BLD AUTO: 9 K/UL (ref 4.8–10.8)

## 2018-09-10 RX ADMIN — INSULIN ASPART SCH UNITS: 100 INJECTION, SOLUTION INTRAVENOUS; SUBCUTANEOUS at 17:17

## 2018-09-10 RX ADMIN — ASPIRIN 81 MG SCH MG: 81 TABLET ORAL at 08:32

## 2018-09-10 RX ADMIN — HEPARIN SODIUM SCH UNITS: 5000 INJECTION INTRAVENOUS; SUBCUTANEOUS at 20:30

## 2018-09-10 RX ADMIN — METOPROLOL TARTRATE SCH MG: 50 TABLET, FILM COATED ORAL at 20:26

## 2018-09-10 RX ADMIN — BENZTROPINE MESYLATE SCH MG: 1 TABLET ORAL at 08:33

## 2018-09-10 RX ADMIN — HYDRALAZINE HYDROCHLORIDE SCH MG: 50 TABLET ORAL at 02:57

## 2018-09-10 RX ADMIN — ATORVASTATIN CALCIUM SCH MG: 20 TABLET, FILM COATED ORAL at 20:27

## 2018-09-10 RX ADMIN — INSULIN ASPART SCH UNITS: 100 INJECTION, SOLUTION INTRAVENOUS; SUBCUTANEOUS at 20:29

## 2018-09-10 RX ADMIN — HYDRALAZINE HYDROCHLORIDE SCH MG: 50 TABLET ORAL at 09:10

## 2018-09-10 RX ADMIN — ISOSORBIDE MONONITRATE SCH MG: 30 TABLET, EXTENDED RELEASE ORAL at 08:33

## 2018-09-10 RX ADMIN — METOPROLOL TARTRATE SCH MG: 50 TABLET, FILM COATED ORAL at 08:33

## 2018-09-10 RX ADMIN — INSULIN ASPART SCH UNITS: 100 INJECTION, SOLUTION INTRAVENOUS; SUBCUTANEOUS at 12:27

## 2018-09-10 RX ADMIN — Medication SCH TAB: at 08:33

## 2018-09-10 RX ADMIN — DOXAZOSIN MESYLATE SCH MG: 4 TABLET ORAL at 20:27

## 2018-09-10 RX ADMIN — HYDRALAZINE HYDROCHLORIDE SCH MG: 50 TABLET ORAL at 17:16

## 2018-09-10 RX ADMIN — DOXAZOSIN MESYLATE SCH MG: 4 TABLET ORAL at 08:33

## 2018-09-10 RX ADMIN — HEPARIN SODIUM SCH UNITS: 5000 INJECTION INTRAVENOUS; SUBCUTANEOUS at 08:35

## 2018-09-10 RX ADMIN — INSULIN ASPART SCH UNITS: 100 INJECTION, SOLUTION INTRAVENOUS; SUBCUTANEOUS at 06:37

## 2018-09-10 RX ADMIN — ERYTHROPOIETIN SCH UNITS: 20000 INJECTION, SOLUTION INTRAVENOUS; SUBCUTANEOUS at 20:30

## 2018-09-10 RX ADMIN — OXYMETAZOLINE HYDROCHLORIDE PRN SPRAY: 0.05 SPRAY NASAL at 09:10

## 2018-09-10 NOTE — GENERAL PROGRESS NOTE
Assessment/Plan


Problem List:  


(1) UTI (urinary tract infection)


ICD Codes:  N39.0 - Urinary tract infection, site not specified


SNOMED:  36177894


(2) Malignant hypertension (arteriolar nephrosclerosis)


ICD Codes:  I12.9 - Hypertensive chronic kidney disease with stage 1 through 

stage 4 chronic kidney disease, or unspecified chronic kidney disease


SNOMED:  90538971


(3) Diabetes


ICD Codes:  E11.9 - Type 2 diabetes mellitus without complications


SNOMED:  65894048


(4) ESRD (end stage renal disease)


ICD Codes:  N18.6 - End stage renal disease


SNOMED:  38256181, 796778684, 454957173


(5) Elevated troponin


ICD Codes:  R74.8 - Abnormal levels of other serum enzymes


SNOMED:  950877544, 290682936, 282243491


(6) Anemia in chronic kidney disease


ICD Codes:  N18.9 - Chronic kidney disease, unspecified; D63.1 - Anemia in 

chronic kidney disease


SNOMED:  470735195, 609477748


(7) CKD (chronic kidney disease) stage 5, GFR less than 15 ml/min


ICD Codes:  N18.5 - Chronic kidney disease, stage 5


SNOMED:  240379835


Status:  stable


Assessment/Plan


HD with UF per renal.


AVF to be scheduled at a later date


epo/iron


BP still high. difficult to control. on multiple meds


add acei


BP rx per cards


o2 as needed





Subjective


ROS Limited/Unobtainable:  No


Constitutional:  Reports: no symptoms, malaise, weakness


HEENT:  Reports: no symptoms


Cardiovascular:  Reports: edema


Respiratory:  Reports: shortness of breath


Gastrointestinal/Abdominal:  Reports: no symptoms


Genitourinary:  Reports: no symptoms


Neurologic/Psychiatric:  Reports: no symptoms


Endocrine:  Reports: no symptoms


Hematologic/Lymphatic:  Reports: anemia


Allergies:  


Coded Allergies:  


     No Known Allergies (Unverified , 9/25/17)


All Systems:  reviewed and negative except above


Subjective


no events. decrease sob and edema. BP remains elevated and very difficult to 

control. on multiple meds.





Objective





Last 24 Hour Vital Signs








  Date Time  Temp Pulse Resp B/P (MAP) Pulse Ox O2 Delivery O2 Flow Rate FiO2


 


9/10/18 09:10    163/91    


 


9/10/18 09:00      Room Air  


 


9/10/18 08:33    163/91    


 


9/10/18 08:33  61  163/91    


 


9/10/18 08:32  61  163/91    


 


9/10/18 08:00 97.9 61 21 163/91 (115) 96   





 97.9       


 


9/10/18 08:00  65      


 


9/10/18 04:00 98.1 59 21 134/58 (83) 96   





 98.1       


 


9/10/18 04:00  53      


 


9/10/18 02:57    155/79    


 


9/10/18 00:00  66      


 


9/10/18 00:00 99.3 64 20 140/65 (90) 96   





 99.3       


 


9/9/18 21:29  77  155/71    


 


9/9/18 21:00      Room Air  


 


9/9/18 20:00 99.3 77 20 155/71 (99) 94   





 99.3       


 


9/9/18 20:00  78      


 


9/9/18 17:28    154/79    


 


9/9/18 17:27  66  154/79    


 


9/9/18 16:00  66      


 


9/9/18 16:00 98.1 61 18 154/79 (104) 94   





 98.1       

















Intake and Output  


 


 9/9/18 9/10/18





 19:00 07:00


 


Intake Total 1080 ml 


 


Balance 1080 ml 


 


  


 


Intake Oral 1080 ml 


 


# Voids 1 4








Laboratory Tests


9/10/18 05:40: 


White Blood Count 9.0, Red Blood Count 3.29L, Hemoglobin 10.0L, Hematocrit 31.3L

, Mean Corpuscular Volume 95, Mean Corpuscular Hemoglobin 30.3, Mean 

Corpuscular Hemoglobin Concent 31.8L, Red Cell Distribution Width 16.9H, 

Platelet Count 246, Mean Platelet Volume 6.5, Neutrophils (%) (Auto) 68.1, 

Lymphocytes (%) (Auto) 15.3L, Monocytes (%) (Auto) 12.2H, Eosinophils (%) (Auto

) 3.5H, Basophils (%) (Auto) 0.9, Sodium Level 139, Potassium Level 3.8, 

Chloride Level 107, Carbon Dioxide Level 23, Anion Gap 9, Blood Urea Nitrogen 

26H, Creatinine 5.2H, Estimat Glomerular Filtration Rate 13.3, Glucose Level 

131H, Calcium Level 8.4L


Height (Feet):  6


Height (Inches):  2.00


Weight (Pounds):  275


Objective


General Appearance:  WD/WN


Neck:  supple


Cardiovascular:  regular rhythm


Respiratory/Chest:  chest wall non-tender, lungs clear, normal breath sounds


Abdomen:  normal bowel sounds, non tender, soft, no organomegaly


Edema:  no edema noted Arm (L), no edema noted Arm (R), no edema noted Leg (L), 

no edema noted Leg (R), no edema noted Pedal (L), no edema noted Pedal (R), no 

edema noted Generalized


Edema:  trace edema


Neurologic:  CNs II-XII grossly normal, no motor/sensory deficits, alert, 

oriented x 3, responsive


Skin:  normal pigmentation


Lymphatic:  normal anterior cervical (L), normal anterior cervical (R), normal 

posterior cervical (L), normal posterior cervical (R), normal submandibular (L)

, normal submandibular (R), normal supraclavicular (L), normal supraclavicular (

R), normal axillary (L), normal axillary (R), normal inguinal (L), normal 

inguinal (R), normal other











Michael Nunez MD Sep 10, 2018 13:11

## 2018-09-10 NOTE — NEPHROLOGY PROGRESS NOTE
Assessment/Plan


Problem List:  


(1) ESRD (end stage renal disease)


(2) Elevated troponin


(3) Nephrotic syndrome


(4) CHF (congestive heart failure)


Plan


HD on Tuesday


cont Epogen


cont Nephrovite


Discussed with RN





Subjective


Subjective


feels ok





Objective


Objective





Last 24 Hour Vital Signs








  Date Time  Temp Pulse Resp B/P (MAP) Pulse Ox O2 Delivery O2 Flow Rate FiO2


 


9/10/18 09:10    163/91    


 


9/10/18 09:00      Room Air  


 


9/10/18 08:33    163/91    


 


9/10/18 08:33  61  163/91    


 


9/10/18 08:32  61  163/91    


 


9/10/18 08:00 97.9 61 21 163/91 (115) 96   





 97.9       


 


9/10/18 08:00  65      


 


9/10/18 04:00 98.1 59 21 134/58 (83) 96   





 98.1       


 


9/10/18 04:00  53      


 


9/10/18 02:57    155/79    


 


9/10/18 00:00  66      


 


9/10/18 00:00 99.3 64 20 140/65 (90) 96   





 99.3       


 


9/9/18 21:29  77  155/71    


 


9/9/18 21:00      Room Air  


 


9/9/18 20:00 99.3 77 20 155/71 (99) 94   





 99.3       


 


9/9/18 20:00  78      


 


9/9/18 17:28    154/79    


 


9/9/18 17:27  66  154/79    


 


9/9/18 16:00  66      


 


9/9/18 16:00 98.1 61 18 154/79 (104) 94   





 98.1       


 


9/9/18 12:00 98.4 55 20 163/86 (111) 93   





 98.4       


 


9/9/18 12:00  58      

















Intake and Output  


 


 9/9/18 9/10/18





 19:00 07:00


 


Intake Total 1080 ml 


 


Balance 1080 ml 


 


  


 


Intake Oral 1080 ml 


 


# Voids 1 4








Laboratory Tests


9/10/18 05:40: 


White Blood Count 9.0, Red Blood Count 3.29L, Hemoglobin 10.0L, Hematocrit 31.3L

, Mean Corpuscular Volume 95, Mean Corpuscular Hemoglobin 30.3, Mean 

Corpuscular Hemoglobin Concent 31.8L, Red Cell Distribution Width 16.9H, 

Platelet Count 246, Mean Platelet Volume 6.5, Neutrophils (%) (Auto) 68.1, 

Lymphocytes (%) (Auto) 15.3L, Monocytes (%) (Auto) 12.2H, Eosinophils (%) (Auto

) 3.5H, Basophils (%) (Auto) 0.9, Sodium Level 139, Potassium Level 3.8, 

Chloride Level 107, Carbon Dioxide Level 23, Anion Gap 9, Blood Urea Nitrogen 

26H, Creatinine 5.2H, Estimat Glomerular Filtration Rate 13.3, Glucose Level 

131H, Calcium Level 8.4L


Height (Feet):  6


Height (Inches):  2.00


Weight (Pounds):  275


Cardiovascular:  normal rate


Respiratory/Chest:  lungs clear


Extremities:  moderate edema











Jude Haley MD Sep 10, 2018 11:16

## 2018-09-11 VITALS — SYSTOLIC BLOOD PRESSURE: 152 MMHG | DIASTOLIC BLOOD PRESSURE: 65 MMHG

## 2018-09-11 VITALS — SYSTOLIC BLOOD PRESSURE: 138 MMHG | DIASTOLIC BLOOD PRESSURE: 67 MMHG

## 2018-09-11 VITALS — SYSTOLIC BLOOD PRESSURE: 175 MMHG | DIASTOLIC BLOOD PRESSURE: 110 MMHG

## 2018-09-11 VITALS — SYSTOLIC BLOOD PRESSURE: 159 MMHG | DIASTOLIC BLOOD PRESSURE: 85 MMHG

## 2018-09-11 VITALS — SYSTOLIC BLOOD PRESSURE: 146 MMHG | DIASTOLIC BLOOD PRESSURE: 74 MMHG

## 2018-09-11 VITALS — SYSTOLIC BLOOD PRESSURE: 155 MMHG | DIASTOLIC BLOOD PRESSURE: 79 MMHG

## 2018-09-11 VITALS — DIASTOLIC BLOOD PRESSURE: 73 MMHG | SYSTOLIC BLOOD PRESSURE: 159 MMHG

## 2018-09-11 LAB
ADD MANUAL DIFF: NO
ANION GAP SERPL CALC-SCNC: 9 MMOL/L (ref 5–15)
BASOPHILS NFR BLD AUTO: 1.1 % (ref 0–2)
BUN SERPL-MCNC: 34 MG/DL (ref 7–18)
CALCIUM SERPL-MCNC: 8.5 MG/DL (ref 8.5–10.1)
CHLORIDE SERPL-SCNC: 106 MMOL/L (ref 98–107)
CO2 SERPL-SCNC: 22 MMOL/L (ref 21–32)
CREAT SERPL-MCNC: 5.5 MG/DL (ref 0.55–1.3)
EOSINOPHIL NFR BLD AUTO: 3.9 % (ref 0–3)
ERYTHROCYTE [DISTWIDTH] IN BLOOD BY AUTOMATED COUNT: 16.9 % (ref 11.6–14.8)
HCT VFR BLD CALC: 33.3 % (ref 42–52)
HGB BLD-MCNC: 10.5 G/DL (ref 14.2–18)
LYMPHOCYTES NFR BLD AUTO: 12 % (ref 20–45)
MCV RBC AUTO: 95 FL (ref 80–99)
MONOCYTES NFR BLD AUTO: 10.4 % (ref 1–10)
NEUTROPHILS NFR BLD AUTO: 72.7 % (ref 45–75)
PHOSPHATE SERPL-MCNC: 4.2 MG/DL (ref 2.5–4.9)
PLATELET # BLD: 291 K/UL (ref 150–450)
POTASSIUM SERPL-SCNC: 4.1 MMOL/L (ref 3.5–5.1)
RBC # BLD AUTO: 3.5 M/UL (ref 4.7–6.1)
SODIUM SERPL-SCNC: 137 MMOL/L (ref 136–145)
WBC # BLD AUTO: 9.1 K/UL (ref 4.8–10.8)

## 2018-09-11 RX ADMIN — OXYMETAZOLINE HYDROCHLORIDE PRN SPRAY: 0.05 SPRAY NASAL at 00:54

## 2018-09-11 RX ADMIN — ISOSORBIDE MONONITRATE SCH MG: 30 TABLET, EXTENDED RELEASE ORAL at 09:00

## 2018-09-11 RX ADMIN — LISINOPRIL SCH MG: 10 TABLET ORAL at 09:00

## 2018-09-11 RX ADMIN — INSULIN ASPART SCH UNITS: 100 INJECTION, SOLUTION INTRAVENOUS; SUBCUTANEOUS at 11:55

## 2018-09-11 RX ADMIN — ISOSORBIDE MONONITRATE SCH MG: 30 TABLET, EXTENDED RELEASE ORAL at 11:48

## 2018-09-11 RX ADMIN — ASPIRIN 81 MG SCH MG: 81 TABLET ORAL at 08:57

## 2018-09-11 RX ADMIN — HYDRALAZINE HYDROCHLORIDE SCH MG: 50 TABLET ORAL at 10:00

## 2018-09-11 RX ADMIN — HYDRALAZINE HYDROCHLORIDE SCH MG: 50 TABLET ORAL at 11:46

## 2018-09-11 RX ADMIN — HYDRALAZINE HYDROCHLORIDE SCH MG: 50 TABLET ORAL at 02:18

## 2018-09-11 RX ADMIN — ATORVASTATIN CALCIUM SCH MG: 20 TABLET, FILM COATED ORAL at 23:29

## 2018-09-11 RX ADMIN — HYDRALAZINE HYDROCHLORIDE SCH MG: 50 TABLET ORAL at 18:00

## 2018-09-11 RX ADMIN — DOXAZOSIN MESYLATE SCH MG: 4 TABLET ORAL at 11:45

## 2018-09-11 RX ADMIN — METOPROLOL TARTRATE SCH MG: 50 TABLET, FILM COATED ORAL at 08:56

## 2018-09-11 RX ADMIN — HEPARIN SODIUM SCH UNITS: 5000 INJECTION INTRAVENOUS; SUBCUTANEOUS at 23:32

## 2018-09-11 RX ADMIN — INSULIN ASPART SCH UNITS: 100 INJECTION, SOLUTION INTRAVENOUS; SUBCUTANEOUS at 17:59

## 2018-09-11 RX ADMIN — HEPARIN SODIUM SCH UNITS: 5000 INJECTION INTRAVENOUS; SUBCUTANEOUS at 08:58

## 2018-09-11 RX ADMIN — OXYMETAZOLINE HYDROCHLORIDE PRN SPRAY: 0.05 SPRAY NASAL at 08:56

## 2018-09-11 RX ADMIN — LISINOPRIL SCH MG: 10 TABLET ORAL at 11:50

## 2018-09-11 RX ADMIN — INSULIN ASPART SCH UNITS: 100 INJECTION, SOLUTION INTRAVENOUS; SUBCUTANEOUS at 23:33

## 2018-09-11 RX ADMIN — DOXAZOSIN MESYLATE SCH MG: 4 TABLET ORAL at 09:00

## 2018-09-11 RX ADMIN — BENZTROPINE MESYLATE SCH MG: 1 TABLET ORAL at 08:55

## 2018-09-11 RX ADMIN — INSULIN ASPART SCH UNITS: 100 INJECTION, SOLUTION INTRAVENOUS; SUBCUTANEOUS at 06:07

## 2018-09-11 RX ADMIN — METOPROLOL TARTRATE SCH MG: 50 TABLET, FILM COATED ORAL at 23:38

## 2018-09-11 RX ADMIN — Medication SCH TAB: at 08:56

## 2018-09-11 RX ADMIN — DOXAZOSIN MESYLATE SCH MG: 4 TABLET ORAL at 23:31

## 2018-09-11 RX ADMIN — OXYMETAZOLINE HYDROCHLORIDE PRN SPRAY: 0.05 SPRAY NASAL at 20:04

## 2018-09-11 NOTE — PROGRESS NOTE
DATE:  09/09/2018



CARDIOLOGY PROGRESS NOTE



Late entry for 09/09/2018.



SUBJECTIVE:  The patient has PermCath, which is being used for

hemodialysis.  He denies any discomfort or pain from the site.  He is

somewhat depressed about being so "sick.  He is slightly less short of

breath today and he has less edema.  His blood pressure, however, remains

quite labile.



OBJECTIVE:

VITAL SIGNS:  Blood pressure 159/85 to 171/85, heart rate 60 to 80, and

respiratory rate 20 to 24.  He is afebrile.

NECK:  Jugular venous pressure normal.

LUNGS:  Few rales.

CARDIAC:  Regular rhythm and rate.  Normal S1 and S2 with a fourth heart

sound.

EXTREMITIES:  With 1+ edema.



IMPRESSION:

1. Slow progress.

2. Multiorgan system disease.

3. End-stage renal disease, now on hemodialysis with presentation

complicated by acute myocardial infarction.

4. Complications of heart failure.

5. Labile blood pressure.



PLAN:

1. Continue fluid removal once euvolemic.

2. Discharge on a titrated cardiovascular regimen.

3. His AV fistula placement to follow.









  ______________________________________________

  Edmar Munguia M.D.





DR:  KALLIE

D:  09/11/2018 01:48

T:  09/11/2018 01:58

JOB#:  [id number]

CC:

## 2018-09-11 NOTE — PROGRESS NOTE
DATE:  09/08/2018



CARDIOLOGY PROGRESS NOTE



Late entry for 09/08/2018.



SUBJECTIVE:  The patient still is short of breath with ambulation and gets

fatigued very easily.  His leg swelling is better, but not at baseline.



OBJECTIVE:

VITAL SIGNS:  Blood pressure 113/71, pulse 55, respirations 18, and

afebrile.

LUNGS:  Few rales.

HEART:  Regular rhythm and rate.  Normal S1, S2 with a fourth heart

sound.

ABDOMEN:  Soft.

EXTREMITIES:  1+ dependent edema.



IMPRESSION:

1. End-stage renal disease.

2. Acute on chronic diastolic congestive heart failure.

3. Acute myocardial infarction.  No reversible ischemia based on recent

myocardial perfusion scan.

4. Anemia of chronic kidney disease.

5. Hypertensive heart disease now with decreasing blood pressure trend

upon initiation of hemodialysis and ultrafiltration.

6. Sinus bradycardia, on beta-blockers.



PLAN:

1. Continue hemodialysis with ultrafiltration.

2. Continue titration of cardiovascular regimen with changes in volume

status.

3. PermCath is being used.

4. AV fistula to be scheduled for long-term dialysis.









  ______________________________________________

  Edmar Munguia M.D. DR:  MAY

D:  09/11/2018 01:40

T:  09/11/2018 03:08

JOB#:  [id number]

CC:

## 2018-09-11 NOTE — PROGRESS NOTE
DATE:  09/07/2018



CARDIOLOGY PROGRESS NOTE



SUBJECTIVE:  The patient has started on hemodialysis with ultrafiltration

via PermCath.  He is less short of breath.  He has no chest pain.



OBJECTIVE:

VITAL SIGNS:  Blood pressure 139/76, pulse 57, and respirations 17.

NECK:  Jugular venous pressure is still elevated.

LUNGS:  With few rales.

CARDIAC:  Regular rhythm and rate.  Normal S1 and S2 with a fourth heart

sound.  Catheter site is without drainage or erythema.

EXTREMITIES:  With trace dependent edema.



IMPRESSION:

1. End-stage renal disease, now on hemodialysis.

2. Acute myocardial infarction with no signs of ischemia on recent

myocardial perfusion scan.

3. Hypertensive heart disease with improving blood pressure control.

4. Sinus bradycardia, on beta-blockers, asymptomatic.

5. Anemia of chronic kidney disease status post transfusion.



PLAN:

1. Continue hemodialysis with ultrafiltration.

2. Continue titration of anti-failure and antihypertensive regimen.

3. Observe for worsening bradycardia and adjust therapy accordingly.

4. Epogen and iron replacement.

5. Permanent AV fistula access for dialysis to follow.









  ______________________________________________

  Edmar Munguia M.D.





DR:  ALEXSANDRA

D:  09/11/2018 00:58

T:  09/11/2018 01:14

JOB#:  [id number]

CC:

## 2018-09-11 NOTE — GENERAL PROGRESS NOTE
Assessment/Plan


Problem List:  


(1) UTI (urinary tract infection)


ICD Codes:  N39.0 - Urinary tract infection, site not specified


SNOMED:  67297795


(2) Malignant hypertension (arteriolar nephrosclerosis)


ICD Codes:  I12.9 - Hypertensive chronic kidney disease with stage 1 through 

stage 4 chronic kidney disease, or unspecified chronic kidney disease


SNOMED:  94724441


(3) Diabetes


ICD Codes:  E11.9 - Type 2 diabetes mellitus without complications


SNOMED:  64098076


(4) ESRD (end stage renal disease)


ICD Codes:  N18.6 - End stage renal disease


SNOMED:  80697611, 610235701, 118203203


(5) Elevated troponin


ICD Codes:  R74.8 - Abnormal levels of other serum enzymes


SNOMED:  661840466, 987757623, 142277596


(6) Anemia in chronic kidney disease


ICD Codes:  N18.9 - Chronic kidney disease, unspecified; D63.1 - Anemia in 

chronic kidney disease


SNOMED:  192180285, 187126880


(7) CKD (chronic kidney disease) stage 5, GFR less than 15 ml/min


ICD Codes:  N18.5 - Chronic kidney disease, stage 5


SNOMED:  100214511


Status:  stable, progressing


Assessment/Plan


HD with UF per renal.


AVF to be scheduled at a later date


epo/iron


BP still high. difficult to control. on multiple meds


increase acei


BP rx per cards


o2 as needed





Subjective


ROS Limited/Unobtainable:  No


Constitutional:  Reports: weakness


HEENT:  Reports: no symptoms


Cardiovascular:  Reports: edema


Respiratory:  Reports: shortness of breath


Gastrointestinal/Abdominal:  Reports: no symptoms


Genitourinary:  Reports: no symptoms


Neurologic/Psychiatric:  Reports: no symptoms


Endocrine:  Reports: no symptoms


Hematologic/Lymphatic:  Reports: no symptoms


Allergies:  


Coded Allergies:  


     No Known Allergies (Unverified , 9/25/17)


All Systems:  reviewed and negative except above


Subjective


no events. decrease sob and edema. BP remains elevated and very difficult to 

control. on multiple meds. 


norvasc decreased.





Objective





Last 24 Hour Vital Signs








  Date Time  Temp Pulse Resp B/P (MAP) Pulse Ox O2 Delivery O2 Flow Rate FiO2


 


9/11/18 13:30  53  146/74 (98)    


 


9/11/18 12:00  60      


 


9/11/18 12:00 98.4 66 20 175/110 (131) 97   





 98.4       


 


9/11/18 11:50    175/110    


 


9/11/18 11:49  107  175/110    


 


9/11/18 11:48    175/110    


 


9/11/18 11:46    175/79    


 


9/11/18 09:00      Room Air  


 


9/11/18 08:56  61  159/73    


 


9/11/18 08:00  62      


 


9/11/18 08:00 98.4 61 20 159/73 (101) 96   





 98.4       


 


9/11/18 04:00  51      


 


9/11/18 04:00 97.2 51 25 155/79 (104) 95   





 97.2       


 


9/11/18 02:18    156/87    


 


9/11/18 00:00  58      


 


9/11/18 00:00 97.4 60 23 138/67 (90) 95   





 97.4       


 


9/10/18 21:00      Room Air  


 


9/10/18 20:26  71  169/88    


 


9/10/18 20:00  67      


 


9/10/18 20:00 97.7 71 20 169/88 (115) 94   





 97.7       


 


9/10/18 17:16    138/73    


 


9/10/18 17:16  69  138/73    


 


9/10/18 16:00  69      


 


9/10/18 16:00 97.7 91 18 138/73 (94) 97   





 97.7       

















Intake and Output  


 


 9/10/18 9/11/18





 19:00 07:00


 


Intake Total 450 ml 


 


Balance 450 ml 


 


  


 


Intake Oral 450 ml 


 


# Voids  1


 


# Bowel Movements 1 








Laboratory Tests


9/11/18 09:35: 


White Blood Count 9.1, Red Blood Count 3.50L, Hemoglobin 10.5L, Hematocrit 33.3L

, Mean Corpuscular Volume 95, Mean Corpuscular Hemoglobin 30.1, Mean 

Corpuscular Hemoglobin Concent 31.6L, Red Cell Distribution Width 16.9H, 

Platelet Count 291, Mean Platelet Volume 7.0, Neutrophils (%) (Auto) 72.7, 

Lymphocytes (%) (Auto) 12.0L, Monocytes (%) (Auto) 10.4H, Eosinophils (%) (Auto

) 3.9H, Basophils (%) (Auto) 1.1, Sodium Level 137, Potassium Level 4.1, 

Chloride Level 106, Carbon Dioxide Level 22, Anion Gap 9, Blood Urea Nitrogen 

34H, Creatinine 5.5H, Estimat Glomerular Filtration Rate 12.5, Glucose Level 

174H, Calcium Level 8.5, Phosphorus Level 4.2


Height (Feet):  6


Height (Inches):  2.00


Weight (Pounds):  275


Objective


General Appearance:  WD/WN


Neck:  supple


Cardiovascular:  regular rhythm


Respiratory/Chest:  chest wall non-tender, lungs clear, normal breath sounds


Abdomen:  normal bowel sounds, non tender, soft, no organomegaly


Edema:  no edema noted Arm (L), no edema noted Arm (R), no edema noted Leg (L), 

no edema noted Leg (R), no edema noted Pedal (L), no edema noted Pedal (R), no 

edema noted Generalized


Edema:  trace edema


Neurologic:  CNs II-XII grossly normal, no motor/sensory deficits, alert, 

oriented x 3, responsive


Skin:  normal pigmentation


Lymphatic:  normal anterior cervical (L), normal anterior cervical (R), normal 

posterior cervical (L), normal posterior cervical (R), normal submandibular (L)

, normal submandibular (R), normal supraclavicular (L), normal supraclavicular (

R), normal axillary (L), normal axillary (R), normal inguinal (L), normal 

inguinal (R), normal other











Michael Nunez MD Sep 11, 2018 15:56

## 2018-09-11 NOTE — NEPHROLOGY PROGRESS NOTE
Assessment/Plan


Problem List:  


(1) ESRD (end stage renal disease)


(2) Elevated troponin


(3) Nephrotic syndrome


(4) CHF (congestive heart failure)


Plan


was dialyzed today


cont Epogen


cont Nephrovite


follow labs





Subjective


Subjective


feels ok





Objective


Objective





Last 24 Hour Vital Signs








  Date Time  Temp Pulse Resp B/P (MAP) Pulse Ox O2 Delivery O2 Flow Rate FiO2


 


9/11/18 16:00 98.1 59 20 152/65 (94) 95   





 98.1       


 


9/11/18 13:30  53  146/74 (98)    


 


9/11/18 12:00  60      


 


9/11/18 12:00 98.4 66 20 175/110 (131) 97   





 98.4       


 


9/11/18 11:50    175/110    


 


9/11/18 11:49  107  175/110    


 


9/11/18 11:48    175/110    


 


9/11/18 11:46    175/79    


 


9/11/18 09:00      Room Air  


 


9/11/18 08:56  61  159/73    


 


9/11/18 08:00  62      


 


9/11/18 08:00 98.4 61 20 159/73 (101) 96   





 98.4       


 


9/11/18 04:00  51      


 


9/11/18 04:00 97.2 51 25 155/79 (104) 95   





 97.2       


 


9/11/18 02:18    156/87    


 


9/11/18 00:00  58      


 


9/11/18 00:00 97.4 60 23 138/67 (90) 95   





 97.4       


 


9/10/18 21:00      Room Air  


 


9/10/18 20:26  71  169/88    


 


9/10/18 20:00  67      


 


9/10/18 20:00 97.7 71 20 169/88 (115) 94   





 97.7       


 


9/10/18 17:16    138/73    


 


9/10/18 17:16  69  138/73    

















Intake and Output  


 


 9/10/18 9/11/18





 19:00 07:00


 


Intake Total 450 ml 


 


Balance 450 ml 


 


  


 


Intake Oral 450 ml 


 


# Voids  1


 


# Bowel Movements 1 








Laboratory Tests


9/11/18 09:35: 


White Blood Count 9.1, Red Blood Count 3.50L, Hemoglobin 10.5L, Hematocrit 33.3L

, Mean Corpuscular Volume 95, Mean Corpuscular Hemoglobin 30.1, Mean 

Corpuscular Hemoglobin Concent 31.6L, Red Cell Distribution Width 16.9H, 

Platelet Count 291, Mean Platelet Volume 7.0, Neutrophils (%) (Auto) 72.7, 

Lymphocytes (%) (Auto) 12.0L, Monocytes (%) (Auto) 10.4H, Eosinophils (%) (Auto

) 3.9H, Basophils (%) (Auto) 1.1, Sodium Level 137, Potassium Level 4.1, 

Chloride Level 106, Carbon Dioxide Level 22, Anion Gap 9, Blood Urea Nitrogen 

34H, Creatinine 5.5H, Estimat Glomerular Filtration Rate 12.5, Glucose Level 

174H, Calcium Level 8.5, Phosphorus Level 4.2


Height (Feet):  6


Height (Inches):  2.00


Weight (Pounds):  275


Cardiovascular:  normal rate


Respiratory/Chest:  lungs clear


Extremities:  moderate edema











Jude Haley MD Sep 11, 2018 16:27

## 2018-09-11 NOTE — PROGRESS NOTE
DATE:  09/10/2018



CARDIOLOGY PROGRESS NOTE



SUBJECTIVE:  The patient is improved with regard to shortness of breath and

edema.  He remains on hemodialysis with ultrafiltration.  He continues to

have labile blood pressure readings.



OBJECTIVE:

VITAL SIGNS:  Blood pressure up to 163/91, heart rate 61, and respiratory

rate 21.

LUNGS:  Clear.

CARDIAC:  Regular.  Normal S1 and S2 with a fourth heart sound.

ABDOMEN:  Soft.

EXTREMITIES:  With 1+ edema.



LABORATORY DATA:  White count 9 and hemoglobin 10.  Sodium 139, potassium

3.8, BUN 26, and creatinine 5.2.



IMPRESSION:

1. Acute on chronic diastolic congestive heart failure.

2. Lower extremity edema, may be due to nephrotic syndrome as well as

high-dose amlodipine.

3. Hypertensive heart disease, still with labile blood pressure.

4. End-stage renal disease, now on hemodialysis.

5. Acute myocardial infarction with no reversible ischemia noted on

recent myocardial perfusion scan.

6. Anemia of chronic kidney disease, on Epogen and iron, status post

transfusion.



PLAN:

1. Continue fluid removal.

2. Try lower dose of amlodipine.

3. Titrate cardiovascular regimen accordingly.

4. Discharge planning.









  ______________________________________________

  Edmar Munguia M.D. DR:  JUAN

D:  09/11/2018 02:04

T:  09/11/2018 03:49

JOB#:  [id number]

CC:

## 2018-09-12 VITALS — DIASTOLIC BLOOD PRESSURE: 83 MMHG | SYSTOLIC BLOOD PRESSURE: 157 MMHG

## 2018-09-12 VITALS — DIASTOLIC BLOOD PRESSURE: 77 MMHG | SYSTOLIC BLOOD PRESSURE: 152 MMHG

## 2018-09-12 VITALS — DIASTOLIC BLOOD PRESSURE: 76 MMHG | SYSTOLIC BLOOD PRESSURE: 166 MMHG

## 2018-09-12 VITALS — DIASTOLIC BLOOD PRESSURE: 74 MMHG | SYSTOLIC BLOOD PRESSURE: 172 MMHG

## 2018-09-12 VITALS — SYSTOLIC BLOOD PRESSURE: 164 MMHG | DIASTOLIC BLOOD PRESSURE: 74 MMHG

## 2018-09-12 VITALS — SYSTOLIC BLOOD PRESSURE: 154 MMHG | DIASTOLIC BLOOD PRESSURE: 75 MMHG

## 2018-09-12 RX ADMIN — DOXAZOSIN MESYLATE SCH MG: 4 TABLET ORAL at 08:46

## 2018-09-12 RX ADMIN — OXYMETAZOLINE HYDROCHLORIDE PRN SPRAY: 0.05 SPRAY NASAL at 08:46

## 2018-09-12 RX ADMIN — INSULIN ASPART SCH UNITS: 100 INJECTION, SOLUTION INTRAVENOUS; SUBCUTANEOUS at 17:18

## 2018-09-12 RX ADMIN — Medication SCH TAB: at 08:45

## 2018-09-12 RX ADMIN — ASPIRIN 81 MG SCH MG: 81 TABLET ORAL at 08:46

## 2018-09-12 RX ADMIN — ATORVASTATIN CALCIUM SCH MG: 20 TABLET, FILM COATED ORAL at 21:07

## 2018-09-12 RX ADMIN — LISINOPRIL SCH MG: 20 TABLET ORAL at 08:53

## 2018-09-12 RX ADMIN — INSULIN ASPART SCH UNITS: 100 INJECTION, SOLUTION INTRAVENOUS; SUBCUTANEOUS at 06:13

## 2018-09-12 RX ADMIN — ISOSORBIDE MONONITRATE SCH MG: 30 TABLET, EXTENDED RELEASE ORAL at 08:45

## 2018-09-12 RX ADMIN — HYDRALAZINE HYDROCHLORIDE SCH MG: 50 TABLET ORAL at 02:10

## 2018-09-12 RX ADMIN — INSULIN ASPART SCH UNITS: 100 INJECTION, SOLUTION INTRAVENOUS; SUBCUTANEOUS at 11:22

## 2018-09-12 RX ADMIN — METOPROLOL TARTRATE SCH MG: 50 TABLET, FILM COATED ORAL at 08:45

## 2018-09-12 RX ADMIN — HEPARIN SODIUM SCH UNITS: 5000 INJECTION INTRAVENOUS; SUBCUTANEOUS at 21:02

## 2018-09-12 RX ADMIN — BENZTROPINE MESYLATE SCH MG: 1 TABLET ORAL at 08:46

## 2018-09-12 RX ADMIN — HYDRALAZINE HYDROCHLORIDE SCH MG: 50 TABLET ORAL at 17:16

## 2018-09-12 RX ADMIN — HYDRALAZINE HYDROCHLORIDE SCH MG: 50 TABLET ORAL at 10:28

## 2018-09-12 RX ADMIN — INSULIN ASPART SCH UNITS: 100 INJECTION, SOLUTION INTRAVENOUS; SUBCUTANEOUS at 21:03

## 2018-09-12 RX ADMIN — DOXAZOSIN MESYLATE SCH MG: 4 TABLET ORAL at 21:07

## 2018-09-12 RX ADMIN — METOPROLOL TARTRATE SCH MG: 50 TABLET, FILM COATED ORAL at 21:07

## 2018-09-12 RX ADMIN — HEPARIN SODIUM SCH UNITS: 5000 INJECTION INTRAVENOUS; SUBCUTANEOUS at 08:48

## 2018-09-12 NOTE — PROGRESS NOTE
DATE:  09/11/2018



CARDIOLOGY PROGRESS NOTE



SUBJECTIVE:  The patient is scheduled for hemodialysis with ultrafiltration

today.  His shortness of breath is better.



OBJECTIVE:

VITAL SIGNS:  Blood pressure 146/74, pulse 53, and respirations 20.

Monitored sinus bradycardia.

LUNGS:  Clear.

CARDIAC:  Regular.  Normal S1 and S2 with a fourth heart sound.

ABDOMEN:  Soft.

EXTREMITIES:  Trace dependent edema.



LABORATORY DATA:  Potassium is 4.1, bicarbonate 22, BUN 34, creatinine 5.5,

and phosphorus 4.2.  White count 9.1 and hemoglobin 10.5.



IMPRESSION:  Improved.



PLAN:  Discharge planning regarding outpatient hemodialysis and disposition

in progress.  The patient will likely return home with family

assistance.









  ______________________________________________

  Edmar Munguia M.D.





DR:  MIRIAM

D:  09/12/2018 05:28

T:  09/12/2018 06:22

JOB#:  [id number]

CC:

## 2018-09-12 NOTE — GENERAL PROGRESS NOTE
Assessment/Plan


Problem List:  


(1) UTI (urinary tract infection)


ICD Codes:  N39.0 - Urinary tract infection, site not specified


SNOMED:  14283075


(2) Malignant hypertension (arteriolar nephrosclerosis)


ICD Codes:  I12.9 - Hypertensive chronic kidney disease with stage 1 through 

stage 4 chronic kidney disease, or unspecified chronic kidney disease


SNOMED:  74424220


(3) Diabetes


ICD Codes:  E11.9 - Type 2 diabetes mellitus without complications


SNOMED:  30804534


(4) ESRD (end stage renal disease)


ICD Codes:  N18.6 - End stage renal disease


SNOMED:  97536943, 761062236, 255745775


(5) Elevated troponin


ICD Codes:  R74.8 - Abnormal levels of other serum enzymes


SNOMED:  832151442, 324445039, 852222085


(6) Anemia in chronic kidney disease


ICD Codes:  N18.9 - Chronic kidney disease, unspecified; D63.1 - Anemia in 

chronic kidney disease


SNOMED:  355808551, 988451611


(7) CKD (chronic kidney disease) stage 5, GFR less than 15 ml/min


ICD Codes:  N18.5 - Chronic kidney disease, stage 5


SNOMED:  688456758


Status:  stable, progressing


Assessment/Plan


HD with UF per renal.


AVF to be scheduled at a later date


epo/iron


BP still high but better controlled


BP rx per cards


o2 as needed


dc planning- to apt or with sister. to d/w sister





Subjective


ROS Limited/Unobtainable:  No


Constitutional:  Reports: malaise, weakness


HEENT:  Reports: no symptoms


Cardiovascular:  Reports: edema


Respiratory:  Reports: no symptoms


Gastrointestinal/Abdominal:  Reports: no symptoms


Genitourinary:  Reports: no symptoms


Neurologic/Psychiatric:  Reports: no symptoms


Endocrine:  Reports: no symptoms


Hematologic/Lymphatic:  Reports: anemia


Allergies:  


Coded Allergies:  


     No Known Allergies (Unverified , 9/25/17)


All Systems:  reviewed and negative except above


Subjective


no events. decrease sob and edema. BP remains elevated but overall trend is 

improving





Objective





Last 24 Hour Vital Signs








  Date Time  Temp Pulse Resp B/P (MAP) Pulse Ox O2 Delivery O2 Flow Rate FiO2


 


9/12/18 10:28    158/79    


 


9/12/18 09:00      Room Air  


 


9/12/18 08:45  66  157/83    


 


9/12/18 08:45    157/83    


 


9/12/18 08:45  66  157/83    


 


9/12/18 08:00  65      


 


9/12/18 08:00 98.4 66 20 157/83 (107) 93   





 98.4       


 


9/12/18 04:00  55      


 


9/12/18 04:00 96.8 60 22 164/74 (104) 96   





 96.8       


 


9/12/18 02:10    142/63    


 


9/12/18 00:00 98.8 68 20 172/74 (106) 95   





 98.8       


 


9/12/18 00:00  64      


 


9/11/18 23:38  68  174/74    


 


9/11/18 21:00      Room Air  


 


9/11/18 20:00  57      


 


9/11/18 20:00 98.2 67 18 159/85 (109) 95   





 98.2       


 


9/11/18 16:00 98.1 59 20 152/65 (94) 95   





 98.1       


 


9/11/18 16:00  54      


 


9/11/18 13:30  53  146/74 (98)    

















Intake and Output  


 


 9/11/18 9/12/18





 19:00 07:00


 


Intake Total 540 ml 200 ml


 


Balance 540 ml 200 ml


 


  


 


Intake Oral 540 ml 


 


Other  200 ml


 


# Voids 3 4








Height (Feet):  6


Height (Inches):  2.00


Weight (Pounds):  257


Objective


General Appearance:  WD/WN


Neck:  supple


Cardiovascular:  regular rhythm


Respiratory/Chest:  chest wall non-tender, lungs clear, normal breath sounds


Abdomen:  normal bowel sounds, non tender, soft, no organomegaly


Edema:  no edema noted Arm (L), no edema noted Arm (R), no edema noted Leg (L), 

no edema noted Leg (R), no edema noted Pedal (L), no edema noted Pedal (R), no 

edema noted Generalized


Edema:  trace edema


Neurologic:  CNs II-XII grossly normal, no motor/sensory deficits, alert, 

oriented x 3, responsive


Skin:  normal pigmentation


Lymphatic:  normal anterior cervical (L), normal anterior cervical (R), normal 

posterior cervical (L), normal posterior cervical (R), normal submandibular (L)

, normal submandibular (R), normal supraclavicular (L), normal supraclavicular (

R), normal axillary (L), normal axillary (R), normal inguinal (L), normal 

inguinal (R), normal other











Michael Nunez MD Sep 12, 2018 13:07

## 2018-09-12 NOTE — GENERAL PROGRESS NOTE
Assessment/Plan


Problem List:  


(1) Nephrotic syndrome


ICD Codes:  N04.9 - Nephrotic syndrome with unspecified morphologic changes


SNOMED:  32958878


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


ICD Codes:  E13.21 - Other specified diabetes mellitus with diabetic nephropathy


SNOMED:  4725233, 112117926


(3) Malignant hypertension (arteriolar nephrosclerosis)


ICD Codes:  I12.9 - Hypertensive chronic kidney disease with stage 1 through 

stage 4 chronic kidney disease, or unspecified chronic kidney disease


SNOMED:  22717836


(4) Anemia in chronic kidney disease


ICD Codes:  N18.9 - Chronic kidney disease, unspecified; D63.1 - Anemia in 

chronic kidney disease


SNOMED:  749050457, 019109963


(5) CKD (chronic kidney disease) stage 5, GFR less than 15 ml/min


ICD Codes:  N18.5 - Chronic kidney disease, stage 5


SNOMED:  138660606


(6) CHF (congestive heart failure)


ICD Codes:  I50.9 - Heart failure, unspecified


SNOMED:  44694253


Assessment/Plan


improving since initiation of HD, no sob, plan HD 9/13





Subjective


Constitutional:  Reports: weakness


HEENT:  Reports: no symptoms


Cardiovascular:  Reports: no symptoms


Respiratory:  Reports: no symptoms


Gastrointestinal/Abdominal:  Reports: no symptoms


Genitourinary:  Reports: no symptoms


Neurologic/Psychiatric:  Reports: no symptoms


Endocrine:  Reports: no symptoms


Allergies:  


Coded Allergies:  


     No Known Allergies (Unverified , 9/25/17)





Objective





Last 24 Hour Vital Signs








  Date Time  Temp Pulse Resp B/P (MAP) Pulse Ox O2 Delivery O2 Flow Rate FiO2


 


9/12/18 17:16    152/77    


 


9/12/18 16:00 98.2 60 20 152/77 (102) 96   





 98.2       


 


9/12/18 12:00  54      


 


9/12/18 12:00 98.1 60 20 166/76 (106) 96   





 98.1       


 


9/12/18 10:28    158/79    


 


9/12/18 09:00      Room Air  


 


9/12/18 08:45  66  157/83    


 


9/12/18 08:45    157/83    


 


9/12/18 08:45  66  157/83    


 


9/12/18 08:00  65      


 


9/12/18 08:00 98.4 66 20 157/83 (107) 93   





 98.4       


 


9/12/18 04:00  55      


 


9/12/18 04:00 96.8 60 22 164/74 (104) 96   





 96.8       


 


9/12/18 02:10    142/63    


 


9/12/18 00:00 98.8 68 20 172/74 (106) 95   





 98.8       


 


9/12/18 00:00  64      


 


9/11/18 23:38  68  174/74    


 


9/11/18 21:00      Room Air  


 


9/11/18 20:00  57      


 


9/11/18 20:00 98.2 67 18 159/85 (109) 95   





 98.2       

















Intake and Output  


 


 9/11/18 9/12/18





 19:00 07:00


 


Intake Total 540 ml 200 ml


 


Balance 540 ml 200 ml


 


  


 


Intake Oral 540 ml 


 


Other  200 ml


 


# Voids 3 4








Height (Feet):  6


Height (Inches):  2.00


Weight (Pounds):  257


General Appearance:  no apparent distress, alert


EENT:  normal ENT inspection


Neck:  normal alignment


Cardiovascular:  normal rate, regular rhythm


Respiratory/Chest:  lungs clear


Abdomen:  normal bowel sounds, non tender


Edema:  moderate edema


Neurologic:  CNs II-XII grossly normal











MACKENZIE MAY Sep 12, 2018 18:05

## 2018-09-13 VITALS — DIASTOLIC BLOOD PRESSURE: 92 MMHG | SYSTOLIC BLOOD PRESSURE: 176 MMHG

## 2018-09-13 VITALS — DIASTOLIC BLOOD PRESSURE: 90 MMHG | SYSTOLIC BLOOD PRESSURE: 155 MMHG

## 2018-09-13 VITALS — DIASTOLIC BLOOD PRESSURE: 82 MMHG | SYSTOLIC BLOOD PRESSURE: 161 MMHG

## 2018-09-13 VITALS — SYSTOLIC BLOOD PRESSURE: 152 MMHG | DIASTOLIC BLOOD PRESSURE: 82 MMHG

## 2018-09-13 VITALS — SYSTOLIC BLOOD PRESSURE: 176 MMHG | DIASTOLIC BLOOD PRESSURE: 92 MMHG

## 2018-09-13 RX ADMIN — HYDRALAZINE HYDROCHLORIDE SCH MG: 50 TABLET ORAL at 02:06

## 2018-09-13 RX ADMIN — BENZTROPINE MESYLATE SCH MG: 1 TABLET ORAL at 11:52

## 2018-09-13 RX ADMIN — INSULIN ASPART SCH UNITS: 100 INJECTION, SOLUTION INTRAVENOUS; SUBCUTANEOUS at 06:01

## 2018-09-13 RX ADMIN — ASPIRIN 81 MG SCH MG: 81 TABLET ORAL at 11:53

## 2018-09-13 RX ADMIN — ISOSORBIDE MONONITRATE SCH MG: 30 TABLET, EXTENDED RELEASE ORAL at 11:53

## 2018-09-13 RX ADMIN — METOPROLOL TARTRATE SCH MG: 50 TABLET, FILM COATED ORAL at 11:52

## 2018-09-13 RX ADMIN — INSULIN ASPART SCH UNITS: 100 INJECTION, SOLUTION INTRAVENOUS; SUBCUTANEOUS at 12:00

## 2018-09-13 RX ADMIN — Medication SCH TAB: at 11:52

## 2018-09-13 RX ADMIN — HEPARIN SODIUM SCH UNITS: 5000 INJECTION INTRAVENOUS; SUBCUTANEOUS at 11:56

## 2018-09-13 RX ADMIN — HYDRALAZINE HYDROCHLORIDE SCH MG: 50 TABLET ORAL at 11:54

## 2018-09-13 RX ADMIN — DOXAZOSIN MESYLATE SCH MG: 4 TABLET ORAL at 11:52

## 2018-09-13 RX ADMIN — LISINOPRIL SCH MG: 20 TABLET ORAL at 12:02

## 2018-09-13 NOTE — GENERAL PROGRESS NOTE
Assessment/Plan


Problem List:  


(1) UTI (urinary tract infection)


ICD Codes:  N39.0 - Urinary tract infection, site not specified


SNOMED:  56064966


(2) Malignant hypertension (arteriolar nephrosclerosis)


ICD Codes:  I12.9 - Hypertensive chronic kidney disease with stage 1 through 

stage 4 chronic kidney disease, or unspecified chronic kidney disease


SNOMED:  53409169


(3) Diabetes


ICD Codes:  E11.9 - Type 2 diabetes mellitus without complications


SNOMED:  37168986


(4) ESRD (end stage renal disease)


ICD Codes:  N18.6 - End stage renal disease


SNOMED:  48247228, 333726962, 948210098


(5) Elevated troponin


ICD Codes:  R74.8 - Abnormal levels of other serum enzymes


SNOMED:  580032599, 887763679, 223496042


(6) Anemia in chronic kidney disease


ICD Codes:  N18.9 - Chronic kidney disease, unspecified; D63.1 - Anemia in 

chronic kidney disease


SNOMED:  137113974, 040697986


(7) CKD (chronic kidney disease) stage 5, GFR less than 15 ml/min


ICD Codes:  N18.5 - Chronic kidney disease, stage 5


SNOMED:  333551585


Status:  stable, progressing


Assessment/Plan


cont current bp rx


HD per renal


o2 for comfort


mobilize


pt/ot


dc planning home to apt or with sister





Subjective


Time patient seen:  06:00


ROS Limited/Unobtainable:  No


Constitutional:  Reports: malaise, weakness


HEENT:  Reports: no symptoms


Cardiovascular:  Reports: edema


Respiratory:  Reports: orthopnea, SOB at rest


Gastrointestinal/Abdominal:  Reports: no symptoms


Genitourinary:  Reports: no symptoms


Neurologic/Psychiatric:  Reports: no symptoms


Endocrine:  Reports: no symptoms


Hematologic/Lymphatic:  Reports: anemia


Allergies:  


Coded Allergies:  


     No Known Allergies (Unverified , 9/25/17)


All Systems:  reviewed and negative except above


Subjective


no new complaints. breathing is overall better. has some nasal congestion. on 

humidified o2


denies cp





Objective





Last 24 Hour Vital Signs








  Date Time  Temp Pulse Resp B/P (MAP) Pulse Ox O2 Delivery O2 Flow Rate FiO2


 


9/13/18 12:02    176/92    


 


9/13/18 12:00  82      


 


9/13/18 11:54    176/92    


 


9/13/18 11:53    176/92    


 


9/13/18 11:52  91  176/92    


 


9/13/18 11:52  91  176/92    


 


9/13/18 11:45 97.8 91 20 176/92 (120) 96   





 97.8       


 


9/13/18 09:00      Room Air  


 


9/13/18 08:00  96      


 


9/13/18 08:00 97.9 92 20 155/90 (111) 96   





 97.9       


 


9/13/18 04:00  82      


 


9/13/18 04:00 98.1 67 17 161/82 (108) 96   





 98.1       


 


9/13/18 02:06    152/75    


 


9/13/18 00:00 99.3 58 22 152/82 (105) 100   





 99.3       


 


9/12/18 23:43  71      


 


9/12/18 21:07  65  154/75    


 


9/12/18 21:00      Room Air  


 


9/12/18 20:00  56      


 


9/12/18 20:00 98.2 64 22 154/75 (101) 98   





 98.2       

















Intake and Output  


 


 9/12/18 9/13/18





 19:00 07:00


 


Intake Total 600 ml 240 ml


 


Balance 600 ml 240 ml


 


  


 


Intake Oral 600 ml 240 ml


 


# Voids  2








Height (Feet):  6


Height (Inches):  2.00


Weight (Pounds):  257


Objective


General Appearance:  WD/WN


Neck:  supple


Cardiovascular:  regular rhythm


Respiratory/Chest:  chest wall non-tender, lungs clear, normal breath sounds


Abdomen:  normal bowel sounds, non tender, soft, no organomegaly


Edema:  no edema noted Arm (L), no edema noted Arm (R), no edema noted Leg (L), 

no edema noted Leg (R), no edema noted Pedal (L), no edema noted Pedal (R), no 

edema noted Generalized


Edema:  trace edema


Neurologic:  CNs II-XII grossly normal, no motor/sensory deficits, alert, 

oriented x 3, responsive


Skin:  normal pigmentation


Lymphatic:  normal anterior cervical (L), normal anterior cervical (R), normal 

posterior cervical (L), normal posterior cervical (R), normal submandibular (L)

, normal submandibular (R), normal supraclavicular (L), normal supraclavicular (

R), normal axillary (L), normal axillary (R), normal inguinal (L), normal 

inguinal (R), normal other











[place]oto,Michael M. MD Sep 13, 2018 17:20

## 2018-09-13 NOTE — PROGRESS NOTE
DATE:  09/12/2018



CARDIOLOGY PROGRESS NOTE



SUBJECTIVE:  The patient has less shortness of breath and swelling.  Blood

pressure trend is improving although still elevated.  The patient seems to

have some drop in blood pressure upon standing.



PHYSICAL EXAMINATION:

VITAL SIGNS:  Blood pressure 158/79, pulse 58, respiratory rate 18.

 

NECK:  Supple.  Jugular venous pressure appears normal.  PermCath site

clean and dry.

LUNGS:  Clear.

CARDIAC:  Regular.  Normal S1 and S2 with a fourth heart sound.

ABDOMEN:  Soft.

EXTREMITIES:  With trace edema.



IMPRESSION:

1. End-stage renal disease, now on hemodialysis with a PermCath and AV

fistula to follow.

2. Hypertensive heart disease with improving blood pressure control.

3. Orthostatic hypotension due to fluid removal.

4. Acute myocardial infarction with no reversible ischemia noted on

myocardial perfusion scan.

5. Anemia of chronic kidney disease, much improved.

6. Non-insulin-requiring diabetes mellitus, stable

7. Discharge planning in progress.

8. Medications reviewed and updated.

9. Outpatient followup discussed with the patient and family members.









  ______________________________________________

  Edmar Munguia M.D.





DR:  Carl

D:  09/12/2018 22:02

T:  09/13/2018 00:50

JOB#:  [id number]

CC:

## 2018-09-14 NOTE — DISCHARGE SUMMARY
Discharge Summary


Discharge Summary


_


DATE OF ADMISSION: 08/21/2018


DATE OF DISCHARGE: 09/13/2018


CONSULTANTS:  


Dr. Maurice Nunez


BRIEF HOSPITAL COURSE:


Patient is a 68-year-old male, with history of diabetes, hypertension, 

hypertensive heart disease, diastolic congestive heart failure, history of 

diabetic neuropathy, and nephropathy presented with complaints of worsening 

renal failure and need for possible initiation of dialysis.  He was noted to 

have anemia and elevated troponin level, EKG was in sinus rhythm with 

nonspecific ST to T wave changes.





He was admitted for acute myocardial infarction.  Cardiac troponins were 

monitored.  He was given aspirin.  He was given beta blockers and calcium 

blocker.  He was given blood transfusion.  He was eventually started on epogen 

and iron.  Stool OB was negative 3.


Anti-failure regimen was optimized.  Renal function continued to decline.  

Creatinine clearance 8, 24-hour protein was 7.9 g.  Patient will eventually 

need dialysis.





He had mild fluid overload.  Lasix was started.  He was given Bicitra and 

clonidine.  Troponin were slowly down trending.  Patient will eventually need 

stress test.





He had an episode of bradycardia.  Clonidine was discontinued.





On 09/04/2018, he underwent myocardial perfusion scan.  Myocardial perfusion 

scan normal ejection fraction with possibility of inferior abnormality, but 

more likely artifactual as there was no associated wall motion abnormality.  

There was no plan for cardiac catheterization.  He was given medical therapy.





On 09/06/2018, he underwent placement of a tunneled right jugular hemodialysis 

catheter.  He was eventually started on hemodialyses.  Patient will eventually 

need AV fistula for long-term dialysis.  He was eventually cleared for 

discharge home.








FINAL DIAGNOSES: 


End-stage renal disease, with initiation of hemodialysis through a permacath


Hypertensive heart disease


Acute myocardial infarction with no reversible ischemia noted on myocardial 

perfusion scan


Anemia of chronic kidney disease


Drop in hemoglobin requiring blood transfusion


Non-insulin-requiring diabetes mellitus


Orthostatic hypotension due to fluid removal


Nephrotic syndrome


Neuropathy secondary to diabetes


Malignant hypertension


Anemia of chronic disease


Acute on chronic diastolic congestive heart failure


Lower extremity edema due to nephrotic syndrome


Sinus bradycardia on beta blocker


Moderate protein calorie malnutrition





DISPOSITION: Patient was discharged home with outpatient dialysis arranged.  





DISCHARGE MEDICATIONS: Refer to Discharge Medication List.





DISCHARGE INSTRUCTIONS: Follow up with in a week.








I have been assigned to dictate discharge summary on this account, and I was 

not involved in the patient's management.











Lyric Otto NP Sep 14, 2018 15:10

## 2018-09-14 NOTE — PROGRESS NOTE
DATE:  09/13/2018



SUBJECTIVE:  The patient without chest pain.  Minimal shortness of breath

with activity.  Status post hemodialysis with ultrafiltration.



OBJECTIVE:

VITAL SIGNS:  Blood pressure 161/82, pulse rate 67, respirations 17, and

afebrile.

LUNGS:  Clear.

NECK:  Jugular venous pressure is slightly elevated.

CARDIAC:  Regular rhythm and rate.  Normal S1 and S2 with a fourth heart

sound.

EXTREMITIES:  Trace edema.  Dialysis catheter site is clean and

dry.



IMPRESSION:

1. Acute myocardial infarction with no signs of residual ischemia.

2. Acute on chronic diastolic congestive heart failure, clinically

compensated.

3. End-stage renal disease, now on hemodialysis.

4. Type 2 diabetes mellitus with complications.

5. Anemia of chronic disease and chronic kidney disease, status post

transfusions, now stable.

6. Malignant hypertension, improving.



PLAN:  Discharge home with outpatient followup and plans for permanent

dialysis access to follow.  Discharge medication regimen reviewed with

primary care physician as well as the patient and his family and

hemodialysis and ultrafiltration.  Continue adjustments in his

cardiovascular regimen will be made as needed.









  ______________________________________________

  Edmar Munguia M.D.





DR:  JOON

D:  09/14/2018 23:07

T:  09/14/2018 23:15

JOB#:  [id number]

CC:

## 2024-08-29 NOTE — GENERAL PROGRESS NOTE
Assessment/Plan


Problem List:  


(1) UTI (urinary tract infection)


ICD Codes:  N39.0 - Urinary tract infection, site not specified


SNOMED:  92172963


(2) Malignant hypertension (arteriolar nephrosclerosis)


ICD Codes:  I12.9 - Hypertensive chronic kidney disease with stage 1 through 

stage 4 chronic kidney disease, or unspecified chronic kidney disease


SNOMED:  34750012


(3) Diabetes


ICD Codes:  E11.9 - Type 2 diabetes mellitus without complications


SNOMED:  19353141


(4) ESRD (end stage renal disease)


ICD Codes:  N18.6 - End stage renal disease


SNOMED:  15783073, 250431361, 206746672


(5) Elevated troponin


ICD Codes:  R74.8 - Abnormal levels of other serum enzymes


SNOMED:  541241820, 798379916, 692629697


(6) Anemia in chronic kidney disease


ICD Codes:  N18.9 - Chronic kidney disease, unspecified; D63.1 - Anemia in 

chronic kidney disease


SNOMED:  299188486, 307716304


(7) CKD (chronic kidney disease) stage 5, GFR less than 15 ml/min


ICD Codes:  N18.5 - Chronic kidney disease, stage 5


SNOMED:  221072335


Status:  stable, progressing


Assessment/Plan


cont current rx


epo/iron


stool ob


o2


cards follow up


antiplt rx


transfuse as needed





Subjective


ROS Limited/Unobtainable:  No


Constitutional:  Reports: malaise, weakness


HEENT:  Reports: no symptoms


Cardiovascular:  Reports: no symptoms


Respiratory:  Reports: shortness of breath, SOB with excertion


Gastrointestinal/Abdominal:  Reports: no symptoms


Genitourinary:  Reports: no symptoms


Neurologic/Psychiatric:  Reports: no symptoms


Endocrine:  Reports: no symptoms


Hematologic/Lymphatic:  Reports: no symptoms


Allergies:  


Coded Allergies:  


     No Known Allergies (Unverified , 9/25/17)


All Systems:  reviewed and negative except above


Subjective


less sob. denies chest pain. cards and renal noted. h/h stable. on iron and 

epo. stool ob pending. duplex negative





Objective





Last 24 Hour Vital Signs








  Date Time  Temp Pulse Resp B/P (MAP) Pulse Ox O2 Delivery O2 Flow Rate FiO2


 


8/24/18 07:42 98.8 70 18 153/90 (111) 95   





 98.8       


 


8/24/18 04:00  66      


 


8/24/18 04:00 98.8 65 18 140/67 (91) 95   





 98.8       


 


8/24/18 02:26    142/80    


 


8/24/18 00:00  57      


 


8/24/18 00:00 98.2 59 17 136/63 (87) 93   





 98.2       


 


8/23/18 21:00  66      


 


8/23/18 21:00      Nasal Cannula 2.0 


 


8/23/18 21:00  65  137/73    


 


8/23/18 20:00 97.9 65 18 137/73 (94) 93   





 97.9       


 


8/23/18 17:23    146/90    


 


8/23/18 17:23  62  146/90    


 


8/23/18 16:00  62      


 


8/23/18 15:43 96.8 65 22 146/90 (108) 96   





 96.8       


 


8/23/18 12:00  48      


 


8/23/18 12:00 97.3 67 22 143/81 (101) 95   





 97.3       


 


8/23/18 11:32    151/81    


 


8/23/18 09:00      Nasal Cannula 2.0 


 


8/23/18 08:28  67  153/89    


 


8/23/18 08:28  67  153/89    


 


8/23/18 08:00 97.3 67 20 153/89 (110) 97   





 97.3       


 


8/23/18 08:00  76      

















Intake and Output  


 


 8/23/18 8/24/18





 19:00 07:00


 


Intake Total 500 ml 335 ml


 


Balance 500 ml 335 ml


 


  


 


Intake Oral 500 ml 275 ml


 


IV Total  60 ml


 


# Voids 1 2


 


# Bowel Movements  2








Laboratory Tests


8/24/18 06:45: 


White Blood Count 11.2H, Red Blood Count 2.61L, Hemoglobin 7.9L, Hematocrit 

23.7L, Mean Corpuscular Volume 91, Mean Corpuscular Hemoglobin 30.4, Mean 

Corpuscular Hemoglobin Concent 33.4, Red Cell Distribution Width 16.2H, 

Platelet Count 269, Mean Platelet Volume 6.6, Neutrophils (%) (Auto) , 

Lymphocytes (%) (Auto) , Monocytes (%) (Auto) , Eosinophils (%) (Auto) , 

Basophils (%) (Auto) , Neutrophils % (Manual) [Pending], Lymphocytes % (Manual) 

[Pending], Platelet Estimate [Pending], Platelet Morphology [Pending], Sodium 

Level 141, Potassium Level 4.1, Chloride Level 110H, Carbon Dioxide Level 17L, 

Anion Gap 14, Blood Urea Nitrogen 38H, Creatinine 5.4H, Estimat Glomerular 

Filtration Rate 12.8, Glucose Level 120H, Calcium Level 7.9L, Total Bilirubin 

0.2, Aspartate Amino Transf (AST/SGOT) 12L, Alanine Aminotransferase (ALT/SGPT) 

24, Alkaline Phosphatase 57, Troponin I 0.499H, Total Protein 6.4, Albumin 2.7L

, Globulin 3.7, Albumin/Globulin Ratio 0.7L


Height (Feet):  6


Height (Inches):  2.00


Weight (Pounds):  257


Objective


General Appearance:  WD/WN, alert


Neck:  supple


Cardiovascular:  normal rate


Respiratory/Chest:  chest wall non-tender, lungs clear, normal breath sounds


Abdomen:  normal bowel sounds, non tender, soft, no organomegaly


Neurologic:  CNs II-XII grossly normal, alert, oriented x 3, responsive











Michael Nunez MD Aug 24, 2018 07:59 Spoke with Quinten with [facility], notified provider approval of home care orders. Sid Hu, RN, BSN